# Patient Record
Sex: FEMALE | Race: WHITE | NOT HISPANIC OR LATINO | Employment: OTHER | URBAN - METROPOLITAN AREA
[De-identification: names, ages, dates, MRNs, and addresses within clinical notes are randomized per-mention and may not be internally consistent; named-entity substitution may affect disease eponyms.]

---

## 2017-01-10 ENCOUNTER — OFFICE VISIT (OUTPATIENT)
Dept: FAMILY MEDICINE | Facility: CLINIC | Age: 77
End: 2017-01-10
Payer: MEDICARE

## 2017-01-10 VITALS
HEIGHT: 60 IN | DIASTOLIC BLOOD PRESSURE: 64 MMHG | HEART RATE: 92 BPM | SYSTOLIC BLOOD PRESSURE: 104 MMHG | BODY MASS INDEX: 26.97 KG/M2 | RESPIRATION RATE: 16 BRPM | OXYGEN SATURATION: 96 % | WEIGHT: 137.38 LBS

## 2017-01-10 DIAGNOSIS — E78.5 HYPERLIPIDEMIA, UNSPECIFIED HYPERLIPIDEMIA TYPE: Primary | ICD-10-CM

## 2017-01-10 DIAGNOSIS — R25.2 CRAMPS OF LOWER EXTREMITY, UNSPECIFIED LATERALITY: ICD-10-CM

## 2017-01-10 DIAGNOSIS — Z12.39 BREAST CANCER SCREENING: ICD-10-CM

## 2017-01-10 DIAGNOSIS — Z12.31 ENCOUNTER FOR SCREENING MAMMOGRAM FOR MALIGNANT NEOPLASM OF BREAST: ICD-10-CM

## 2017-01-10 DIAGNOSIS — M81.0 OSTEOPOROSIS: ICD-10-CM

## 2017-01-10 DIAGNOSIS — Z12.11 COLON CANCER SCREENING: ICD-10-CM

## 2017-01-10 PROCEDURE — 99213 OFFICE O/P EST LOW 20 MIN: CPT | Mod: PBBFAC,PO | Performed by: FAMILY MEDICINE

## 2017-01-10 PROCEDURE — 99214 OFFICE O/P EST MOD 30 MIN: CPT | Mod: S$PBB,,, | Performed by: FAMILY MEDICINE

## 2017-01-10 PROCEDURE — 99999 PR PBB SHADOW E&M-EST. PATIENT-LVL III: CPT | Mod: PBBFAC,,, | Performed by: FAMILY MEDICINE

## 2017-01-10 RX ORDER — OMEPRAZOLE 40 MG/1
40 CAPSULE, DELAYED RELEASE ORAL DAILY
Qty: 30 CAPSULE | Refills: 11 | Status: SHIPPED | OUTPATIENT
Start: 2017-01-10 | End: 2017-08-25

## 2017-01-10 RX ORDER — IBANDRONATE SODIUM 150 MG/1
150 TABLET, FILM COATED ORAL
Qty: 1 TABLET | Refills: 11 | Status: SHIPPED | OUTPATIENT
Start: 2017-01-10 | End: 2018-02-05 | Stop reason: SDUPTHER

## 2017-01-10 NOTE — PROGRESS NOTES
Subjective:       Patient ID: Arianne Johnson is a 76 y.o. female.    Chief Complaint: Hyperlipidemia    HPI     C/o bilateral leg cramps x 1 year.  Comes about with prolonged sitting on a recliner.      Reports cramping along upper abdomen with rotation.  Intermittent.      Reports taking fosamax for 1 month in 2015 for osteoporosis. Prefers to take the med once a month.        Review of Systems      Review of Systems   Constitutional: Negative for fever and chills.   HENT: Negative for hearing loss and neck stiffness.    Eyes: Negative for redness and itching.   Respiratory: Negative for cough and choking.    Cardiovascular: Negative for chest pain and leg swelling.  Abdomen: Negative for blood in stool.   Genitourinary: Negative for dysuria and flank pain.   Musculoskeletal: Negative for back pain and gait problem.   Neurological: Negative for light-headedness and headaches.   Hematological: Negative for adenopathy.   Psychiatric/Behavioral: Negative for behavioral problems.     Objective:      Physical Exam   Constitutional: She appears well-developed.   HENT:   Head: Normocephalic and atraumatic.   Eyes: Conjunctivae are normal. Pupils are equal, round, and reactive to light.   Neck: Normal range of motion.   Cardiovascular: Normal rate and regular rhythm.    No murmur heard.  Pulmonary/Chest: Effort normal and breath sounds normal. She has no wheezes.   Abdominal: Soft. Bowel sounds are normal. She exhibits no distension. There is no tenderness.   Musculoskeletal: Normal range of motion.   Lymphadenopathy:     She has no cervical adenopathy.       Assessment:       1. Hyperlipidemia, unspecified hyperlipidemia type    2. Cramps of lower extremity, unspecified laterality    3. Breast cancer screening    4. Colon cancer screening    5. Encounter for screening mammogram for malignant neoplasm of breast     6. Osteoporosis        Plan:       Hyperlipidemia, unspecified hyperlipidemia type  -     Comprehensive metabolic  panel; Future; Expected date: 1/10/17  -     Lipid panel; Future; Expected date: 1/10/17  -     TSH; Future; Expected date: 1/10/17  -     Magnesium; Future; Expected date: 1/10/17    Cramps of lower extremity, unspecified laterality  -     Magnesium; Future; Expected date: 1/10/17    Breast cancer screening  -     Mammo Digital Screening Bilat with CAD; Future; Expected date: 1/10/17    Colon cancer screening  -     Case request GI: COLONOSCOPY    Encounter for screening mammogram for malignant neoplasm of breast   -     Mammo Digital Screening Bilat with CAD; Future; Expected date: 1/10/17    Osteoporosis    Other orders  -     ibandronate (BONIVA) 150 mg tablet; Take 1 tablet (150 mg total) by mouth every 30 days.  Dispense: 1 tablet; Refill: 11  -     omeprazole (PRILOSEC) 40 MG capsule; Take 1 capsule (40 mg total) by mouth once daily.  Dispense: 30 capsule; Refill: 11          Plan:  Start boniva  rf prilosec  See orders  Pt will think about vaccines i.e zoster, prevnar and pneumovax

## 2017-01-10 NOTE — MR AVS SNAPSHOT
[facility]  [address] [facility]  [facility] [address]  Phone: [phone]  Fax: [phone]                  Arianne Johnson   1/10/2017 1:20 PM   Office Visit    Description:  Female : 1940   Provider:  Chiki Paniagua MD   Department:  [facility]           Reason for Visit     Hyperlipidemia           Diagnoses this Visit        Comments    Hyperlipidemia, unspecified hyperlipidemia type    -  Primary     Cramps of lower extremity, unspecified laterality         Breast cancer screening         Colon cancer screening         Encounter for screening mammogram for malignant neoplasm of breast                To Do List           Future Appointments        Provider Department Dept Phone    2017 10:35 AM LAB, [place] [facility] [phone]    2017 3:30 PM [facility] MAMMO1 [facility] [phone]    7/10/2017 1:20 PM Chiki Paniagua MD [facility] [phone]      Goals (5 Years of Data)     None      Follow-Up and Disposition     Return in about 6 months (around 7/10/2017).       These Medications        Disp Refills Start End    ibandronate (BONIVA) 150 mg tablet 1 tablet 11 1/10/2017 2017    Take 1 tablet (150 mg total) by mouth every 30 days. - Oral    Pharmacy: [facility] [id number] [place], [place] [address] AT [facility] [address] Ph #: [phone]       omeprazole (PRILOSEC) 40 MG capsule 30 capsule 11 1/10/2017     Take 1 capsule (40 mg total) by mouth once daily. - Oral    Pharmacy: [facility] [id number] [place] [address] AVE AT [facility] Ph #: [phone]         [facility]     [facility] Nurse Care Line -  Assistance  Registered nurses in the [facility] provide clinical advisement, health education, appointment booking, and other advisory services.  Call for this free service at  [phone].             Medications           Message regarding Medications     Verify the changes and/or additions to your medication regime listed below are the same as discussed with your clinician today.  If any of these changes or additions are incorrect, please notify your healthcare provider.        START taking these NEW medications        Refills    ibandronate (BONIVA) 150 mg tablet 11    Sig: Take 1 tablet (150 mg total) by mouth every 30 days.    Class: Normal    Route: Oral    omeprazole (PRILOSEC) 40 MG capsule 11    Sig: Take 1 capsule (40 mg total) by mouth once daily.    Class: Normal    Route: Oral      STOP taking these medications     alendronate (FOSAMAX) 70 MG tablet Take 1 tablet (70 mg total) by mouth every 7 days.           Verify that the below list of medications is an accurate representation of the medications you are currently taking.  If none reported, the list may be blank. If incorrect, please contact your healthcare provider. Carry this list with you in case of emergency.           Current Medications     calcium-vitamin D 600 mg, 1,500mg,-200 unit (CALCIUM 600 WITH VITAMIN D3) 600 mg(1,500mg) -200 unit Tab Take 1 tablet by mouth once daily.    cetirizine (ZYRTEC) 10 MG tablet Take 10 mg by mouth once daily.    fluticasone (FLONASE) 50 mcg/actuation nasal spray 2 sprays by Each Nare route once daily.    naproxen (EC NAPROSYN) 500 MG EC tablet     ibandronate (BONIVA) 150 mg tablet Take 1 tablet (150 mg total) by mouth every 30 days.    multivitamin capsule Take 1 capsule by mouth once daily.      omeprazole (PRILOSEC) 40 MG capsule Take 1 capsule (40 mg total) by mouth once daily.           Clinical Reference Information           Vital Signs - Last Recorded  Most recent update: 1/10/2017 12:52 PM by Daisy Cruz LPN    BP Pulse Resp Ht Wt SpO2    104/64 92 16 5' (1.524 m) 62.3 kg (137 lb 5.6 oz) 96%    BMI                26.82 kg/m2          Blood Pressure          Most  Recent Value    BP  104/64      Allergies as of 1/10/2017     Codeine    Demerol [Meperidine]    Sulfa (Sulfonamide Antibiotics)      Immunizations Administered on Date of Encounter - 1/10/2017     None      Orders Placed During Today's Visit      Normal Orders This Visit    Case request GI: COLONOSCOPY     Future Labs/Procedures Expected by Expires    Comprehensive metabolic panel  1/10/2017 4/10/2017    Lipid panel  1/10/2017 4/10/2017    Magnesium  1/10/2017 4/10/2017    Mammo Digital Screening Bilat with CAD  1/10/2017 4/10/2017    TSH  1/10/2017 4/10/2017      [facility] Sign-Up     Activating your [facility] account is as easy as 1-2-3!     1) Visit [email or web address], select Sign Up Now, enter this activation code and your date of birth, then select Next.  [email or web address]  Expires: 2/24/2017  2:01 PM      2) Create a username and password to use when you visit [facility] in the future and select a security question in case you lose your password and select Next.    3) Enter your e-mail address and click Sign Up!    Additional Information  If you have questions, please e-mail [email or web address] or call [phone] to talk to our [facility] staff. Remember, [facility] is NOT to be used for urgent needs. For medical emergencies, dial 911.

## 2017-01-18 ENCOUNTER — HOSPITAL ENCOUNTER (OUTPATIENT)
Dept: RADIOLOGY | Facility: HOSPITAL | Age: 77
Discharge: HOME | End: 2017-01-18
Attending: FAMILY MEDICINE
Payer: MEDICARE

## 2017-01-18 DIAGNOSIS — Z12.39 BREAST CANCER SCREENING: ICD-10-CM

## 2017-01-18 DIAGNOSIS — Z12.31 ENCOUNTER FOR SCREENING MAMMOGRAM FOR MALIGNANT NEOPLASM OF BREAST: ICD-10-CM

## 2017-01-18 PROCEDURE — 77067 SCR MAMMO BI INCL CAD: CPT | Mod: 26,,, | Performed by: RADIOLOGY

## 2017-01-18 PROCEDURE — 77063 BREAST TOMOSYNTHESIS BI: CPT | Mod: 26,,, | Performed by: RADIOLOGY

## 2017-01-18 PROCEDURE — 77067 SCR MAMMO BI INCL CAD: CPT | Mod: TC

## 2017-01-24 ENCOUNTER — TELEPHONE (OUTPATIENT)
Dept: FAMILY MEDICINE | Facility: CLINIC | Age: 77
End: 2017-01-24

## 2017-01-24 DIAGNOSIS — E78.5 HYPERLIPIDEMIA, UNSPECIFIED HYPERLIPIDEMIA TYPE: Primary | ICD-10-CM

## 2017-01-24 RX ORDER — ATORVASTATIN CALCIUM 40 MG/1
40 TABLET, FILM COATED ORAL DAILY
Qty: 90 TABLET | Refills: 3 | Status: SHIPPED | OUTPATIENT
Start: 2017-01-24 | End: 2017-11-05 | Stop reason: SDUPTHER

## 2017-01-24 NOTE — TELEPHONE ENCOUNTER
inform pt via phone that I reviewed the test results and note the following:    Pertinent results:    Elevated cholesterol panel.     I esent in lipitor. Please come back in 3 months for repeat cholesterol panel.  Link 3 month cmp and lipids.

## 2017-02-21 ENCOUNTER — ANESTHESIA (OUTPATIENT)
Dept: ENDOSCOPY | Facility: HOSPITAL | Age: 77
End: 2017-02-21
Payer: MEDICARE

## 2017-02-21 ENCOUNTER — ANESTHESIA EVENT (OUTPATIENT)
Dept: ENDOSCOPY | Facility: HOSPITAL | Age: 77
End: 2017-02-21
Payer: MEDICARE

## 2017-02-21 ENCOUNTER — HOSPITAL ENCOUNTER (OUTPATIENT)
Facility: HOSPITAL | Age: 77
Discharge: HOME | End: 2017-02-21
Attending: INTERNAL MEDICINE | Admitting: INTERNAL MEDICINE
Payer: MEDICARE

## 2017-02-21 ENCOUNTER — SURGERY (OUTPATIENT)
Age: 77
End: 2017-02-21

## 2017-02-21 VITALS
HEART RATE: 80 BPM | TEMPERATURE: 98 F | DIASTOLIC BLOOD PRESSURE: 70 MMHG | HEIGHT: 60 IN | SYSTOLIC BLOOD PRESSURE: 126 MMHG | OXYGEN SATURATION: 96 % | RESPIRATION RATE: 16 BRPM | BODY MASS INDEX: 27.48 KG/M2 | WEIGHT: 140 LBS

## 2017-02-21 VITALS — RESPIRATION RATE: 11 BRPM

## 2017-02-21 DIAGNOSIS — Z12.11 SCREENING FOR COLON CANCER: ICD-10-CM

## 2017-02-21 PROCEDURE — 25000003 PHARM REV CODE 250: Mod: PO | Performed by: INTERNAL MEDICINE

## 2017-02-21 PROCEDURE — G0121 COLON CA SCRN NOT HI RSK IND: HCPCS | Mod: PO | Performed by: INTERNAL MEDICINE

## 2017-02-21 PROCEDURE — G0121 COLON CA SCRN NOT HI RSK IND: HCPCS | Mod: ,,, | Performed by: INTERNAL MEDICINE

## 2017-02-21 PROCEDURE — 37000008 HC ANESTHESIA 1ST 15 MINUTES: Mod: PO | Performed by: INTERNAL MEDICINE

## 2017-02-21 PROCEDURE — 25000003 PHARM REV CODE 250: Mod: PO | Performed by: NURSE ANESTHETIST, CERTIFIED REGISTERED

## 2017-02-21 PROCEDURE — 63600175 PHARM REV CODE 636 W HCPCS: Mod: PO | Performed by: NURSE ANESTHETIST, CERTIFIED REGISTERED

## 2017-02-21 PROCEDURE — 37000009 HC ANESTHESIA EA ADD 15 MINS: Mod: PO | Performed by: INTERNAL MEDICINE

## 2017-02-21 PROCEDURE — D9220A PRA ANESTHESIA: Mod: 33,CRNA,, | Performed by: NURSE ANESTHETIST, CERTIFIED REGISTERED

## 2017-02-21 PROCEDURE — D9220A PRA ANESTHESIA: Mod: 33,ANES,, | Performed by: ANESTHESIOLOGY

## 2017-02-21 RX ORDER — PROPOFOL 10 MG/ML
VIAL (ML) INTRAVENOUS
Status: DISCONTINUED | OUTPATIENT
Start: 2017-02-21 | End: 2017-02-21

## 2017-02-21 RX ORDER — SODIUM CHLORIDE, SODIUM LACTATE, POTASSIUM CHLORIDE, CALCIUM CHLORIDE 600; 310; 30; 20 MG/100ML; MG/100ML; MG/100ML; MG/100ML
INJECTION, SOLUTION INTRAVENOUS CONTINUOUS
Status: DISCONTINUED | OUTPATIENT
Start: 2017-02-21 | End: 2017-02-21 | Stop reason: HOSPADM

## 2017-02-21 RX ORDER — LIDOCAINE HCL/PF 100 MG/5ML
SYRINGE (ML) INTRAVENOUS
Status: DISCONTINUED | OUTPATIENT
Start: 2017-02-21 | End: 2017-02-21

## 2017-02-21 RX ADMIN — PROPOFOL 30 MG: 10 INJECTION, EMULSION INTRAVENOUS at 07:02

## 2017-02-21 RX ADMIN — LIDOCAINE HYDROCHLORIDE 100 MG: 20 INJECTION, SOLUTION INTRAVENOUS at 07:02

## 2017-02-21 RX ADMIN — SODIUM CHLORIDE, SODIUM LACTATE, POTASSIUM CHLORIDE, AND CALCIUM CHLORIDE: .6; .31; .03; .02 INJECTION, SOLUTION INTRAVENOUS at 07:02

## 2017-02-21 NOTE — DISCHARGE SUMMARY
Discharge Note  Short Stay      SUMMARY     Admit Date: 2/21/2017    Attending Physician: Juni Devi MD     Discharge Physician: Juni Devi MD    Discharge Date: 2/21/2017 7:40 AM    Final Diagnosis: Colon cancer screening [Z12.11]    Disposition: HOME OR SELF CARE    Patient Instructions:   Current Discharge Medication List      CONTINUE these medications which have NOT CHANGED    Details   atorvastatin (LIPITOR) 40 MG tablet Take 1 tablet (40 mg total) by mouth once daily.  Qty: 90 tablet, Refills: 3      calcium-vitamin D 600 mg, 1,500mg,-200 unit (CALCIUM 600 WITH VITAMIN D3) 600 mg(1,500mg) -200 unit Tab Take 1 tablet by mouth once daily.      cetirizine (ZYRTEC) 10 MG tablet Take 10 mg by mouth once daily.      fluticasone (FLONASE) 50 mcg/actuation nasal spray 2 sprays by Each Nare route once daily.  Qty: 16 g, Refills: 11      ibandronate (BONIVA) 150 mg tablet Take 1 tablet (150 mg total) by mouth every 30 days.  Qty: 1 tablet, Refills: 11      multivitamin capsule Take 1 capsule by mouth once daily.        omeprazole (PRILOSEC) 40 MG capsule Take 1 capsule (40 mg total) by mouth once daily.  Qty: 30 capsule, Refills: 11      naproxen (EC NAPROSYN) 500 MG EC tablet              Discharge Procedure Orders (must include Diet, Follow-up, Activity)    Follow Up:  Follow up with PCP as previously scheduled  Resume routine diet.  Activity as tolerated.    No driving day of procedure.

## 2017-02-21 NOTE — H&P
History & Physical - Short Stay  Gastroenterology      SUBJECTIVE:     Procedure: Colonoscopy    Chief Complaint/Indication for Procedure: Screening    PTA Medications   Medication Sig    atorvastatin (LIPITOR) 40 MG tablet Take 1 tablet (40 mg total) by mouth once daily.    calcium-vitamin D 600 mg, 1,500mg,-200 unit (CALCIUM 600 WITH VITAMIN D3) 600 mg(1,500mg) -200 unit Tab Take 1 tablet by mouth once daily.    cetirizine (ZYRTEC) 10 MG tablet Take 10 mg by mouth once daily.    fluticasone (FLONASE) 50 mcg/actuation nasal spray 2 sprays by Each Nare route once daily.    ibandronate (BONIVA) 150 mg tablet Take 1 tablet (150 mg total) by mouth every 30 days.    multivitamin capsule Take 1 capsule by mouth once daily.      omeprazole (PRILOSEC) 40 MG capsule Take 1 capsule (40 mg total) by mouth once daily.    naproxen (EC NAPROSYN) 500 MG EC tablet        Review of patient's allergies indicates:   Allergen Reactions    Codeine Nausea Only    Demerol [meperidine]      nausea    Sulfa (sulfonamide antibiotics) Nausea Only        Past Medical History   Diagnosis Date    Abnormal Pap smear      repeats were normal    Breast disorder      benign left breast biopsy    GERD (gastroesophageal reflux disease)     Hay fever     Hyperlipidemia     Seizures 02/1960     after giving birth, none since     Past Surgical History   Procedure Laterality Date    Appendectomy      Dilation and curettage of uterus      Total abdominal hysterectomy      Tonsillectomy, adenoidectomy       Family History   Problem Relation Age of Onset    Breast cancer Neg Hx     Ovarian cancer Neg Hx      Social History   Substance Use Topics    Smoking status: Never Smoker    Smokeless tobacco: Never Used    Alcohol use Yes      Comment: occasionally         OBJECTIVE:     Vital Signs (Most Recent)  Temp: 97.9 °F (36.6 °C) (02/21/17 0658)  Pulse: 77 (02/21/17 0658)  Resp: 16 (02/21/17 0658)  BP: (!) 145/67 (02/21/17  0658)  SpO2: 98 % (02/21/17 0658)    Physical Exam                                                        GENERAL:  Comfortable, in no acute distress.                                 HEENT EXAM:  Nonicteric.  No adenopathy.  Oropharynx is clear.               NECK:  Supple.                                                               LUNGS:  Clear.                                                               CARDIAC:  Regular rate and rhythm.  S1, S2.  No murmur.                      ABDOMEN:  Soft, positive bowel sounds, nontender.  No hepatosplenomegaly or masses.  No rebound or guarding.                                             EXTREMITIES:  No edema.     MENTAL STATUS:  Normal, alert and oriented.      ASSESSMENT/PLAN:     Assessment: Colorectal cancer screening    Plan: Colonoscopy    Anesthesia Plan: General    ASA Grade: ASA 2 - Patient with mild systemic disease with no functional limitations    MALLAMPATI SCORE:  I (soft palate, uvula, fauces, and tonsillar pillars visible)

## 2017-02-21 NOTE — ANESTHESIA POSTPROCEDURE EVALUATION
Anesthesia Post Evaluation    Patient: Arianne Johnson    Procedure(s) Performed: Procedure(s) (LRB):  COLONOSCOPY (N/A)    Final Anesthesia Type: general  Patient location during evaluation: PACU  Patient participation: Yes- Able to Participate  Level of consciousness: awake and alert  Post-procedure vital signs: reviewed and stable  Pain management: adequate  Airway patency: patent  PONV status at discharge: No PONV  Anesthetic complications: no      Cardiovascular status: hemodynamically stable and blood pressure returned to baseline  Respiratory status: unassisted, spontaneous ventilation and room air  Hydration status: euvolemic  Follow-up not needed.        Visit Vitals    /70    Pulse 80    Temp 36.8 °C (98.2 °F)    Resp 16    Ht 5' (1.524 m)    Wt 63.5 kg (140 lb)    SpO2 96%    Breastfeeding No    BMI 27.34 kg/m2       Pain/Henny Score: Pain Assessment Performed: Yes (2/21/2017  8:10 AM)  Presence of Pain: denies (2/21/2017  8:10 AM)  Henny Score: 10 (2/21/2017  8:10 AM)

## 2017-02-21 NOTE — ANESTHESIA PREPROCEDURE EVALUATION
02/21/2017  Arianne Johnson is a 76 y.o., female.    [facility] Anesthesia Evaluation      I have reviewed the Medications.     Review of Systems  Anesthesia Hx:  No problems with previous Anesthesia   Social:  Non-Smoker, No Alcohol Use    Cardiovascular:   hyperlipidemia    Pulmonary:  Pulmonary Normal    Renal/:  Renal/ Normal     Hepatic/GI:   GERD    Endocrine:  Endocrine Normal        Physical Exam  General:  Well nourished    Airway/Jaw/Neck:  Airway Findings: Mouth Opening: Normal General Airway Assessment: Adult  Mallampati: II  Jaw/Neck Findings:  Neck ROM: Extension Decreased, Mild       Chest/Lungs:  Chest/Lungs Findings: Clear to auscultation, Normal Respiratory Rate     Heart/Vascular:  Heart Findings: Rate: Normal  Rhythm: Regular Rhythm  Sounds: Normal  Heart murmur: negative Vascular Findings: Normal (No carotid bruits.)       Mental Status:  Mental Status Findings:  Cooperative, Alert and Oriented         Anesthesia Plan  Type of Anesthesia, risks & benefits discussed:  Anesthesia Type:  general  Patient's Preference:   Intra-op Monitoring Plan:   Intra-op Monitoring Plan Comments:   Post Op Pain Control Plan:   Post Op Pain Control Plan Comments:   Induction:   IV  Beta Blocker:  Patient is not currently on a Beta-Blocker (No further documentation required).       Informed Consent: Patient understands risks and agrees with Anesthesia plan.  Questions answered. Anesthesia consent signed with patient.  ASA Score: 2     Day of Surgery Review of History & Physical:        Anesthesia Plan Notes: Propofol general.        Ready For Surgery From Anesthesia Perspective.

## 2017-02-21 NOTE — IP AVS SNAPSHOT
[facility]  [address]  [place] [address]  Phone: [phone]           Patient Discharge Instructions     Our goal is to set you up for success. This packet includes information on your condition, medications, and your home care. It will help you to care for yourself so you don't get sicker and need to go back to the hospital.     Please ask your nurse if you have any questions.        There are many details to remember when preparing to leave the hospital. Here is what you will need to do:    1. Take your medicine. If you are prescribed medications, review your Medication List in the following pages. You may have new medications to  at the pharmacy and others that you'll need to stop taking. Review the instructions for how and when to take your medications. Talk with your doctor or nurses if you are unsure of what to do.     2. Go to your follow-up appointments. Specific follow-up information is listed in the following pages. Your may be contacted by a transition nurse or clinical provider about future appointments. Be sure we have all of the phone numbers to reach you, if needed. Please contact your provider's office if you are unable to make an appointment.     3. Watch for warning signs. Your doctor or nurse will give you detailed warning signs to watch for and when to call for assistance. These instructions may also include educational information about your condition. If you experience any of warning signs to your health, call your doctor.               [facility]  Unless otherwise directed by your provider, please contact [facility], our nurse care line that is available for 24/7 assistance.     [phone] (toll-free)    Registered nurses in the [facility] provide clinical advisement, health education, appointment booking, and other advisory services.                    ** Verify the list of medication(s) below is accurate and up  to date. Carry this with you in case of emergency. If your medications have changed, please notify your healthcare provider.             Medication List      CONTINUE taking these medications        Additional Info                      atorvastatin 40 MG tablet   Commonly known as:  LIPITOR   Quantity:  90 tablet   Refills:  3   Dose:  40 mg    Instructions:  Take 1 tablet (40 mg total) by mouth once daily.     Begin Date    AM    Noon    PM    Bedtime       CALCIUM 600 WITH VITAMIN D3 600 mg(1,500mg) -200 unit Tab   Refills:  0   Dose:  1 tablet   Generic drug:  calcium-vitamin D3    Instructions:  Take 1 tablet by mouth once daily.     Begin Date    AM    Noon    PM    Bedtime       cetirizine 10 MG tablet   Commonly known as:  ZYRTEC   Refills:  0   Dose:  10 mg    Instructions:  Take 10 mg by mouth once daily.     Begin Date    AM    Noon    PM    Bedtime       fluticasone 50 mcg/actuation nasal spray   Commonly known as:  FLONASE   Quantity:  16 g   Refills:  11   Dose:  2 spray    Instructions:  2 sprays by Each Nare route once daily.     Begin Date    AM    Noon    PM    Bedtime       ibandronate 150 mg tablet   Commonly known as:  BONIVA   Quantity:  1 tablet   Refills:  11   Dose:  150 mg    Instructions:  Take 1 tablet (150 mg total) by mouth every 30 days.     Begin Date    AM    Noon    PM    Bedtime       multivitamin capsule   Refills:  0   Dose:  1 capsule    Instructions:  Take 1 capsule by mouth once daily.     Begin Date    AM    Noon    PM    Bedtime       omeprazole 40 MG capsule   Commonly known as:  PRILOSEC   Quantity:  30 capsule   Refills:  11   Dose:  40 mg    Instructions:  Take 1 capsule (40 mg total) by mouth once daily.     Begin Date    AM    Noon    PM    Bedtime         ASK your doctor about these medications        Additional Info                      naproxen 500 MG EC tablet   Commonly known as:  EC NAPROSYN   Refills:  0      Begin Date    AM    Noon    PM    Bedtime                   Please bring to all follow up appointments:    1. A copy of your discharge instructions.  2. All medicines you are currently taking in their original bottles.  3. Identification and insurance card.    Please arrive 15 minutes ahead of scheduled appointment time.    Please call 24 hours in advance if you must reschedule your appointment and/or time.        Your Scheduled Appointments     Jul 10, 2017  1:20 PM CDT   Established Patient Visit with Chiki Paniagua MD   [facility]    [address] [facility] [address] [place] [address]   [phone]              Follow-up Information     Follow up with Chiki Paniagua MD.    Specialty:  Family Medicine    Contact information:    [address] [facility] [address]  [phone]            Discharge Instructions         Procedural Sedation (Adult)  You have been given medicine by vein to make you sleep during your surgery. This may have included both a pain medicine and sleeping medicine. Most of the effects have worn off. But you may still have some drowsiness for the next 6 to 8 hours.  Home care  Follow these guidelines when you get home:  · For the next 8 hours, you should be watched by a responsible adult. This person should make sure your condition is not getting worse.  · Don't take any medicine by mouth for pain or for sleep during the next 4 hours. These might react with the medicines you were given in the hospital. This could cause a much stronger response than usual.  · Don't drink any alcohol for the next 24 hours.  · Don't drive, operate dangerous machinery, or make important business or personal decisions during the next 24 hours.  Follow-up care  Follow up with your healthcare provider if you are not alert and back to your usual level of activity within 12 hours.  When to seek medical advice  Call your healthcare provider right away if any of these occur:  · Drowsiness gets worse  · Weakness or dizziness gets  worse  · Repeated vomiting  · You cannot be awakened   Date Last Reviewed: 10/18/2016  © 8833-3974 [employer]. [address] [place] [address], [place] [address]. All rights reserved. This information is not intended as a substitute for professional medical care. Always follow your healthcare professional's instructions.      See colon sheet      Admission Information     Date & Time Provider Department CSN    2/21/2017  6:20 AM Juni Devi MD [facility] [id number]      Care Providers     Provider Role Specialty Primary office phone    Juni Devi MD Attending Provider Gastroenterology [phone]    Juni Devi MD Surgeon  Gastroenterology [phone]      Your Vitals Were     BP Pulse Temp Resp Height Weight    97/50 (BP Location: Left arm, Patient Position: Lying, BP Method: Automatic) 80 98.2 °F (36.8 °C) 16 5' (1.524 m) 63.5 kg (140 lb)    SpO2 BMI             96% 27.34 kg/m2         Recent Lab Values     No lab values to display.      Allergies as of 2/21/2017        Reactions    Codeine Nausea Only    Demerol [Meperidine]     nausea    Sulfa (Sulfonamide Antibiotics) Nausea Only      Advance Directives     An advance directive is a document which, in the event you are no longer able to make decisions for yourself, tells your healthcare team what kind of treatment you do or do not want to receive, or who you would like to make those decisions for you.  If you do not currently have an advance directive, [facility] encourages you to create one.  For more information call:  [phone]), [phone] ([phone]),  or log on to [email or web address].        Language Assistance Services     ATTENTION: Language assistance services are available, free of charge. Please call [phone].      ATENCIÓN: Si habla español, tiene a garza disposición servicios gratuitos de asistencia lingüística. Llame al [phone].     CHÚ Ý: N?u b?n nói Ti?ng Vi?t,  có các d?ch v? h? tr? ngôn ng? mi?n phí dành cho b?n. G?i s? [phone].         [facility] complies with applicable Federal civil rights laws and does not discriminate on the basis of race, color, national origin, age, disability, or sex.

## 2017-02-21 NOTE — DISCHARGE INSTRUCTIONS
Procedural Sedation (Adult)  You have been given medicine by vein to make you sleep during your surgery. This may have included both a pain medicine and sleeping medicine. Most of the effects have worn off. But you may still have some drowsiness for the next 6 to 8 hours.  Home care  Follow these guidelines when you get home:  · For the next 8 hours, you should be watched by a responsible adult. This person should make sure your condition is not getting worse.  · Don't take any medicine by mouth for pain or for sleep during the next 4 hours. These might react with the medicines you were given in the hospital. This could cause a much stronger response than usual.  · Don't drink any alcohol for the next 24 hours.  · Don't drive, operate dangerous machinery, or make important business or personal decisions during the next 24 hours.  Follow-up care  Follow up with your healthcare provider if you are not alert and back to your usual level of activity within 12 hours.  When to seek medical advice  Call your healthcare provider right away if any of these occur:  · Drowsiness gets worse  · Weakness or dizziness gets worse  · Repeated vomiting  · You cannot be awakened   Date Last Reviewed: 10/18/2016  © 0596-6590 [employer] [address] [place] [address], [place] [address]. All rights reserved. This information is not intended as a substitute for professional medical care. Always follow your healthcare professional's instructions.      See colon sheet

## 2017-07-19 ENCOUNTER — LAB VISIT (OUTPATIENT)
Dept: LAB | Facility: HOSPITAL | Age: 77
End: 2017-07-19
Attending: FAMILY MEDICINE
Payer: MEDICARE

## 2017-07-19 DIAGNOSIS — E78.5 HYPERLIPIDEMIA, UNSPECIFIED HYPERLIPIDEMIA TYPE: ICD-10-CM

## 2017-07-19 LAB
ALBUMIN SERPL BCP-MCNC: 3.7 G/DL
ALP SERPL-CCNC: 108 U/L
ALT SERPL W/O P-5'-P-CCNC: 16 U/L
ANION GAP SERPL CALC-SCNC: 6 MMOL/L
AST SERPL-CCNC: 18 U/L
BILIRUB SERPL-MCNC: 0.4 MG/DL
BUN SERPL-MCNC: 14 MG/DL
CALCIUM SERPL-MCNC: 9.7 MG/DL
CHLORIDE SERPL-SCNC: 106 MMOL/L
CHOLEST/HDLC SERPL: 3.3 {RATIO}
CO2 SERPL-SCNC: 29 MMOL/L
CREAT SERPL-MCNC: 0.9 MG/DL
EST. GFR  (AFRICAN AMERICAN): >60 ML/MIN/1.73 M^2
EST. GFR  (NON AFRICAN AMERICAN): >60 ML/MIN/1.73 M^2
GLUCOSE SERPL-MCNC: 103 MG/DL
HDL/CHOLESTEROL RATIO: 30.5 %
HDLC SERPL-MCNC: 151 MG/DL
HDLC SERPL-MCNC: 46 MG/DL
LDLC SERPL CALC-MCNC: 80.8 MG/DL
NONHDLC SERPL-MCNC: 105 MG/DL
POTASSIUM SERPL-SCNC: 5 MMOL/L
PROT SERPL-MCNC: 7.3 G/DL
SODIUM SERPL-SCNC: 141 MMOL/L
TRIGL SERPL-MCNC: 121 MG/DL

## 2017-07-19 PROCEDURE — 80061 LIPID PANEL: CPT

## 2017-07-19 PROCEDURE — 80053 COMPREHEN METABOLIC PANEL: CPT

## 2017-07-19 PROCEDURE — 36415 COLL VENOUS BLD VENIPUNCTURE: CPT | Mod: PO

## 2017-07-30 ENCOUNTER — PATIENT MESSAGE (OUTPATIENT)
Dept: FAMILY MEDICINE | Facility: CLINIC | Age: 77
End: 2017-07-30

## 2017-08-25 ENCOUNTER — OFFICE VISIT (OUTPATIENT)
Dept: FAMILY MEDICINE | Facility: CLINIC | Age: 77
End: 2017-08-25
Payer: MEDICARE

## 2017-08-25 VITALS
DIASTOLIC BLOOD PRESSURE: 77 MMHG | SYSTOLIC BLOOD PRESSURE: 114 MMHG | WEIGHT: 138.69 LBS | BODY MASS INDEX: 27.23 KG/M2 | HEIGHT: 60 IN | OXYGEN SATURATION: 95 % | HEART RATE: 71 BPM

## 2017-08-25 DIAGNOSIS — R55 SYNCOPE AND COLLAPSE: Primary | ICD-10-CM

## 2017-08-25 DIAGNOSIS — R42 DIZZINESS: ICD-10-CM

## 2017-08-25 DIAGNOSIS — K29.70 GASTRITIS, PRESENCE OF BLEEDING UNSPECIFIED, UNSPECIFIED CHRONICITY, UNSPECIFIED GASTRITIS TYPE: ICD-10-CM

## 2017-08-25 PROCEDURE — 99214 OFFICE O/P EST MOD 30 MIN: CPT | Mod: S$PBB,,, | Performed by: FAMILY MEDICINE

## 2017-08-25 PROCEDURE — 1159F MED LIST DOCD IN RCRD: CPT | Mod: ,,, | Performed by: FAMILY MEDICINE

## 2017-08-25 PROCEDURE — 93005 ELECTROCARDIOGRAM TRACING: CPT | Mod: PBBFAC,PO | Performed by: FAMILY MEDICINE

## 2017-08-25 PROCEDURE — 99213 OFFICE O/P EST LOW 20 MIN: CPT | Mod: PBBFAC,PO | Performed by: FAMILY MEDICINE

## 2017-08-25 PROCEDURE — 93010 ELECTROCARDIOGRAM REPORT: CPT | Mod: ,,, | Performed by: INTERNAL MEDICINE

## 2017-08-25 PROCEDURE — 99999 PR PBB SHADOW E&M-EST. PATIENT-LVL III: CPT | Mod: PBBFAC,,, | Performed by: FAMILY MEDICINE

## 2017-08-25 PROCEDURE — 1126F AMNT PAIN NOTED NONE PRSNT: CPT | Mod: ,,, | Performed by: FAMILY MEDICINE

## 2017-08-25 NOTE — PROGRESS NOTES
Subjective:       Patient ID: Arianne Johnson is a 77 y.o. female.    Chief Complaint: Abdominal Pain (ED F/U [facility] - Dizzy Spells )    HPI     Recall that pt went to [facility]/2017 for epigastric abdominal pain.  Found to have gastritis. Reports less severe abdominal pain    Also, pt reports 3-4 episodes of dizzy spells lasting for 2-4 hours. Had 1 episode which caused to her have presyncopal attack.  No palpitations with dizzy spells. No worsening with movement.       Review of Systems      Review of Systems   Constitutional: Negative for fever and chills.   HENT: Negative for hearing loss and neck stiffness.    Eyes: Negative for redness and itching.   Respiratory: Negative for cough and choking.    Cardiovascular: Negative for chest pain and leg swelling.  Abdomen: Negative for  blood in stool.   Genitourinary: Negative for dysuria and flank pain.   Musculoskeletal: Negative for back pain and gait problem.   Neurological: Negative for  headaches.   Hematological: Negative for adenopathy.   Psychiatric/Behavioral: Negative for behavioral problems.     Objective:      Physical Exam   Constitutional: She is oriented to person, place, and time. She appears well-developed.   HENT:   Head: Normocephalic and atraumatic.   Eyes: Conjunctivae are normal. Pupils are equal, round, and reactive to light.   Neck: Normal range of motion.   Cardiovascular: Normal rate and regular rhythm.    No murmur heard.  Pulmonary/Chest: Effort normal and breath sounds normal. She has no wheezes.   Abdominal: Soft. Bowel sounds are normal. There is no tenderness.   Lymphadenopathy:     She has no cervical adenopathy.   Neurological: She is alert and oriented to person, place, and time. She has normal reflexes. No cranial nerve deficit.       Assessment:       1. Syncope and collapse     2. Dizziness    3. Gastritis, presence of bleeding unspecified, unspecified chronicity, unspecified gastritis type        Plan:       Syncope and  collapse   -     US Carotid Bilateral; Future; Expected date: 08/25/2017  -     NM Myocardial Perfusion Spect Multi Pharmacologic; Future; Expected date: 08/25/2017  -     Holter monitor - 48 hour; Future  -     IN OFFICE EKG 12-LEAD (to Muse)    Dizziness  -     US Carotid Bilateral; Future; Expected date: 08/25/2017  -     NM Myocardial Perfusion Spect Multi Pharmacologic; Future; Expected date: 08/25/2017  -     NM Multi Pharm Stress Cardiac Component; Future  -     Holter monitor - 48 hour; Future  -     IN OFFICE EKG 12-LEAD (to Muse)    Gastritis, presence of bleeding unspecified, unspecified chronicity, unspecified gastritis type      Plan:  See orders  Gastritis resolving

## 2017-08-28 ENCOUNTER — CLINICAL SUPPORT (OUTPATIENT)
Dept: CARDIOLOGY | Facility: CLINIC | Age: 77
End: 2017-08-28
Payer: MEDICARE

## 2017-08-28 DIAGNOSIS — R42 DIZZINESS: ICD-10-CM

## 2017-08-28 DIAGNOSIS — R55 SYNCOPE AND COLLAPSE: ICD-10-CM

## 2017-08-28 PROCEDURE — 93226 XTRNL ECG REC<48 HR SCAN A/R: CPT | Mod: PBBFAC,PO | Performed by: INTERNAL MEDICINE

## 2017-08-28 PROCEDURE — 93227 XTRNL ECG REC<48 HR R&I: CPT | Mod: S$PBB,,, | Performed by: INTERNAL MEDICINE

## 2017-08-30 ENCOUNTER — HOSPITAL ENCOUNTER (OUTPATIENT)
Dept: RADIOLOGY | Facility: HOSPITAL | Age: 77
Discharge: HOME | End: 2017-08-30
Attending: FAMILY MEDICINE
Payer: MEDICARE

## 2017-08-30 DIAGNOSIS — R55 SYNCOPE AND COLLAPSE: ICD-10-CM

## 2017-08-30 DIAGNOSIS — R42 DIZZINESS: ICD-10-CM

## 2017-08-30 PROCEDURE — 93880 EXTRACRANIAL BILAT STUDY: CPT | Mod: 26,,, | Performed by: RADIOLOGY

## 2017-08-30 PROCEDURE — 93880 EXTRACRANIAL BILAT STUDY: CPT | Mod: TC,PO

## 2017-09-04 ENCOUNTER — TELEPHONE (OUTPATIENT)
Dept: FAMILY MEDICINE | Facility: CLINIC | Age: 77
End: 2017-09-04

## 2017-09-04 NOTE — TELEPHONE ENCOUNTER
inform pt via phone that I reviewed the test results and note the following:    Carotid us and holter monitor were acceptable.

## 2017-09-05 ENCOUNTER — PATIENT MESSAGE (OUTPATIENT)
Dept: FAMILY MEDICINE | Facility: CLINIC | Age: 77
End: 2017-09-05

## 2017-09-05 RX ORDER — MECLIZINE HYDROCHLORIDE 25 MG/1
25 TABLET ORAL 3 TIMES DAILY PRN
Qty: 30 TABLET | Refills: 2 | Status: SHIPPED | OUTPATIENT
Start: 2017-09-05 | End: 2018-05-07 | Stop reason: SDUPTHER

## 2017-10-06 ENCOUNTER — OFFICE VISIT (OUTPATIENT)
Dept: FAMILY MEDICINE | Facility: CLINIC | Age: 77
End: 2017-10-06
Payer: MEDICARE

## 2017-10-06 VITALS
OXYGEN SATURATION: 97 % | TEMPERATURE: 98 F | WEIGHT: 142.19 LBS | RESPIRATION RATE: 18 BRPM | HEART RATE: 73 BPM | SYSTOLIC BLOOD PRESSURE: 120 MMHG | DIASTOLIC BLOOD PRESSURE: 60 MMHG | HEIGHT: 60 IN | BODY MASS INDEX: 27.92 KG/M2

## 2017-10-06 DIAGNOSIS — E78.5 HYPERLIPIDEMIA, UNSPECIFIED HYPERLIPIDEMIA TYPE: ICD-10-CM

## 2017-10-06 DIAGNOSIS — M81.0 OSTEOPOROSIS, UNSPECIFIED OSTEOPOROSIS TYPE, UNSPECIFIED PATHOLOGICAL FRACTURE PRESENCE: ICD-10-CM

## 2017-10-06 DIAGNOSIS — R42 VERTIGO: Primary | ICD-10-CM

## 2017-10-06 DIAGNOSIS — J31.0 CHRONIC RHINITIS, UNSPECIFIED TYPE: ICD-10-CM

## 2017-10-06 PROCEDURE — 99213 OFFICE O/P EST LOW 20 MIN: CPT | Mod: PBBFAC,PO | Performed by: FAMILY MEDICINE

## 2017-10-06 PROCEDURE — 99214 OFFICE O/P EST MOD 30 MIN: CPT | Mod: S$PBB,,, | Performed by: FAMILY MEDICINE

## 2017-10-06 PROCEDURE — 99999 PR PBB SHADOW E&M-EST. PATIENT-LVL III: CPT | Mod: PBBFAC,,, | Performed by: FAMILY MEDICINE

## 2017-10-06 RX ORDER — FLUTICASONE PROPIONATE 50 MCG
2 SPRAY, SUSPENSION (ML) NASAL DAILY
Qty: 16 G | Refills: 11 | Status: SHIPPED | OUTPATIENT
Start: 2017-10-06 | End: 2018-05-07

## 2017-11-06 RX ORDER — ATORVASTATIN CALCIUM 40 MG/1
TABLET, FILM COATED ORAL
Qty: 90 TABLET | Refills: 3 | Status: SHIPPED | OUTPATIENT
Start: 2017-11-06 | End: 2019-01-01 | Stop reason: SDUPTHER

## 2018-01-01 ENCOUNTER — PATIENT MESSAGE (OUTPATIENT)
Dept: NEUROLOGY | Facility: CLINIC | Age: 78
End: 2018-01-01

## 2018-01-01 ENCOUNTER — OFFICE VISIT (OUTPATIENT)
Dept: OTOLARYNGOLOGY | Facility: CLINIC | Age: 78
End: 2018-01-01
Payer: MEDICARE

## 2018-01-01 ENCOUNTER — PATIENT OUTREACH (OUTPATIENT)
Dept: ADMINISTRATIVE | Facility: HOSPITAL | Age: 78
End: 2018-01-01

## 2018-01-01 ENCOUNTER — OFFICE VISIT (OUTPATIENT)
Dept: FAMILY MEDICINE | Facility: CLINIC | Age: 78
End: 2018-01-01
Payer: MEDICARE

## 2018-01-01 ENCOUNTER — TELEPHONE (OUTPATIENT)
Dept: NEUROLOGY | Facility: CLINIC | Age: 78
End: 2018-01-01

## 2018-01-01 ENCOUNTER — RESEARCH ENCOUNTER (OUTPATIENT)
Dept: RESEARCH | Facility: HOSPITAL | Age: 78
End: 2018-01-01

## 2018-01-01 ENCOUNTER — LAB VISIT (OUTPATIENT)
Dept: LAB | Facility: HOSPITAL | Age: 78
End: 2018-01-01
Attending: PSYCHIATRY & NEUROLOGY
Payer: MEDICARE

## 2018-01-01 ENCOUNTER — HOSPITAL ENCOUNTER (OUTPATIENT)
Dept: RADIOLOGY | Facility: HOSPITAL | Age: 78
Discharge: HOME | End: 2018-11-26
Attending: FAMILY MEDICINE
Payer: MEDICARE

## 2018-01-01 ENCOUNTER — IMMUNIZATION (OUTPATIENT)
Dept: INTERNAL MEDICINE | Facility: CLINIC | Age: 78
End: 2018-01-01
Payer: MEDICARE

## 2018-01-01 ENCOUNTER — OFFICE VISIT (OUTPATIENT)
Dept: NEUROLOGY | Facility: CLINIC | Age: 78
End: 2018-01-01
Payer: MEDICARE

## 2018-01-01 ENCOUNTER — SOCIAL WORK (OUTPATIENT)
Dept: NEUROLOGY | Facility: CLINIC | Age: 78
End: 2018-01-01

## 2018-01-01 VITALS
OXYGEN SATURATION: 96 % | WEIGHT: 123 LBS | HEART RATE: 84 BPM | BODY MASS INDEX: 24.15 KG/M2 | HEIGHT: 60 IN | DIASTOLIC BLOOD PRESSURE: 78 MMHG | SYSTOLIC BLOOD PRESSURE: 114 MMHG

## 2018-01-01 VITALS — BODY MASS INDEX: 23.72 KG/M2 | HEIGHT: 60 IN | WEIGHT: 120.81 LBS | OXYGEN SATURATION: 94 %

## 2018-01-01 VITALS
BODY MASS INDEX: 24.32 KG/M2 | SYSTOLIC BLOOD PRESSURE: 130 MMHG | WEIGHT: 123.88 LBS | HEIGHT: 60 IN | DIASTOLIC BLOOD PRESSURE: 62 MMHG | RESPIRATION RATE: 20 BRPM | HEART RATE: 93 BPM

## 2018-01-01 VITALS — WEIGHT: 126.31 LBS | HEIGHT: 60 IN | BODY MASS INDEX: 24.8 KG/M2

## 2018-01-01 DIAGNOSIS — R49.0 HOARSE VOICE QUALITY: ICD-10-CM

## 2018-01-01 DIAGNOSIS — R29.2 HYPERREFLEXIA: ICD-10-CM

## 2018-01-01 DIAGNOSIS — M21.371 RIGHT FOOT DROP: ICD-10-CM

## 2018-01-01 DIAGNOSIS — G12.21 ALS (AMYOTROPHIC LATERAL SCLEROSIS): Primary | ICD-10-CM

## 2018-01-01 DIAGNOSIS — R53.83 OTHER FATIGUE: ICD-10-CM

## 2018-01-01 DIAGNOSIS — J32.9 RHINOSINUSITIS: ICD-10-CM

## 2018-01-01 DIAGNOSIS — K21.9 GASTROESOPHAGEAL REFLUX DISEASE, ESOPHAGITIS PRESENCE NOT SPECIFIED: ICD-10-CM

## 2018-01-01 DIAGNOSIS — R13.10 DYSPHAGIA, UNSPECIFIED TYPE: ICD-10-CM

## 2018-01-01 DIAGNOSIS — G12.21 ALS (AMYOTROPHIC LATERAL SCLEROSIS): ICD-10-CM

## 2018-01-01 DIAGNOSIS — R13.12 OROPHARYNGEAL DYSPHAGIA: ICD-10-CM

## 2018-01-01 DIAGNOSIS — K21.9 LARYNGOPHARYNGEAL REFLUX (LPR): Primary | ICD-10-CM

## 2018-01-01 DIAGNOSIS — Z78.0 MENOPAUSE: ICD-10-CM

## 2018-01-01 DIAGNOSIS — R05.9 COUGH: ICD-10-CM

## 2018-01-01 DIAGNOSIS — Z00.6 RESEARCH STUDY PATIENT: ICD-10-CM

## 2018-01-01 DIAGNOSIS — Z78.9 IMPAIRED MOBILITY AND ADLS: ICD-10-CM

## 2018-01-01 DIAGNOSIS — Z00.8 NUTRITIONAL ASSESSMENT: ICD-10-CM

## 2018-01-01 DIAGNOSIS — J96.10 CHRONIC NEUROMUSCULAR RESPIRATORY FAILURE: ICD-10-CM

## 2018-01-01 DIAGNOSIS — Z74.09 IMPAIRED MOBILITY AND ADLS: ICD-10-CM

## 2018-01-01 DIAGNOSIS — R47.1 DYSARTHRIA: ICD-10-CM

## 2018-01-01 LAB
ACE SERPL-CCNC: 28 U/L
ACHR BIND AB SER-SCNC: 0 NMOL/L
ALBUMIN SERPL BCP-MCNC: 3.6 G/DL
ALBUMIN SERPL BCP-MCNC: 3.8 G/DL
ALP SERPL-CCNC: 101 U/L
ALP SERPL-CCNC: 85 U/L
ALT SERPL W/O P-5'-P-CCNC: 20 U/L
ALT SERPL W/O P-5'-P-CCNC: 22 U/L
AMPHIPHYSIN AB TITR SER: NEGATIVE TITER
ANION GAP SERPL CALC-SCNC: 10 MMOL/L
AST SERPL-CCNC: 21 U/L
AST SERPL-CCNC: 23 U/L
BASOPHILS # BLD AUTO: 0.02 K/UL
BASOPHILS # BLD AUTO: 0.03 K/UL
BASOPHILS NFR BLD: 0.3 %
BASOPHILS NFR BLD: 0.4 %
BILIRUB DIRECT SERPL-MCNC: 0.2 MG/DL
BILIRUB SERPL-MCNC: 0.5 MG/DL
BILIRUB SERPL-MCNC: 0.5 MG/DL
BUN SERPL-MCNC: 17 MG/DL
CALCIUM SERPL-MCNC: 9.6 MG/DL
CHLORIDE SERPL-SCNC: 102 MMOL/L
CO2 SERPL-SCNC: 26 MMOL/L
CREAT SERPL-MCNC: 0.7 MG/DL
CV2 IGG TITR SER: NEGATIVE TITER
DEPRECATED GD1B DISIALYL IGG SER QL: NORMAL
DEPRECATED GD1B DISIALYL IGM SER QL: NORMAL
DIFFERENTIAL METHOD: ABNORMAL
DIFFERENTIAL METHOD: NORMAL
EOSINOPHIL # BLD AUTO: 0.1 K/UL
EOSINOPHIL # BLD AUTO: 0.1 K/UL
EOSINOPHIL NFR BLD: 1.2 %
EOSINOPHIL NFR BLD: 1.3 %
ERYTHROCYTE [DISTWIDTH] IN BLOOD BY AUTOMATED COUNT: 11.9 %
ERYTHROCYTE [DISTWIDTH] IN BLOOD BY AUTOMATED COUNT: 12.1 %
EST. GFR  (AFRICAN AMERICAN): >60 ML/MIN/1.73 M^2
EST. GFR  (NON AFRICAN AMERICAN): >60 ML/MIN/1.73 M^2
GANGLIIOSIDE AB COMMENT: NORMAL
GLIAL NUC TYPE 1 AB TITR SER: NEGATIVE TITER
GLUCOSE SERPL-MCNC: 85 MG/DL
GM1 ASIALO IGG SER QL: NORMAL
GM1 ASIALO IGM SER QL: NORMAL
GM1 GANGL IGG SER QL: NORMAL
GM1 GANGL IGM SER QL: NORMAL
HCT VFR BLD AUTO: 38.6 %
HCT VFR BLD AUTO: 40 %
HGB BLD-MCNC: 12.5 G/DL
HGB BLD-MCNC: 12.7 G/DL
HU1 AB TITR SER: NEGATIVE TITER
HU2 AB TITR SER IF: NEGATIVE TITER
HU3 AB TITR SER: NEGATIVE TITER
IMM GRANULOCYTES # BLD AUTO: 0.01 K/UL
IMM GRANULOCYTES NFR BLD AUTO: 0.1 %
IMMUNOLOGIST REVIEW: NORMAL
LYMPHOCYTES # BLD AUTO: 2.3 K/UL
LYMPHOCYTES # BLD AUTO: 2.5 K/UL
LYMPHOCYTES NFR BLD: 26.8 %
LYMPHOCYTES NFR BLD: 31.3 %
MCH RBC QN AUTO: 30.1 PG
MCH RBC QN AUTO: 30.5 PG
MCHC RBC AUTO-ENTMCNC: 31.8 G/DL
MCHC RBC AUTO-ENTMCNC: 32.4 G/DL
MCV RBC AUTO: 94 FL
MCV RBC AUTO: 95 FL
MONOCYTES # BLD AUTO: 0.7 K/UL
MONOCYTES # BLD AUTO: 0.8 K/UL
MONOCYTES NFR BLD: 10.2 %
MONOCYTES NFR BLD: 8.7 %
NACHR AB SER-SCNC: 0 NMOL/L
NEUTROPHILS # BLD AUTO: 4.5 K/UL
NEUTROPHILS # BLD AUTO: 5.3 K/UL
NEUTROPHILS NFR BLD: 56.9 %
NEUTROPHILS NFR BLD: 62.8 %
NRBC BLD-RTO: 0 /100 WBC
PAVAL REFLEX TEST ADDED: NORMAL
PCA-1 AB TITR SER: NEGATIVE TITER
PCA-2 AB TITR SER: NEGATIVE TITER
PCA-TR AB TITR SER: NEGATIVE TITER
PLATELET # BLD AUTO: 223 K/UL
PLATELET # BLD AUTO: 239 K/UL
PMV BLD AUTO: 10.4 FL
PMV BLD AUTO: 10.6 FL
POTASSIUM SERPL-SCNC: 4.2 MMOL/L
PROT SERPL-MCNC: 6.9 G/DL
PROT SERPL-MCNC: 7.3 G/DL
RBC # BLD AUTO: 4.1 M/UL
RBC # BLD AUTO: 4.22 M/UL
RESEARCH LAB: NORMAL
SODIUM SERPL-SCNC: 138 MMOL/L
STRIA MUS AB TITR SER: NEGATIVE TITER
VGCC-N BIND AB SER-SCNC: 0.02 NMOL/L
VGCC-P/Q BIND AB SER-SCNC: 0 NMOL/L
VGKC AB SER-SCNC: 0 NMOL/L
WBC # BLD AUTO: 7.87 K/UL
WBC # BLD AUTO: 8.47 K/UL

## 2018-01-01 PROCEDURE — 85025 COMPLETE CBC W/AUTO DIFF WBC: CPT

## 2018-01-01 PROCEDURE — 99213 OFFICE O/P EST LOW 20 MIN: CPT | Mod: PBBFAC,PO | Performed by: FAMILY MEDICINE

## 2018-01-01 PROCEDURE — 80076 HEPATIC FUNCTION PANEL: CPT

## 2018-01-01 PROCEDURE — 77080 DXA BONE DENSITY AXIAL: CPT | Mod: 26,,, | Performed by: RADIOLOGY

## 2018-01-01 PROCEDURE — 80053 COMPREHEN METABOLIC PANEL: CPT

## 2018-01-01 PROCEDURE — 90662 IIV NO PRSV INCREASED AG IM: CPT | Mod: PBBFAC

## 2018-01-01 PROCEDURE — 96116 NUBHVL XM PHYS/QHP 1ST HR: CPT | Mod: PBBFAC | Performed by: PSYCHIATRY & NEUROLOGY

## 2018-01-01 PROCEDURE — G8988 SELF CARE GOAL STATUS: HCPCS | Mod: CJ,PO

## 2018-01-01 PROCEDURE — G8999 MOTOR SPEECH CURRENT STATUS: HCPCS | Mod: CI

## 2018-01-01 PROCEDURE — 99213 OFFICE O/P EST LOW 20 MIN: CPT | Mod: PBBFAC,25

## 2018-01-01 PROCEDURE — 92610 EVALUATE SWALLOWING FUNCTION: CPT | Mod: PO

## 2018-01-01 PROCEDURE — 99204 OFFICE O/P NEW MOD 45 MIN: CPT | Mod: S$PBB,,, | Performed by: INTERNAL MEDICINE

## 2018-01-01 PROCEDURE — G9186 MOTOR SPEECH GOAL STATUS: HCPCS | Mod: CI

## 2018-01-01 PROCEDURE — 99214 OFFICE O/P EST MOD 30 MIN: CPT | Mod: PBBFAC,PN | Performed by: PSYCHIATRY & NEUROLOGY

## 2018-01-01 PROCEDURE — G8980 MOBILITY D/C STATUS: HCPCS | Mod: CK,PO

## 2018-01-01 PROCEDURE — 77080 DXA BONE DENSITY AXIAL: CPT | Mod: TC,PO

## 2018-01-01 PROCEDURE — 99999 PR PBB SHADOW E&M-EST. PATIENT-LVL III: CPT | Mod: PBBFAC,,,

## 2018-01-01 PROCEDURE — 99215 OFFICE O/P EST HI 40 MIN: CPT | Mod: S$PBB,,, | Performed by: PSYCHIATRY & NEUROLOGY

## 2018-01-01 PROCEDURE — 99999 PR PBB SHADOW E&M-EST. PATIENT-LVL IV: CPT | Mod: PBBFAC,,, | Performed by: PSYCHIATRY & NEUROLOGY

## 2018-01-01 PROCEDURE — 36415 COLL VENOUS BLD VENIPUNCTURE: CPT | Mod: PO

## 2018-01-01 PROCEDURE — 99213 OFFICE O/P EST LOW 20 MIN: CPT | Mod: S$PBB,,, | Performed by: OTOLARYNGOLOGY

## 2018-01-01 PROCEDURE — G8987 SELF CARE CURRENT STATUS: HCPCS | Mod: CK,PO

## 2018-01-01 PROCEDURE — G9158 MOTOR SPEECH D/C STATUS: HCPCS | Mod: CI

## 2018-01-01 PROCEDURE — 99212 OFFICE O/P EST SF 10 MIN: CPT | Mod: PBBFAC,PO | Performed by: OTOLARYNGOLOGY

## 2018-01-01 PROCEDURE — G8998 SWALLOW D/C STATUS: HCPCS | Mod: CI

## 2018-01-01 PROCEDURE — 92522 EVALUATE SPEECH PRODUCTION: CPT | Mod: PO

## 2018-01-01 PROCEDURE — 99999 PR PBB SHADOW E&M-EST. PATIENT-LVL III: CPT | Mod: PBBFAC,,, | Performed by: FAMILY MEDICINE

## 2018-01-01 PROCEDURE — 99999 PR PBB SHADOW E&M-EST. PATIENT-LVL II: CPT | Mod: PBBFAC,,, | Performed by: OTOLARYNGOLOGY

## 2018-01-01 PROCEDURE — G8978 MOBILITY CURRENT STATUS: HCPCS | Mod: CK,PO

## 2018-01-01 PROCEDURE — G8996 SWALLOW CURRENT STATUS: HCPCS | Mod: CI

## 2018-01-01 PROCEDURE — 36415 COLL VENOUS BLD VENIPUNCTURE: CPT

## 2018-01-01 PROCEDURE — 97165 OT EVAL LOW COMPLEX 30 MIN: CPT | Mod: PO

## 2018-01-01 PROCEDURE — 96116 NUBHVL XM PHYS/QHP 1ST HR: CPT | Mod: S$PBB,,, | Performed by: PSYCHIATRY & NEUROLOGY

## 2018-01-01 PROCEDURE — 97162 PT EVAL MOD COMPLEX 30 MIN: CPT | Mod: PO

## 2018-01-01 PROCEDURE — 99214 OFFICE O/P EST MOD 30 MIN: CPT | Mod: S$PBB,,, | Performed by: FAMILY MEDICINE

## 2018-01-01 PROCEDURE — G8989 SELF CARE D/C STATUS: HCPCS | Mod: CJ,PO

## 2018-01-01 PROCEDURE — 99214 OFFICE O/P EST MOD 30 MIN: CPT | Mod: S$PBB,,, | Performed by: PSYCHIATRY & NEUROLOGY

## 2018-01-01 PROCEDURE — G8997 SWALLOW GOAL STATUS: HCPCS | Mod: CI

## 2018-01-01 PROCEDURE — G8979 MOBILITY GOAL STATUS: HCPCS | Mod: CK,PO

## 2018-01-01 RX ORDER — CETIRIZINE HYDROCHLORIDE 10 MG/1
10 TABLET ORAL DAILY
Refills: 11 | COMMUNITY
Start: 2018-01-01 | End: 2019-11-30

## 2018-01-01 RX ORDER — CETIRIZINE HYDROCHLORIDE 10 MG/1
10 TABLET ORAL DAILY
Refills: 11 | COMMUNITY
Start: 2018-01-01 | End: 2018-01-01 | Stop reason: SDUPTHER

## 2018-01-01 RX ORDER — RILUZOLE 50 MG/1
50 TABLET, FILM COATED ORAL EVERY 12 HOURS
Qty: 60 TABLET | Refills: 11 | Status: SHIPPED | OUTPATIENT
Start: 2018-01-01 | End: 2019-10-25

## 2018-01-26 ENCOUNTER — HOSPITAL ENCOUNTER (OUTPATIENT)
Dept: RADIOLOGY | Facility: HOSPITAL | Age: 78
Discharge: HOME | End: 2018-01-26
Attending: FAMILY MEDICINE
Payer: MEDICARE

## 2018-01-26 ENCOUNTER — OFFICE VISIT (OUTPATIENT)
Dept: FAMILY MEDICINE | Facility: CLINIC | Age: 78
End: 2018-01-26
Payer: MEDICARE

## 2018-01-26 VITALS
HEIGHT: 60 IN | HEART RATE: 89 BPM | SYSTOLIC BLOOD PRESSURE: 128 MMHG | DIASTOLIC BLOOD PRESSURE: 64 MMHG | WEIGHT: 136.88 LBS | BODY MASS INDEX: 26.87 KG/M2 | OXYGEN SATURATION: 97 % | TEMPERATURE: 98 F

## 2018-01-26 DIAGNOSIS — R06.2 WHEEZE: ICD-10-CM

## 2018-01-26 DIAGNOSIS — R05.8 PRODUCTIVE COUGH: ICD-10-CM

## 2018-01-26 DIAGNOSIS — M54.9 MID BACK PAIN: ICD-10-CM

## 2018-01-26 DIAGNOSIS — M54.9 MID BACK PAIN: Primary | ICD-10-CM

## 2018-01-26 PROCEDURE — 99214 OFFICE O/P EST MOD 30 MIN: CPT | Mod: S$PBB,,, | Performed by: FAMILY MEDICINE

## 2018-01-26 PROCEDURE — 99999 PR PBB SHADOW E&M-EST. PATIENT-LVL III: CPT | Mod: PBBFAC,,, | Performed by: FAMILY MEDICINE

## 2018-01-26 PROCEDURE — 99213 OFFICE O/P EST LOW 20 MIN: CPT | Mod: PBBFAC,PO,25 | Performed by: FAMILY MEDICINE

## 2018-01-26 PROCEDURE — 72070 X-RAY EXAM THORAC SPINE 2VWS: CPT | Mod: 26,,, | Performed by: RADIOLOGY

## 2018-01-26 PROCEDURE — 71046 X-RAY EXAM CHEST 2 VIEWS: CPT | Mod: TC,FY,PO

## 2018-01-26 PROCEDURE — 71046 X-RAY EXAM CHEST 2 VIEWS: CPT | Mod: 26,,, | Performed by: RADIOLOGY

## 2018-01-26 PROCEDURE — 72070 X-RAY EXAM THORAC SPINE 2VWS: CPT | Mod: TC,FY,PO

## 2018-01-26 RX ORDER — OMEPRAZOLE 40 MG/1
CAPSULE, DELAYED RELEASE ORAL
COMMUNITY
Start: 2017-11-05 | End: 2018-05-07 | Stop reason: SDUPTHER

## 2018-01-26 RX ORDER — DOXYCYCLINE 100 MG/1
100 CAPSULE ORAL 2 TIMES DAILY
Qty: 20 CAPSULE | Refills: 0 | Status: SHIPPED | OUTPATIENT
Start: 2018-01-26 | End: 2018-05-07

## 2018-01-26 RX ORDER — PREDNISONE 10 MG/1
TABLET ORAL
Qty: 20 TABLET | Refills: 0 | Status: SHIPPED | OUTPATIENT
Start: 2018-01-26 | End: 2018-05-07

## 2018-01-26 NOTE — PROGRESS NOTES
Subjective:       Patient ID: Arianne Johnson is a 77 y.o. female.    Chief Complaint: Back Pain and Shortness of Breath (symptoms since Denise)    HPI     Reports falling on back in September 2017.  Reports no pain but started having mid back back pain approx 1 month ago.  Mild to mod relief from taking otc tylenol.  Less pain 1-2 weeks ago.      Started off with sore throat, rhinorrhea and cough I.e uri, approx 1 month ago. Since then, c/o intermittently coughing up clear phelgm with wheeze and sob on exertion.        Review of Systems      Review of Systems   Constitutional: Negative for fever and chills.   HENT: Negative for hearing loss and neck stiffness.    Eyes: Negative for redness and itching.   Respiratory: Negative for choking.    Cardiovascular: Negative for chest pain and leg swelling.  Abdomen: Negative for abdominal pain and blood in stool.   Genitourinary: Negative for dysuria and flank pain.   Musculoskeletal: Negative for gait problem.   Neurological: Negative for light-headedness and headaches.   Hematological: Negative for adenopathy.   Psychiatric/Behavioral: Negative for behavioral problems.       Objective:      Physical Exam   Constitutional: She appears well-developed.   HENT:   Head: Normocephalic and atraumatic.   Eyes: Conjunctivae are normal. Pupils are equal, round, and reactive to light.   Neck: Normal range of motion.   Cardiovascular: Normal rate and regular rhythm.    No murmur heard.  Pulmonary/Chest: Effort normal and breath sounds normal. She has no wheezes.   Musculoskeletal:   Thoracic spine: tend of T6-10 spinous process and bilat paravert areas. Pain with overhead movements.    Lymphadenopathy:     She has no cervical adenopathy.       Assessment:       1. Mid back pain    2. Productive cough    3. Wheeze        Plan:       Mid back pain  -     X-Ray Thoracic Spine AP Lateral; Future; Expected date: 01/26/2018  -     Ambulatory Referral to Physical/Occupational  Therapy    Productive cough  -     X-Ray Chest PA And Lateral; Future; Expected date: 01/26/2018    Wheeze  -     X-Ray Chest PA And Lateral; Future; Expected date: 01/26/2018    Other orders  -     predniSONE (DELTASONE) 10 MG tablet; Take 4 tabs daily for 2 days then Take 3 tabs daily for 2 days thenTake 2 tabs daily for 2 days then Take 1 tab daily for 2 days then stop  Dispense: 20 tablet; Refill: 0  -     doxycycline (MONODOX) 100 MG capsule; Take 1 capsule (100 mg total) by mouth 2 (two) times daily.  Dispense: 20 capsule; Refill: 0      Plan:  Start prednisone taper and doxy  See orders          Medication List with Changes/Refills   New Medications    DOXYCYCLINE (MONODOX) 100 MG CAPSULE    Take 1 capsule (100 mg total) by mouth 2 (two) times daily.    PREDNISONE (DELTASONE) 10 MG TABLET    Take 4 tabs daily for 2 days then Take 3 tabs daily for 2 days thenTake 2 tabs daily for 2 days then Take 1 tab daily for 2 days then stop   Current Medications    ATORVASTATIN (LIPITOR) 40 MG TABLET    TAKE 1 TABLET(40 MG) BY MOUTH EVERY DAY    CETIRIZINE (ZYRTEC) 10 MG TABLET    Take 10 mg by mouth once daily.    FLUTICASONE (FLONASE) 50 MCG/ACTUATION NASAL SPRAY    2 sprays by Each Nare route once daily.    IBANDRONATE (BONIVA) 150 MG TABLET    Take 1 tablet (150 mg total) by mouth every 30 days.    MECLIZINE (ANTIVERT) 25 MG TABLET    Take 1 tablet (25 mg total) by mouth 3 (three) times daily as needed.    OMEPRAZOLE (PRILOSEC) 40 MG CAPSULE

## 2018-01-28 ENCOUNTER — TELEPHONE (OUTPATIENT)
Dept: FAMILY MEDICINE | Facility: CLINIC | Age: 78
End: 2018-01-28

## 2018-01-29 ENCOUNTER — PATIENT MESSAGE (OUTPATIENT)
Dept: FAMILY MEDICINE | Facility: CLINIC | Age: 78
End: 2018-01-29

## 2018-01-29 ENCOUNTER — TELEPHONE (OUTPATIENT)
Dept: FAMILY MEDICINE | Facility: CLINIC | Age: 78
End: 2018-01-29

## 2018-01-29 NOTE — TELEPHONE ENCOUNTER
inform pt via phone that I reviewed the test results and note the following:    Your chest xray was normal.    Your thoracic spine xray was negative for arthritis or fracture.

## 2018-01-29 NOTE — TELEPHONE ENCOUNTER
----- Message from Juan Hunter sent at 1/29/2018  3:19 PM CST -----  Contact: Patient  Arianne, [phone], returning nurse's call for Friday's Xray results. Please advise. Thanks.

## 2018-01-29 NOTE — TELEPHONE ENCOUNTER
----- Message from Malinda Alvarado sent at 1/29/2018 12:40 PM CST -----  Contact: Patient  Patient is returning phone call to doctors office.  Call Back#[phone]  Thanks

## 2018-02-07 RX ORDER — IBANDRONATE SODIUM 150 MG/1
TABLET, FILM COATED ORAL
Qty: 1 TABLET | Refills: 11 | Status: SHIPPED | OUTPATIENT
Start: 2018-02-07 | End: 2019-01-01

## 2018-02-10 RX ORDER — OMEPRAZOLE 40 MG/1
CAPSULE, DELAYED RELEASE ORAL
Qty: 90 CAPSULE | Refills: 3 | Status: SHIPPED | OUTPATIENT
Start: 2018-02-10 | End: 2018-05-07 | Stop reason: SDUPTHER

## 2018-02-12 RX ORDER — ATORVASTATIN CALCIUM 40 MG/1
TABLET, FILM COATED ORAL
Qty: 90 TABLET | Refills: 3 | Status: SHIPPED | OUTPATIENT
Start: 2018-02-12 | End: 2018-05-07 | Stop reason: SDUPTHER

## 2018-02-12 RX ORDER — OMEPRAZOLE 40 MG/1
CAPSULE, DELAYED RELEASE ORAL
Qty: 30 CAPSULE | Refills: 11 | Status: SHIPPED | OUTPATIENT
Start: 2018-02-12 | End: 2019-01-01 | Stop reason: SDUPTHER

## 2018-02-21 ENCOUNTER — NURSE TRIAGE (OUTPATIENT)
Dept: ADMINISTRATIVE | Facility: CLINIC | Age: 78
End: 2018-02-21

## 2018-02-21 NOTE — TELEPHONE ENCOUNTER
"    Reason for Disposition   Difficulty breathing    Answer Assessment - Initial Assessment Questions  1. DESCRIPTION: "Please describe your heart rate or heart beat that you are having" (e.g., fast/slow, regular/irregular, skipped or extra beats, "palpitations")    Rapid, 115 HR last night at 1800,    2. ONSET: "When did it start?" (Minutes, hours or days)       unsure  3. DURATION: "How long does it last" (e.g., seconds, minutes, hours)      unsure  4. PATTERN "Does it come and go, or has it been constant since it started?"  "Does it get worse with exertion?"   "Are you feeling it now?"      unsure  5. TAP: "Using your hand, can you tap out what you are feeling on a chair or table in front of you, so that I can hear?" (Note: not all patients can do this)        Pt is unaware of rapid heart rate  6. HEART RATE: "Can you tell me your heart rate?" "How many beats in 15 seconds?"  (Note: not all patients can do this)        It is now 93  7. RECURRENT SYMPTOM: "Have you ever had this before?" If so, ask: "When was the last time?" and "What happened that time?"       unsure  8. CAUSE: "What do you think is causing the palpitations?"      unsure  9. CARDIAC HISTORY: "Do you have any history of heart disease?" (e.g., heart attack, angina, bypass surgery, angioplasty, arrhythmia)       no  10. OTHER SYMPTOMS: "Do you have any other symptoms?" (e.g., dizziness, chest pain, sweating, difficulty breathing)      Shortness of breath, intermittent , with small amt of exertion.  Pt reports this has been going on for several weeks.  11. PREGNANCY: "Is there any chance you are pregnant?" "When was your last menstrual period?"      No    Blood pressure is slightly elevated, @ 1800  last pm  96% O2,                                                                 0730 TODAY 124/70, HR 89                                                               1230  Today /73, HR 93  Pt state she is sob currently, while at " rest    Protocols used: ST HEART RATE AND HEART BEAT [id number]

## 2018-02-23 ENCOUNTER — OFFICE VISIT (OUTPATIENT)
Dept: CARDIOLOGY | Facility: CLINIC | Age: 78
End: 2018-02-23
Payer: MEDICARE

## 2018-02-23 VITALS
HEART RATE: 85 BPM | DIASTOLIC BLOOD PRESSURE: 70 MMHG | BODY MASS INDEX: 27.09 KG/M2 | WEIGHT: 138 LBS | HEIGHT: 60 IN | SYSTOLIC BLOOD PRESSURE: 119 MMHG

## 2018-02-23 DIAGNOSIS — I20.9 ANGINA PECTORIS: ICD-10-CM

## 2018-02-23 DIAGNOSIS — R06.02 SOB (SHORTNESS OF BREATH): ICD-10-CM

## 2018-02-23 DIAGNOSIS — I49.3 PVC (PREMATURE VENTRICULAR CONTRACTION): ICD-10-CM

## 2018-02-23 DIAGNOSIS — Z91.89 AT RISK FOR CORONARY ARTERY DISEASE: ICD-10-CM

## 2018-02-23 DIAGNOSIS — R06.09 DOE (DYSPNEA ON EXERTION): ICD-10-CM

## 2018-02-23 DIAGNOSIS — I49.1 PAC (PREMATURE ATRIAL CONTRACTION): ICD-10-CM

## 2018-02-23 PROCEDURE — 99213 OFFICE O/P EST LOW 20 MIN: CPT | Mod: PBBFAC,PO | Performed by: INTERNAL MEDICINE

## 2018-02-23 PROCEDURE — 99204 OFFICE O/P NEW MOD 45 MIN: CPT | Mod: S$PBB,,, | Performed by: INTERNAL MEDICINE

## 2018-02-23 PROCEDURE — 1126F AMNT PAIN NOTED NONE PRSNT: CPT | Mod: ,,, | Performed by: INTERNAL MEDICINE

## 2018-02-23 PROCEDURE — 99999 PR PBB SHADOW E&M-EST. PATIENT-LVL III: CPT | Mod: PBBFAC,,, | Performed by: INTERNAL MEDICINE

## 2018-02-23 PROCEDURE — 1159F MED LIST DOCD IN RCRD: CPT | Mod: ,,, | Performed by: INTERNAL MEDICINE

## 2018-02-23 NOTE — PROGRESS NOTES
Subjective:    Patient ID:  Arianne Johnson is a 77 y.o. female who presents for evaluation of new pt (new pt); Hospital Follow Up; Shortness of Breath; and Palpitations      HPI77 yo WF who states she has had SOB and IQBAL for a while but it is continually getting worse. States with minimal exertion she is very SOB and heart rate accelerates. No definitive CP.Had holter in the past that showed PAC's and PVC's. CXR shows no CHF and BNP normal    Review of Systems   Constitution: Negative for decreased appetite, fever, weakness, malaise/fatigue, weight gain and weight loss.   HENT: Negative for hearing loss and nosebleeds.    Eyes: Negative for visual disturbance.   Cardiovascular: Positive for dyspnea on exertion and palpitations. Negative for chest pain, claudication, cyanosis, irregular heartbeat, leg swelling, near-syncope, orthopnea, paroxysmal nocturnal dyspnea and syncope.   Respiratory: Positive for shortness of breath. Negative for cough, hemoptysis, sleep disturbances due to breathing, snoring and wheezing.    Endocrine: Negative for cold intolerance, heat intolerance, polydipsia and polyuria.   Hematologic/Lymphatic: Negative for adenopathy and bleeding problem. Does not bruise/bleed easily.   Skin: Negative for color change, itching, poor wound healing, rash and suspicious lesions.   Musculoskeletal: Negative for arthritis, back pain, falls, joint pain, joint swelling, muscle cramps, muscle weakness and myalgias.   Gastrointestinal: Negative for bloating, abdominal pain, change in bowel habit, constipation, flatus, heartburn, hematemesis, hematochezia, hemorrhoids, jaundice, melena, nausea and vomiting.   Genitourinary: Negative for bladder incontinence, decreased libido, frequency, hematuria, hesitancy and urgency.   Neurological: Negative for brief paralysis, difficulty with concentration, excessive daytime sleepiness, dizziness, focal weakness, headaches, light-headedness, loss of balance, numbness and  vertigo.   Psychiatric/Behavioral: Negative for altered mental status, depression and memory loss. The patient does not have insomnia and is not nervous/anxious.    Allergic/Immunologic: Negative for environmental allergies, hives and persistent infections.        Objective:    Physical Exam   Constitutional: She is oriented to person, place, and time. She appears well-developed and well-nourished.   /70 (BP Location: Left arm, Patient Position: Sitting, BP Method: Medium (Automatic))   Pulse 85   Ht 5' (1.524 m)   Wt 62.6 kg (138 lb 0.1 oz)   BMI 26.95 kg/m²      HENT:   Head: Normocephalic and atraumatic.   Right Ear: External ear normal.   Left Ear: External ear normal.   Nose: Nose normal.   Mouth/Throat: Oropharynx is clear and moist.   Eyes: Conjunctivae, EOM and lids are normal. Pupils are equal, round, and reactive to light. Right eye exhibits no discharge. Left eye exhibits no discharge. Right conjunctiva has no hemorrhage. No scleral icterus.   Neck: Normal range of motion. Neck supple. No JVD present. No tracheal deviation present. No thyromegaly present.   Cardiovascular: Normal rate, regular rhythm, normal heart sounds and intact distal pulses.  Exam reveals no gallop and no friction rub.    No murmur heard.  Pulmonary/Chest: Effort normal and breath sounds normal. No respiratory distress. She has no wheezes. She has no rales. She exhibits no tenderness. Breasts are symmetrical.   Abdominal: Soft. Bowel sounds are normal. She exhibits no distension and no mass. There is no hepatosplenomegaly or hepatomegaly. There is no tenderness. There is no rebound and no guarding.   Musculoskeletal: Normal range of motion. She exhibits no edema or tenderness.   Lymphadenopathy:     She has no cervical adenopathy.   Neurological: She is alert and oriented to person, place, and time. She displays normal reflexes. No cranial nerve deficit. Coordination normal.   Skin: Skin is warm and dry. No rash noted. No  erythema. No pallor.   Psychiatric: She has a normal mood and affect. Her behavior is normal. Judgment and thought content normal.   Nursing note and vitals reviewed.        Assessment:       1. PAC (premature atrial contraction)    2. PVC (premature ventricular contraction)    3. SOB (shortness of breath)    4. IQBAL (dyspnea on exertion)    5. At risk for coronary artery disease    6. Angina pectoris          Plan:     Symptoms are not classic for angina and thus far no evidence of CHF      Orders Placed This Encounter   Procedures    NM Myocardial Perfusion Spect Multi Pharmacologic    NM Multi Pharm Stress Cardiac Component    2D echo with color flow doppler     Follow-up in about 3 months (around 5/23/2018).

## 2018-03-23 ENCOUNTER — TELEPHONE (OUTPATIENT)
Dept: FAMILY MEDICINE | Facility: CLINIC | Age: 78
End: 2018-03-23

## 2018-03-23 NOTE — TELEPHONE ENCOUNTER
----- Message from Pinky Farah sent at 3/23/2018  3:47 PM CDT -----  Contact: self  Patient is returning Alejandra's call regarding appointment. Patient states she is very interested in change to the [place] office.  Please call patient at [phone].  Thanks!

## 2018-03-23 NOTE — TELEPHONE ENCOUNTER
Attempted to contact pt to change appt from [facility] to [facility]. No answer. LVM to contact office.

## 2018-04-12 ENCOUNTER — PATIENT MESSAGE (OUTPATIENT)
Dept: CARDIOLOGY | Facility: CLINIC | Age: 78
End: 2018-04-12

## 2018-05-07 ENCOUNTER — OFFICE VISIT (OUTPATIENT)
Dept: PRIMARY CARE CLINIC | Facility: CLINIC | Age: 78
End: 2018-05-07
Payer: MEDICARE

## 2018-05-07 VITALS
SYSTOLIC BLOOD PRESSURE: 116 MMHG | DIASTOLIC BLOOD PRESSURE: 62 MMHG | HEART RATE: 96 BPM | HEIGHT: 60 IN | BODY MASS INDEX: 26.01 KG/M2 | TEMPERATURE: 98 F | WEIGHT: 132.5 LBS

## 2018-05-07 DIAGNOSIS — M81.0 AGE-RELATED OSTEOPOROSIS WITHOUT CURRENT PATHOLOGICAL FRACTURE: ICD-10-CM

## 2018-05-07 DIAGNOSIS — E78.2 MIXED HYPERLIPIDEMIA: ICD-10-CM

## 2018-05-07 DIAGNOSIS — R42 CHRONIC VERTIGO: ICD-10-CM

## 2018-05-07 DIAGNOSIS — J44.89 COPD WITH ASTHMA: Primary | ICD-10-CM

## 2018-05-07 PROCEDURE — 99213 OFFICE O/P EST LOW 20 MIN: CPT | Mod: S$GLB,,, | Performed by: NURSE PRACTITIONER

## 2018-05-07 RX ORDER — MECLIZINE HYDROCHLORIDE 25 MG/1
25 TABLET ORAL 3 TIMES DAILY PRN
Qty: 30 TABLET | Refills: 3 | Status: SHIPPED | OUTPATIENT
Start: 2018-05-07 | End: 2019-01-01 | Stop reason: SDUPTHER

## 2018-05-07 RX ORDER — MONTELUKAST SODIUM 10 MG/1
10 TABLET ORAL NIGHTLY
COMMUNITY
Start: 2018-05-02 | End: 2019-01-01 | Stop reason: SDUPTHER

## 2018-05-07 RX ORDER — ALBUTEROL SULFATE 90 UG/1
1 AEROSOL, METERED RESPIRATORY (INHALATION) EVERY 6 HOURS PRN
Status: ON HOLD | COMMUNITY
Start: 2018-03-07 | End: 2019-01-01 | Stop reason: HOSPADM

## 2018-05-07 RX ORDER — FLUTICASONE FUROATE AND VILANTEROL TRIFENATATE 100; 25 UG/1; UG/1
POWDER RESPIRATORY (INHALATION)
COMMUNITY
Start: 2018-05-02 | End: 2018-07-09

## 2018-05-07 NOTE — PROGRESS NOTES
Subjective:       Patient ID: Arianne Johnson is a 77 y.o. female.    Chief Complaint: Hyperlipidemia  She last saw Siva on 01/28/2018. This is her first time seeing me in the clinic.   HPI   She is here to follow up with chronic medical diagnosis including hyperlipidemia and COPD.   Vitals:    05/07/18 1329   BP: 116/62   Pulse: 96   Temp: 98.1 °F (36.7 °C)     BP Readings from Last 3 Encounters:   05/07/18 116/62   02/23/18 119/70   02/21/18 (!) 142/66     Review of Systems   Respiratory: Positive for shortness of breath.         Shortness of breath with walking       She has been on inhalers since March of this year and was given the BREO by pulmonary and she is going to follow up with her in June. She is having nuclear stress test and echo tomorrow  States she did not start the PT ordered by Dr. Paniagua. She states not hurting  CMP  Sodium   Date Value Ref Range Status   02/21/2018 142 136 - 145 mmol/L Final     Potassium   Date Value Ref Range Status   02/21/2018 4.4 3.5 - 5.1 mmol/L Final     Chloride   Date Value Ref Range Status   02/21/2018 104 95 - 110 mmol/L Final     CO2   Date Value Ref Range Status   02/21/2018 28 22 - 31 mmol/L Final     Glucose   Date Value Ref Range Status   02/21/2018 97 70 - 110 mg/dL Final     Comment:     The ADA recommends the following guidelines for fasting glucose:  Normal:       less than 100 mg/dL  Prediabetes:  100 mg/dL to 125 mg/dL  Diabetes:     126 mg/dL or higher       BUN, Bld   Date Value Ref Range Status   02/21/2018 15 7 - 18 mg/dL Final     Creatinine   Date Value Ref Range Status   02/21/2018 0.84 0.50 - 1.40 mg/dL Final     Calcium   Date Value Ref Range Status   02/21/2018 9.2 8.4 - 10.2 mg/dL Final     Total Protein   Date Value Ref Range Status   02/21/2018 7.4 6.0 - 8.4 g/dL Final     Albumin   Date Value Ref Range Status   02/21/2018 4.3 3.5 - 5.2 g/dL Final     Total Bilirubin   Date Value Ref Range Status   02/21/2018 0.4 0.2 - 1.3 mg/dL Final      Alkaline Phosphatase   Date Value Ref Range Status   02/21/2018 87 38 - 145 U/L Final     AST   Date Value Ref Range Status   02/21/2018 28 14 - 36 U/L Final     ALT   Date Value Ref Range Status   02/21/2018 40 10 - 44 U/L Final     Anion Gap   Date Value Ref Range Status   02/21/2018 10 8 - 16 mmol/L Final     eGFR if    Date Value Ref Range Status   02/21/2018 >60 >60 mL/min/1.73 m^2 Final     eGFR if non    Date Value Ref Range Status   02/21/2018 >60 >60 mL/min/1.73 m^2 Final     Comment:     Calculation used to obtain the estimated glomerular filtration  rate (eGFR) is the CKD-EPI equation.        Lab Results   Component Value Date    CHOL 151 07/19/2017    CHOL 266 (H) 01/18/2017    CHOL 215 (H) 05/03/2013     Lab Results   Component Value Date    HDL 46 07/19/2017    HDL 46 01/18/2017    HDL 52 05/03/2013     Lab Results   Component Value Date    LDLCALC 80.8 07/19/2017    LDLCALC 175.8 (H) 01/18/2017    LDLCALC 145.0 05/03/2013     Lab Results   Component Value Date    TRIG 121 07/19/2017    TRIG 221 (H) 01/18/2017    TRIG 89 05/03/2013     Lab Results   Component Value Date    CHOLHDL 30.5 07/19/2017    CHOLHDL 17.3 (L) 01/18/2017    CHOLHDL 24.2 05/03/2013     Objective:      Physical Exam   Constitutional: She is oriented to person, place, and time. Vital signs are normal. She appears well-developed and well-nourished. She is active.   HENT:   Head: Normocephalic and atraumatic.   Right Ear: Hearing, tympanic membrane, external ear and ear canal normal.   Left Ear: Hearing, tympanic membrane, external ear and ear canal normal.   Nose: Nose normal.   Mouth/Throat: Uvula is midline, oropharynx is clear and moist and mucous membranes are normal.   Eyes: Lids are normal.   Neck: Trachea normal, normal range of motion, full passive range of motion without pain and phonation normal. Neck supple.   Cardiovascular: Normal rate and regular rhythm.    Pulmonary/Chest: Effort  normal and breath sounds normal.   Abdominal: Soft. Bowel sounds are normal. There is no splenomegaly or hepatomegaly. There is no tenderness.   Musculoskeletal: Normal range of motion.   Lymphadenopathy:        Head (right side): No submental, no submandibular, no tonsillar, no preauricular, no posterior auricular and no occipital adenopathy present.        Head (left side): No submental, no submandibular, no tonsillar, no preauricular, no posterior auricular and no occipital adenopathy present.     She has no cervical adenopathy.   Neurological: She is alert and oriented to person, place, and time.   Skin: Skin is warm, dry and intact.   Psychiatric: She has a normal mood and affect. Her speech is normal and behavior is normal. Judgment and thought content normal. Cognition and memory are normal.   Nursing note and vitals reviewed.      Assessment & Plan:       COPD with asthma    Mixed hyperlipidemia  -     Lipid panel; Future; Expected date: 05/07/2018    Chronic vertigo  -     meclizine (ANTIVERT) 25 mg tablet; Take 1 tablet (25 mg total) by mouth 3 (three) times daily as needed.  Dispense: 30 tablet; Refill: 3    Age-related osteoporosis without current pathological fracture      Medication List with Changes/Refills   Current Medications    ATORVASTATIN (LIPITOR) 40 MG TABLET    TAKE 1 TABLET(40 MG) BY MOUTH EVERY DAY    BREO ELLIPTA 100-25 MCG/DOSE DISKUS INHALER        IBANDRONATE (BONIVA) 150 MG TABLET    TAKE 1 TABLET(150 MG) BY MOUTH EVERY 30 DAYS    MONTELUKAST (SINGULAIR) 10 MG TABLET        OMEPRAZOLE (PRILOSEC) 40 MG CAPSULE    TAKE 1 CAPSULE(40 MG) BY MOUTH EVERY DAY    PROAIR HFA 90 MCG/ACTUATION INHALER       Changed and/or Refilled Medications    Modified Medication Previous Medication    MECLIZINE (ANTIVERT) 25 MG TABLET meclizine (ANTIVERT) 25 mg tablet       Take 1 tablet (25 mg total) by mouth 3 (three) times daily as needed.    Take 1 tablet (25 mg total) by mouth 3 (three) times daily as  needed.   Discontinued Medications    ATORVASTATIN (LIPITOR) 40 MG TABLET    TAKE 1 TABLET(40 MG) BY MOUTH EVERY DAY    CETIRIZINE (ZYRTEC) 10 MG TABLET    Take 10 mg by mouth once daily.    DOXYCYCLINE (MONODOX) 100 MG CAPSULE    Take 1 capsule (100 mg total) by mouth 2 (two) times daily.    FLUTICASONE (FLONASE) 50 MCG/ACTUATION NASAL SPRAY    2 sprays by Each Nare route once daily.    OMEPRAZOLE (PRILOSEC) 40 MG CAPSULE        OMEPRAZOLE (PRILOSEC) 40 MG CAPSULE    TAKE 1 CAPSULE BY MOUTH EVERY DAY    PREDNISONE (DELTASONE) 10 MG TABLET    Take 4 tabs daily for 2 days then Take 3 tabs daily for 2 days thenTake 2 tabs daily for 2 days then Take 1 tab daily for 2 days then stop       Refuses PCV13 today  Follow-up in about 4 months (around 9/7/2018), or if symptoms worsen or fail to improve.

## 2018-05-08 ENCOUNTER — HOSPITAL ENCOUNTER (OUTPATIENT)
Dept: RADIOLOGY | Facility: HOSPITAL | Age: 78
Discharge: HOME | End: 2018-05-08
Attending: INTERNAL MEDICINE
Payer: MEDICARE

## 2018-05-08 ENCOUNTER — CLINICAL SUPPORT (OUTPATIENT)
Dept: CARDIOLOGY | Facility: CLINIC | Age: 78
End: 2018-05-08
Attending: INTERNAL MEDICINE
Payer: MEDICARE

## 2018-05-08 DIAGNOSIS — Z91.89 AT RISK FOR CORONARY ARTERY DISEASE: ICD-10-CM

## 2018-05-08 DIAGNOSIS — I49.1 PAC (PREMATURE ATRIAL CONTRACTION): ICD-10-CM

## 2018-05-08 DIAGNOSIS — R06.09 DOE (DYSPNEA ON EXERTION): ICD-10-CM

## 2018-05-08 DIAGNOSIS — I20.9 ANGINA PECTORIS: ICD-10-CM

## 2018-05-08 DIAGNOSIS — R06.02 SOB (SHORTNESS OF BREATH): ICD-10-CM

## 2018-05-08 DIAGNOSIS — I49.3 PVC (PREMATURE VENTRICULAR CONTRACTION): ICD-10-CM

## 2018-05-08 LAB — DIASTOLIC DYSFUNCTION: NO

## 2018-05-08 PROCEDURE — 93306 TTE W/DOPPLER COMPLETE: CPT | Mod: PBBFAC,PO | Performed by: INTERNAL MEDICINE

## 2018-05-08 PROCEDURE — 93018 CV STRESS TEST I&R ONLY: CPT | Mod: S$PBB,,, | Performed by: INTERNAL MEDICINE

## 2018-05-08 PROCEDURE — 78452 HT MUSCLE IMAGE SPECT MULT: CPT | Mod: 26,,, | Performed by: INTERNAL MEDICINE

## 2018-05-08 PROCEDURE — 93016 CV STRESS TEST SUPVJ ONLY: CPT | Mod: S$PBB,,, | Performed by: INTERNAL MEDICINE

## 2018-05-08 PROCEDURE — 93017 CV STRESS TEST TRACING ONLY: CPT | Mod: PBBFAC,PO | Performed by: INTERNAL MEDICINE

## 2018-05-10 ENCOUNTER — TELEPHONE (OUTPATIENT)
Dept: CARDIOLOGY | Facility: CLINIC | Age: 78
End: 2018-05-10

## 2018-05-10 LAB
ESTIMATED PA SYSTOLIC PRESSURE: 34.36
MITRAL VALVE MOBILITY: NORMAL
RETIRED EF AND QEF - SEE NOTES: 60 (ref 55–65)
TRICUSPID VALVE REGURGITATION: NORMAL

## 2018-07-09 ENCOUNTER — HOSPITAL ENCOUNTER (OUTPATIENT)
Dept: RADIOLOGY | Facility: HOSPITAL | Age: 78
Discharge: HOME | End: 2018-07-09
Attending: FAMILY MEDICINE
Payer: MEDICARE

## 2018-07-09 ENCOUNTER — OFFICE VISIT (OUTPATIENT)
Dept: FAMILY MEDICINE | Facility: CLINIC | Age: 78
End: 2018-07-09
Payer: MEDICARE

## 2018-07-09 VITALS
OXYGEN SATURATION: 94 % | SYSTOLIC BLOOD PRESSURE: 108 MMHG | BODY MASS INDEX: 25.79 KG/M2 | DIASTOLIC BLOOD PRESSURE: 60 MMHG | WEIGHT: 131.38 LBS | HEART RATE: 97 BPM | HEIGHT: 60 IN

## 2018-07-09 DIAGNOSIS — R10.12 LUQ ABDOMINAL PAIN: ICD-10-CM

## 2018-07-09 DIAGNOSIS — R29.898 WEAKNESS OF RIGHT LOWER EXTREMITY: Primary | ICD-10-CM

## 2018-07-09 DIAGNOSIS — Z12.39 BREAST CANCER SCREENING: ICD-10-CM

## 2018-07-09 PROCEDURE — 99214 OFFICE O/P EST MOD 30 MIN: CPT | Mod: S$PBB,,, | Performed by: FAMILY MEDICINE

## 2018-07-09 PROCEDURE — 74019 RADEX ABDOMEN 2 VIEWS: CPT | Mod: 26,,, | Performed by: RADIOLOGY

## 2018-07-09 PROCEDURE — 99999 PR PBB SHADOW E&M-EST. PATIENT-LVL III: CPT | Mod: PBBFAC,,, | Performed by: FAMILY MEDICINE

## 2018-07-09 PROCEDURE — 74019 RADEX ABDOMEN 2 VIEWS: CPT | Mod: TC,FY,PO

## 2018-07-09 PROCEDURE — 99213 OFFICE O/P EST LOW 20 MIN: CPT | Mod: PBBFAC,PO,25 | Performed by: FAMILY MEDICINE

## 2018-07-09 RX ORDER — FLUTICASONE PROPIONATE 50 MCG
1 SPRAY, SUSPENSION (ML) NASAL DAILY PRN
COMMUNITY
Start: 2018-06-30

## 2018-07-09 NOTE — PROGRESS NOTES
Subjective:       Patient ID: Arianne Johnson is a 78 y.o. female.    Chief Complaint: Weakness (LEGS )    HPI     C/o bilat lower extremity weakness with right > left for the past x 3 months.  No associated low back pain.  Reports that her right foot drags while walking.     Reports intermittent discomfort in left lateral upper quad area x 5 weeks. Eating does not make the pain worse.  Reports that movement or standing up makes the discomfort worse.        Review of Systems      Review of Systems   Constitutional: Negative for fever and chills.   HENT: Negative for hearing loss and neck stiffness.    Eyes: Negative for redness and itching.   Respiratory: Negative for cough and choking.    Cardiovascular: Negative for chest pain and leg swelling.  Abdomen: Negative for abdominal pain and blood in stool.   Genitourinary: Negative for dysuria and flank pain.   Musculoskeletal: Negative for back pain  Neurological: Negative for light-headedness and headaches.   Hematological: Negative for adenopathy.   Psychiatric/Behavioral: Negative for behavioral problems.     Objective:      Physical Exam   Constitutional: She is oriented to person, place, and time. She appears well-developed.   HENT:   Head: Normocephalic and atraumatic.   Eyes: Conjunctivae are normal. Pupils are equal, round, and reactive to light.   Neck: Normal range of motion.   Cardiovascular: Normal rate and regular rhythm.    No murmur heard.  Pulmonary/Chest: Effort normal and breath sounds normal. She has no wheezes.   Abdominal: Soft. Bowel sounds are normal. She exhibits no distension. There is no tenderness.   Lymphadenopathy:     She has no cervical adenopathy.   Neurological: She is alert and oriented to person, place, and time. No cranial nerve deficit. Coordination normal.   Pertinent positive: 4/5 strength right dorsiflex       Assessment:       1. Weakness of right lower extremity    2. Breast cancer screening    3. LUQ abdominal pain        Plan:        Weakness of right lower extremity  -     EMG W/ ULTRASOUND AND NERVE CONDUCTION TEST 2 Extremities; Future  -     MRI Brain W WO Contrast; Future  -     Creatinine, serum; Future; Expected date: 07/09/2018    Breast cancer screening  -     Mammo Digital Screening Bilateral With CAD; Future; Expected date: 07/09/2018    LUQ abdominal pain  -     X-Ray Abdomen Flat And Erect; Future; Expected date: 07/09/2018              Plan:  See orders  Cont current meds      Medication List with Changes/Refills   Current Medications    ATORVASTATIN (LIPITOR) 40 MG TABLET    TAKE 1 TABLET(40 MG) BY MOUTH EVERY DAY    FLUTICASONE (FLONASE) 50 MCG/ACTUATION NASAL SPRAY        IBANDRONATE (BONIVA) 150 MG TABLET    TAKE 1 TABLET(150 MG) BY MOUTH EVERY 30 DAYS    MECLIZINE (ANTIVERT) 25 MG TABLET    Take 1 tablet (25 mg total) by mouth 3 (three) times daily as needed.    MONTELUKAST (SINGULAIR) 10 MG TABLET        OMEPRAZOLE (PRILOSEC) 40 MG CAPSULE    TAKE 1 CAPSULE(40 MG) BY MOUTH EVERY DAY    PROAIR HFA 90 MCG/ACTUATION INHALER       Discontinued Medications    BREO ELLIPTA 100-25 MCG/DOSE DISKUS INHALER

## 2018-07-15 ENCOUNTER — PATIENT MESSAGE (OUTPATIENT)
Dept: FAMILY MEDICINE | Facility: CLINIC | Age: 78
End: 2018-07-15

## 2018-07-24 ENCOUNTER — OFFICE VISIT (OUTPATIENT)
Dept: OTOLARYNGOLOGY | Facility: CLINIC | Age: 78
End: 2018-07-24
Payer: MEDICARE

## 2018-07-24 VITALS — HEIGHT: 60 IN | BODY MASS INDEX: 25.36 KG/M2 | WEIGHT: 129.19 LBS

## 2018-07-24 DIAGNOSIS — K21.9 LARYNGOPHARYNGEAL REFLUX (LPR): Primary | ICD-10-CM

## 2018-07-24 PROCEDURE — 99213 OFFICE O/P EST LOW 20 MIN: CPT | Mod: PBBFAC,PO,25 | Performed by: OTOLARYNGOLOGY

## 2018-07-24 PROCEDURE — 99999 PR PBB SHADOW E&M-EST. PATIENT-LVL III: CPT | Mod: PBBFAC,,, | Performed by: OTOLARYNGOLOGY

## 2018-07-24 PROCEDURE — 31575 DIAGNOSTIC LARYNGOSCOPY: CPT | Mod: S$PBB,,, | Performed by: OTOLARYNGOLOGY

## 2018-07-24 PROCEDURE — 31575 DIAGNOSTIC LARYNGOSCOPY: CPT | Mod: PBBFAC,PO | Performed by: OTOLARYNGOLOGY

## 2018-07-24 PROCEDURE — 99203 OFFICE O/P NEW LOW 30 MIN: CPT | Mod: 25,S$PBB,, | Performed by: OTOLARYNGOLOGY

## 2018-07-24 RX ORDER — FERROUS SULFATE, DRIED 160(50) MG
1 TABLET, EXTENDED RELEASE ORAL 2 TIMES DAILY WITH MEALS
COMMUNITY
End: 2018-01-01

## 2018-07-24 RX ORDER — MULTIVITAMIN
1 TABLET ORAL DAILY
COMMUNITY
End: 2018-01-01

## 2018-07-24 NOTE — PROGRESS NOTES
Subjective:       Patient ID: Arianne Johnson is a 78 y.o. female.    Chief Complaint: Raspy voice; Hoarse; and Constant throat clearing    Arianne is here for hoarseness. Symptoms have been present for at least 4 months but likely prior. Symptoms are intermittent, no exacerbating or alleviating factors. No pain. She has constant throat clearing. She feels her throat closes off sometimes. Symptoms seemed to begin around the time of inhaled medication (breo) but she is not sure. Inhaled lung medication did help with dyspnea. She gets occasional reflux symptoms. She takes omeprazole and Flonase. She reports good hydration overall.   She reports h/o CRS.      Previous surgery: T&A    History   Smoking Status    Never Smoker   Smokeless Tobacco    Never Used     History   Alcohol Use    Yes     Comment: occasionally          Review of Systems   Constitutional: Negative for activity change and appetite change.   Eyes: Negative for discharge.   Respiratory: Negative for difficulty breathing and wheezing   Cardiovascular: Negative for chest pain.   Gastrointestinal: Negative for abdominal distention and abdominal pain.   Endocrine: Negative for cold intolerance and heat intolerance.   Genitourinary: Negative for dysuria.   Musculoskeletal: Negative for gait problem and joint swelling.   Skin: Negative for color change and pallor.   Neurological: Negative for syncope and weakness.   Psychiatric/Behavioral: Negative for agitation and confusion.     Objective:        Constitutional:   She is oriented to person, place, and time. She appears well-developed and well-nourished. She appears alert. She is active. Normal speech.      Head:  Normocephalic and atraumatic. Head is without TMJ tenderness. No scars. Salivary glands normal.  Facial strength is normal.      Ears:    Right Ear: No drainage or swelling. No middle ear effusion.   Left Ear: No drainage or swelling.  No middle ear effusion.     Nose:  No mucosal edema, rhinorrhea  or sinus tenderness. No turbinate hypertrophy.      Mouth/Throat  Oropharynx clear and moist without lesions or asymmetry, normal uvula midline and mirror exam normal. Normal dentition. No uvula swelling, lacerations or trismus. No oropharyngeal exudate. Tonsillar erythema, tonsillar exudate.    Strong gag      Neck:  Full range of motion with neck supple and no adenopathy. Thyroid tenderness is present. No tracheal deviation, no edema, no erythema, normal range of motion, no stridor, no crepitus and no neck rigidity present. No thyroid mass present.     Cardiovascular:   Intact distal pulses and normal pulses.      Pulmonary/Chest:   Effort normal and breath sounds normal. No stridor.     Psychiatric:   Her speech is normal and behavior is normal. Her mood appears not anxious. Her affect is not labile.     Neurological:   She is alert and oriented to person, place, and time. No sensory deficit.     Skin:   No abrasions, lacerations, lesions, or rashes. No abrasion and no bruising noted.         Tests / Results:  Pre-procedure diagnosis: The encounter diagnosis was Laryngopharyngeal reflux (LPR).     Post-procedure diagnosis: same    Procedure: Flexible fiberoptic laryngoscopy    Surgeon: Alexsander Palmer MD    Anesthesia: 2% Lidocaine with 4% Oxymetazoline    Risks, benefits, and alternatives of the procedure were discussed with the patient, and the patient consented to the fiberoptic examination.  We applied a topical nasal decongestant and analgesic.  After adequate anesthesia was obtained, the flexible fiberoptic scope was passed through the nasal cavity. The entire pharynx (nasopharynx to hypopharynx) and the larynx were visualized. At the end of the examination, the scope was removed. The patient tolerated the procedure well with no complications.     Findings:  -     Laryngeal mucosa is normal  -     Post-cricoid region: moderate PCE  -     Lingual tonsils have no hypertrophy  -     Adenoids have no   hypertrophy  -     Right vocal fold: normal mobility     mass/lesion: none  -     Left vocal fold: normal mobility     mass/lesion: none  -     Other findings: laryngeal hypersensitivity, thickened mucous throughout larynx      Assessment:       1. Laryngopharyngeal reflux (LPR)          Plan:       Discussed thickened mucous and LPR contributing to symptoms. Possible mild ILS  Encourage continue hydration  Add Zantac at night. Continue PPI  OK to try inhaled steroid again for lung issue - if symptoms return, may need to come off.  FU 3 months

## 2018-07-24 NOTE — PATIENT INSTRUCTIONS
Recommend increasing hydration    I would be OK with you trying the inhaler again, I would be OK with this. You may want to try a different inhaler than Breo    Take the Zantac at night. Continue to Prilosec. I will plan to wean the Zantac after time when we can get the throat feeling better

## 2018-07-25 ENCOUNTER — HOSPITAL ENCOUNTER (OUTPATIENT)
Dept: RADIOLOGY | Facility: HOSPITAL | Age: 78
Discharge: HOME | End: 2018-07-25
Attending: FAMILY MEDICINE
Payer: MEDICARE

## 2018-07-25 DIAGNOSIS — R29.898 WEAKNESS OF RIGHT LOWER EXTREMITY: ICD-10-CM

## 2018-07-25 DIAGNOSIS — Z12.39 BREAST CANCER SCREENING: ICD-10-CM

## 2018-07-25 PROCEDURE — 77067 SCR MAMMO BI INCL CAD: CPT | Mod: 26,,, | Performed by: RADIOLOGY

## 2018-07-25 PROCEDURE — 70553 MRI BRAIN STEM W/O & W/DYE: CPT | Mod: TC,PO

## 2018-07-25 PROCEDURE — 25500020 PHARM REV CODE 255: Mod: PO | Performed by: FAMILY MEDICINE

## 2018-07-25 PROCEDURE — 77067 SCR MAMMO BI INCL CAD: CPT | Mod: TC,PO

## 2018-07-25 PROCEDURE — 70553 MRI BRAIN STEM W/O & W/DYE: CPT | Mod: 26,,, | Performed by: RADIOLOGY

## 2018-07-25 PROCEDURE — 77063 BREAST TOMOSYNTHESIS BI: CPT | Mod: 26,,, | Performed by: RADIOLOGY

## 2018-07-25 PROCEDURE — A9585 GADOBUTROL INJECTION: HCPCS | Mod: PO | Performed by: FAMILY MEDICINE

## 2018-07-25 RX ORDER — GADOBUTROL 604.72 MG/ML
5 INJECTION INTRAVENOUS
Status: COMPLETED | OUTPATIENT
Start: 2018-07-25 | End: 2018-07-25

## 2018-07-25 RX ADMIN — GADOBUTROL 5 ML: 604.72 INJECTION INTRAVENOUS at 01:07

## 2018-08-21 ENCOUNTER — OFFICE VISIT (OUTPATIENT)
Dept: FAMILY MEDICINE | Facility: CLINIC | Age: 78
End: 2018-08-21
Payer: MEDICARE

## 2018-08-21 VITALS
WEIGHT: 128.06 LBS | HEIGHT: 60 IN | DIASTOLIC BLOOD PRESSURE: 58 MMHG | SYSTOLIC BLOOD PRESSURE: 124 MMHG | OXYGEN SATURATION: 97 % | HEART RATE: 81 BPM | BODY MASS INDEX: 25.14 KG/M2 | RESPIRATION RATE: 18 BRPM

## 2018-08-21 DIAGNOSIS — J44.1 COPD EXACERBATION: ICD-10-CM

## 2018-08-21 DIAGNOSIS — M21.371 RIGHT FOOT DROP: Primary | ICD-10-CM

## 2018-08-21 PROCEDURE — 99214 OFFICE O/P EST MOD 30 MIN: CPT | Mod: S$PBB,,, | Performed by: FAMILY MEDICINE

## 2018-08-21 PROCEDURE — 99214 OFFICE O/P EST MOD 30 MIN: CPT | Mod: PBBFAC,PO | Performed by: FAMILY MEDICINE

## 2018-08-21 PROCEDURE — 99999 PR PBB SHADOW E&M-EST. PATIENT-LVL IV: CPT | Mod: PBBFAC,,, | Performed by: FAMILY MEDICINE

## 2018-08-21 RX ORDER — PREDNISONE 10 MG/1
TABLET ORAL
Qty: 20 TABLET | Refills: 0 | Status: SHIPPED | OUTPATIENT
Start: 2018-08-21 | End: 2018-09-18 | Stop reason: ALTCHOICE

## 2018-08-21 RX ORDER — TIOTROPIUM BROMIDE 18 UG/1
18 CAPSULE ORAL; RESPIRATORY (INHALATION) DAILY
Qty: 30 CAPSULE | Refills: 11 | Status: SHIPPED | OUTPATIENT
Start: 2018-08-21 | End: 2018-09-18

## 2018-08-21 RX ORDER — DOXYCYCLINE 100 MG/1
100 CAPSULE ORAL 2 TIMES DAILY
Qty: 20 CAPSULE | Refills: 0 | Status: SHIPPED | OUTPATIENT
Start: 2018-08-21 | End: 2018-09-18 | Stop reason: ALTCHOICE

## 2018-08-21 NOTE — PROGRESS NOTES
Subjective:       Patient ID: Arianne Johnson is a 78 y.o. female.    Chief Complaint: Weaness of right lower extremity (follow up)    HPI       Here for a f/u.    Saw patient approx 3-4 months ago for bilat lower extremity weakness, specifically below the knees, with right > left for the past x 3 months.  Occasional right low back pain.  Reports that her right foot drags while walking.  reports that her right leg is getting weaker.  Recent mri of brain was normal.     Hx of significant second hand smoke.  Coughs up clear phlegm daily with wheeze. More frequent productive cough over the past 2 weeks.     Review of Systems    Objective:      Physical Exam   Constitutional: She is oriented to person, place, and time. She appears well-developed.   HENT:   Head: Normocephalic and atraumatic.   Eyes: Conjunctivae are normal. Pupils are equal, round, and reactive to light.   Neck: Normal range of motion.   Cardiovascular: Normal rate and regular rhythm.   No murmur heard.  Pulmonary/Chest: Effort normal and breath sounds normal.   Musculoskeletal:   lumbar spine: pos tenderness of right lower paravert area.  Right slr to 90 degrees reproduces right lower back pain.  Left slr to 90 degrees reproduces no pain.      Lymphadenopathy:     She has no cervical adenopathy.   Neurological: She is alert and oriented to person, place, and time. She displays abnormal reflex. A sensory deficit is present. No cranial nerve deficit. Coordination abnormal.   Right foot dorsiflex: 4/5.  decreased reflex of right ankle. Dec light touch on medial plantar area of right foot       Assessment:       1. Right foot drop    2. COPD exacerbation        Plan:       Right foot drop  -     Ambulatory referral to Neurology  -     MRI Lumbar Spine Without Contrast; Future    COPD exacerbation    Other orders  -     doxycycline (MONODOX) 100 MG capsule; Take 1 capsule (100 mg total) by mouth 2 (two) times daily.  Dispense: 20 capsule; Refill: 0  -      predniSONE (DELTASONE) 10 MG tablet; Take 4 tabs daily for 2 days then Take 3 tabs daily for 2 days thenTake 2 tabs daily for 2 days then Take 1 tab daily for 2 days then stop  Dispense: 20 tablet; Refill: 0  -     tiotropium (SPIRIVA) 18 mcg inhalation capsule; Inhale 1 capsule (18 mcg total) into the lungs once daily.  Dispense: 30 capsule; Refill: 11      Plan:  See orders.   Start doxy, prednisone taper and spiriva for copd exacerbation.  Take proair prn   rx order for [employer] cane  dmv handicap form completed.       Medication List with Changes/Refills   New Medications    DOXYCYCLINE (MONODOX) 100 MG CAPSULE    Take 1 capsule (100 mg total) by mouth 2 (two) times daily.    PREDNISONE (DELTASONE) 10 MG TABLET    Take 4 tabs daily for 2 days then Take 3 tabs daily for 2 days thenTake 2 tabs daily for 2 days then Take 1 tab daily for 2 days then stop    TIOTROPIUM (SPIRIVA) 18 MCG INHALATION CAPSULE    Inhale 1 capsule (18 mcg total) into the lungs once daily.   Current Medications    ATORVASTATIN (LIPITOR) 40 MG TABLET    TAKE 1 TABLET(40 MG) BY MOUTH EVERY DAY    CALCIUM-VITAMIN D3 500 MG(1,250MG) -200 UNIT PER TABLET    Take 1 tablet by mouth 2 (two) times daily with meals.    FLUTICASONE (FLONASE) 50 MCG/ACTUATION NASAL SPRAY    daily as needed.     IBANDRONATE (BONIVA) 150 MG TABLET    TAKE 1 TABLET(150 MG) BY MOUTH EVERY 30 DAYS    MECLIZINE (ANTIVERT) 25 MG TABLET    Take 1 tablet (25 mg total) by mouth 3 (three) times daily as needed.    MONTELUKAST (SINGULAIR) 10 MG TABLET    every evening.     MULTIVITAMIN (THERAGRAN) PER TABLET    Take 1 tablet by mouth once daily.    OMEPRAZOLE (PRILOSEC) 40 MG CAPSULE    TAKE 1 CAPSULE(40 MG) BY MOUTH EVERY DAY    PROAIR HFA 90 MCG/ACTUATION INHALER    daily as needed.     RANITIDINE (ZANTAC) 300 MG TABLET    Take 1 tablet (300 mg total) by mouth every evening.

## 2018-09-04 ENCOUNTER — HOSPITAL ENCOUNTER (OUTPATIENT)
Dept: RADIOLOGY | Facility: HOSPITAL | Age: 78
Discharge: HOME | End: 2018-09-04
Attending: FAMILY MEDICINE
Payer: MEDICARE

## 2018-09-04 DIAGNOSIS — M21.371 RIGHT FOOT DROP: ICD-10-CM

## 2018-09-04 PROCEDURE — 72148 MRI LUMBAR SPINE W/O DYE: CPT | Mod: TC,PO

## 2018-09-04 PROCEDURE — 72148 MRI LUMBAR SPINE W/O DYE: CPT | Mod: 26,,, | Performed by: RADIOLOGY

## 2018-09-13 ENCOUNTER — OFFICE VISIT (OUTPATIENT)
Dept: PHYSICAL MEDICINE AND REHAB | Facility: CLINIC | Age: 78
End: 2018-09-13
Payer: MEDICARE

## 2018-09-13 DIAGNOSIS — M62.81 MUSCLE WEAKNESS: Primary | ICD-10-CM

## 2018-09-13 DIAGNOSIS — R29.898 WEAKNESS OF RIGHT LOWER EXTREMITY: ICD-10-CM

## 2018-09-13 PROCEDURE — 99211 OFF/OP EST MAY X REQ PHY/QHP: CPT | Mod: PBBFAC,PN | Performed by: PHYSICAL MEDICINE & REHABILITATION

## 2018-09-13 PROCEDURE — 95886 MUSC TEST DONE W/N TEST COMP: CPT | Mod: PBBFAC,PN | Performed by: PHYSICAL MEDICINE & REHABILITATION

## 2018-09-13 PROCEDURE — 99999 PR PBB SHADOW E&M-EST. PATIENT-LVL I: CPT | Mod: PBBFAC,,, | Performed by: PHYSICAL MEDICINE & REHABILITATION

## 2018-09-13 PROCEDURE — 95886 MUSC TEST DONE W/N TEST COMP: CPT | Mod: 26,S$PBB,, | Performed by: PHYSICAL MEDICINE & REHABILITATION

## 2018-09-13 PROCEDURE — 99499 UNLISTED E&M SERVICE: CPT | Mod: S$PBB,,, | Performed by: PHYSICAL MEDICINE & REHABILITATION

## 2018-09-13 PROCEDURE — 95909 NRV CNDJ TST 5-6 STUDIES: CPT | Mod: PBBFAC,PN | Performed by: PHYSICAL MEDICINE & REHABILITATION

## 2018-09-13 PROCEDURE — 95909 NRV CNDJ TST 5-6 STUDIES: CPT | Mod: 26,S$PBB,, | Performed by: PHYSICAL MEDICINE & REHABILITATION

## 2018-09-13 NOTE — PROGRESS NOTES
[facility]  [address] [place] [address]             Full Name: PERLA TOSCANO Gender: Female  Patient ID: [id number] YOB: 1940  History: Patient presents with BLE weakness and right foot drop for 6  months. Denies pain and paresthesias. No DM, lumbar surgery, or EtOH.      Visit Date: 9/13/2018 10:23  Age: 78 Years 2 Months Old  Examining Physician: DOMITILA  Referring Physician: VICKEY      Sensory NCS      Nerve / Sites Rec. Site Onset Lat Peak Lat NP Amp PP Amp Segments Distance Velocity     ms ms µV µV  cm m/s   L Sural - Ankle (Calf)      Calf Ankle 3.13 4.01 21.3 17.7 Calf - Ankle 14 45   R Sural - Ankle (Calf)      Calf Ankle 2.55 3.39 14.8 55.1 Calf - Ankle 14 55      2 Ankle 2.81 3.54 9.8 32.4          Motor NCS      Nerve / Sites Muscle Latency Amplitude Amp % Duration Segments Distance Lat Diff Velocity     ms mV % ms  cm ms m/s   L Peroneal - EDB      Ankle EDB 4.84 1.4 100 6.30 Ankle - EDB 8        Fib head EDB 10.42 2.4 166 7.45 Fib head - Ankle 24 5.57 43      Pop fossa EDB 12.24 2.7 185 7.92 Pop fossa - Fib head 9 1.82 49   R Peroneal - EDB      Ankle EDB 6.41 0.7 100 5.00 Ankle - EDB 8        Fib head EDB 15.42 0.8 121 4.64 Fib head - Ankle 24 9.01 27      Pop fossa EDB 17.08 0.8 118 4.64 Pop fossa - Fib head 11 1.67 66   L Tibial - AH      Ankle AH 5.94 5.0 100 5.26 Ankle - AH 8        Pop fossa AH 13.59 4.2 83.2 5.47 Pop fossa - Ankle 33 7.66 43   R Tibial - AH      Ankle AH 6.25 2.0 100 5.36 Ankle - AH 8        Pop fossa AH 14.32 1.6 78.2  Pop fossa - Ankle 33 8.07 41       EMG         EMG Summary Table     Spontaneous MUAP Recruitment   Muscle IA Fib PSW Fasc H.F. Amp Dur. PPP Pattern   R. Vastus medialis 1+ 1+ 1+ None None N N N Reduced   R. Tibialis anterior 2+ 2+ 2+ None None N N N Reduced   R. Peroneus longus 2+ 3+ 2+ None None N N N Reduced   R. Gastrocnemius (Medial head) 2+ 3+ 2+ None None N N N Reduced   R. Gluteus medius 2+ None 2+ None None N N N N   R.  Gluteus lavelle 1+ None 1+ None None       R. Lumbar paraspinals 2+ 2+ 2+ None None       L. Vastus medialis 1+ 1+ 1+ None None N N N N   L. Tibialis anterior 2+ None 2+ None None N N N Reduced   L. Peroneus longus 2+ 2+ 2+ None None N N N Reduced   L. Gastrocnemius (Medial head) 2+ 1+ 2+ None None N N N Reduced   L. Gluteus medius 1+ None 1+ None None N N N N   L. Gluteus lavelle N None None None None       L. Lumbar paraspinals 1+ None 1+ None None           Summary    The motor conduction test was performed on 4 nerve(s). The results were normal in 1 nerve(s): L Tibial - AH. Results outside the specified normal range were found in 3 nerve(s), as follows:   In the L Peroneal - EDB study  o the peak amplitude result was reduced for Ankle stimulation   In the R Peroneal - EDB study  o the take off latency result was increased for Ankle stimulation  o the peak amplitude result was reduced for Ankle stimulation  o the take off velocity result was reduced for Fib head - Ankle segment   In the R Tibial - AH study  o the take off latency result was increased for Ankle stimulation  o the peak amplitude result was reduced for Ankle stimulation    The sensory conduction test was normal in all 2 of the tested nerves: L Sural - Ankle (Calf), R Sural - Ankle (Calf).    The needle EMG examination was performed in 14 muscles. It was normal in 1 muscle(s): L. Gluteus lavelle. The study was abnormal in 13 muscle(s), with the following distribution:   Abnormal spontaneous/insertional activity was found in R. Vastus medialis, R. Tibialis anterior, R. Peroneus longus, R. Gastrocnemius (Medial head), R. Gluteus medius, R. Gluteus lavelle, R. Lumbar paraspinals, L. Vastus medialis, L. Tibialis anterior, L. Peroneus longus, L. Gastrocnemius (Medial head), L. Gluteus medius, L. Lumbar paraspinals.   Abnormal interference pattern was found in R. Vastus medialis, R. Tibialis anterior, R. Peroneus longus, R. Gastrocnemius (Medial  head), L. Tibialis anterior, L. Peroneus longus, L. Gastrocnemius (Medial head).      Electrodiagnostic Impression:  1. Significant findings were seen on today's electrodiagnostic test.  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  Motor nerve conduction studies of the bilateral lower extremities also support axonal denervation.  Differential diagnoses includes multilevel bilateral lumbosacral radiculopathy, myopathy, bilateral lumbosacral plexopathy, or other diffuse neurologic disease process.  2. There was insufficient electrodiagnostic evidence for diagnoses of peripheral polyneuropathy or focal mononeuropathy of the bilateral lower extremities.    Recommendations:  I reviewed her recent imaging consisting of MRIs of the lumbar spine and brain.  The studies relatively benign and cannot explain today's electrodiagnostic findings.  Further workup is required.  Brief neurologic exam today revealed bilateral Hutchison's reflexes.  At this point, we will have her return for EMG of the bilateral upper extremities.  I will also order a serum CK level.  We may consider neurology referral pending today's results.    Thank you very much for the referral. Please call if you have any questions regarding this study or the report.       -------------------------------  Kevin Song M.D.

## 2018-09-13 NOTE — LETTER
September 13, 2018      Chiki Paniagua MD  [address] [facility] [address] [place] [address]           [facility]  [address] [facility] [place] [address]  Phone: [phone]  Fax: [phone]          Patient: Arianne Johnson   MR Number: [id number]   YOB: 1940   Date of Visit: 9/13/2018       Dear Dr. Chiki Paniagua:    Thank you for referring Arianne Johnson to me for evaluation. Attached you will find relevant portions of my assessment and plan of care.    If you have questions, please do not hesitate to call me. I look forward to following Arianne Johnson along with you.    Sincerely,    Kevin Song MD    Enclosure  CC:  No Recipients    If you would like to receive this communication electronically, please contact [email or web address] or [phone] to request more information on [employer] access.    For providers and/or their staff who would like to refer a patient to [facility], please contact us through our one-stop-shop provider referral line, [facility], at [phone].    If you feel you have received this communication in error or would no longer like to receive these types of communications, please e-mail [email or web address]

## 2018-09-17 ENCOUNTER — PATIENT MESSAGE (OUTPATIENT)
Dept: PHYSICAL MEDICINE AND REHAB | Facility: CLINIC | Age: 78
End: 2018-09-17

## 2018-09-18 ENCOUNTER — OFFICE VISIT (OUTPATIENT)
Dept: FAMILY MEDICINE | Facility: CLINIC | Age: 78
End: 2018-09-18
Payer: MEDICARE

## 2018-09-18 ENCOUNTER — LAB VISIT (OUTPATIENT)
Dept: LAB | Facility: HOSPITAL | Age: 78
End: 2018-09-18
Attending: PHYSICAL MEDICINE & REHABILITATION
Payer: MEDICARE

## 2018-09-18 VITALS
HEIGHT: 60 IN | WEIGHT: 126.75 LBS | DIASTOLIC BLOOD PRESSURE: 52 MMHG | BODY MASS INDEX: 24.88 KG/M2 | HEART RATE: 106 BPM | OXYGEN SATURATION: 94 % | SYSTOLIC BLOOD PRESSURE: 102 MMHG

## 2018-09-18 DIAGNOSIS — R29.898 WEAKNESS OF BOTH HANDS: ICD-10-CM

## 2018-09-18 DIAGNOSIS — R29.898 WEAKNESS OF LOWER EXTREMITY, UNSPECIFIED LATERALITY: Primary | ICD-10-CM

## 2018-09-18 DIAGNOSIS — M62.81 MUSCLE WEAKNESS: ICD-10-CM

## 2018-09-18 DIAGNOSIS — J44.9 CHRONIC OBSTRUCTIVE PULMONARY DISEASE, UNSPECIFIED COPD TYPE: ICD-10-CM

## 2018-09-18 LAB — CK SERPL-CCNC: 147 U/L

## 2018-09-18 PROCEDURE — 99999 PR PBB SHADOW E&M-EST. PATIENT-LVL III: CPT | Mod: PBBFAC,,, | Performed by: FAMILY MEDICINE

## 2018-09-18 PROCEDURE — 82550 ASSAY OF CK (CPK): CPT

## 2018-09-18 PROCEDURE — 99214 OFFICE O/P EST MOD 30 MIN: CPT | Mod: S$PBB,,, | Performed by: FAMILY MEDICINE

## 2018-09-18 PROCEDURE — 99213 OFFICE O/P EST LOW 20 MIN: CPT | Mod: PBBFAC,PO | Performed by: FAMILY MEDICINE

## 2018-09-18 PROCEDURE — 82608 B-12 BINDING CAPACITY: CPT

## 2018-09-18 PROCEDURE — 36415 COLL VENOUS BLD VENIPUNCTURE: CPT | Mod: PO

## 2018-09-18 NOTE — PROGRESS NOTES
Subjective:       Patient ID: Arianne Johnson is a 78 y.o. female.    Chief Complaint: Results    HPI     Here for a f/u.     For the past 5 months, reports bilat lower extremity weakness, with right > left.   Reports that her right foot drags while walking.  reports that her both legs are getting weaker.   mri of brain in 7/2018 was normal.  Mri of Lumbar spine showed mild multilevel degenerative change in the lumbar spine without spinal stenosis or significant foraminol stenosis.     Also, pt c/o decrease in  strength for the past 2 months.      Had a nerve conduction study of bilat lower extremities on 9/13/18.  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  Motor nerve conduction studies of the bilateral lower extremities also support axonal denervation.     In lieu of her nerve conduction of lower ext, planning to get bilat upper ext nerve conduction.  Will see her neurologist next month.    Copd stable. No worsening cough or wheeze.     Review of Systems      Review of Systems   Constitutional: Negative for fever and chills.   HENT: Negative for hearing loss and neck stiffness.    Eyes: Negative for redness and itching.   Respiratory: Negative for cough and choking.    Cardiovascular: Negative for chest pain and leg swelling.  Abdomen: Negative for abdominal pain and blood in stool.   Genitourinary: Negative for dysuria and flank pain.   Musculoskeletal: Negative for back pain and gait problem.   Neurological: Negative for light-headedness and headaches.   Hematological: Negative for adenopathy.   Psychiatric/Behavioral: Negative for behavioral problems.     Objective:      Physical Exam   Constitutional: She is oriented to person, place, and time.   Neurological: She is alert and oriented to person, place, and time. No cranial nerve deficit.   bilateral foot dorsiflex: 4/5.  decreased reflex of bilat ankles.     Bilateral upper ext  hand strength 4/5. Normal reflexes of upper ext       Assessment:       1. Weakness of lower extremity, unspecified laterality    2. Weakness of both hands    3. Chronic obstructive pulmonary disease, unspecified COPD type        Plan:       Weakness of lower extremity, unspecified laterality    Weakness of both hands    Chronic obstructive pulmonary disease, unspecified COPD type          Plan:  Keep appt for nerve cond bilat upper ext and neurology appt

## 2018-09-22 LAB — VIT B12 USB SERPL-MCNC: 1119 PG/ML (ref 800–2600)

## 2018-09-25 ENCOUNTER — OFFICE VISIT (OUTPATIENT)
Dept: PHYSICAL MEDICINE AND REHAB | Facility: CLINIC | Age: 78
End: 2018-09-25
Payer: MEDICARE

## 2018-09-25 DIAGNOSIS — M62.81 MUSCLE WEAKNESS: ICD-10-CM

## 2018-09-25 PROCEDURE — 95886 MUSC TEST DONE W/N TEST COMP: CPT | Mod: PBBFAC,PN | Performed by: PHYSICAL MEDICINE & REHABILITATION

## 2018-09-25 PROCEDURE — 95867 NDL EMG CRANIAL NRV MUSC UNI: CPT | Mod: PBBFAC,PN | Performed by: PHYSICAL MEDICINE & REHABILITATION

## 2018-09-25 PROCEDURE — 95869 NDL EMG THRC PARASPINAL MUSC: CPT | Mod: PBBFAC,PN | Performed by: PHYSICAL MEDICINE & REHABILITATION

## 2018-09-25 PROCEDURE — 95886 MUSC TEST DONE W/N TEST COMP: CPT | Mod: 26,S$PBB,, | Performed by: PHYSICAL MEDICINE & REHABILITATION

## 2018-09-25 PROCEDURE — 99999 PR PBB SHADOW E&M-EST. PATIENT-LVL I: CPT | Mod: PBBFAC,,, | Performed by: PHYSICAL MEDICINE & REHABILITATION

## 2018-09-25 PROCEDURE — 99211 OFF/OP EST MAY X REQ PHY/QHP: CPT | Mod: PBBFAC,PN,25 | Performed by: PHYSICAL MEDICINE & REHABILITATION

## 2018-09-25 PROCEDURE — 95910 NRV CNDJ TEST 7-8 STUDIES: CPT | Mod: 26,S$PBB,, | Performed by: PHYSICAL MEDICINE & REHABILITATION

## 2018-09-25 PROCEDURE — 99499 UNLISTED E&M SERVICE: CPT | Mod: S$PBB,,, | Performed by: PHYSICAL MEDICINE & REHABILITATION

## 2018-09-25 PROCEDURE — 95910 NRV CNDJ TEST 7-8 STUDIES: CPT | Mod: PBBFAC,PN | Performed by: PHYSICAL MEDICINE & REHABILITATION

## 2018-09-25 NOTE — PROGRESS NOTES
[facility]  [address] [facility] [place] [address]             Full Name: PERLA TOSCANO Gender: Female  Patient ID: [id number] YOB: 1940  History: Patient with bilateral hand weakness for one month, difficulty opening things and buttoning buttons. Right worse than left, right-handed. No numbness, tingling or pain in the hands. Some neck pain without radiation. No DM or EtOH.      Visit Date: 9/25/2018 10:40  Age: 78 Years 3 Months Old  Examining Physician: Nohemi      Sensory NCS      Nerve / Sites Rec. Site Onset Lat Peak Lat NP Amp PP Amp Segments Distance Velocity     ms ms µV µV  cm m/s   L Median - Digit III (Antidromic)      Wrist Dig III 2.66 3.44 26.4 57.6 Wrist - Dig III 14 53      Mid palm Dig III 1.15 1.67 55.8 66.3 Mid palm - Dig III 7 61   R Median - Digit III (Antidromic)      Wrist Dig III 2.66 3.54 24.3 52.7 Wrist - Dig III 14 53      Mid palm Dig III 1.09 1.61 34.4 65.3 Mid palm - Dig III 7 64   L Ulnar - Digit V (Antidromic)      Wrist Dig V 2.40 3.28 36.8 55.8 Wrist - Dig V 14 58   R Ulnar - Digit V (Antidromic)      Wrist Dig V 2.29 3.18 29.7 42.7 Wrist - Dig V 14 61       Motor NCS      Nerve / Sites Muscle Latency Amplitude Amp % Duration Segments Distance Lat Diff Velocity     ms mV % ms  cm ms m/s   R Median - APB      Wrist APB 4.95 5.5 100 6.67 Wrist - APB 8        Elbow APB 9.01 4.3 79.5 7.19 Elbow - Wrist 20 4.06 49   L Median - APB      Wrist APB 4.43 3.9 100 4.95 Wrist - APB 8        Elbow APB 8.39 3.1 78 5.94 Elbow - Wrist 20 3.96 51   R Ulnar - ADM      Wrist ADM 3.23 8.3 100 5.26 Wrist - ADM 8        B.Elbow ADM 5.63 8.2 98.3 5.94 B.Elbow - Wrist 15 2.40 63      A.Elbow ADM 7.45 7.3 87.8 5.52 A.Elbow - B.Elbow 10 1.82 55   L Ulnar - ADM      Wrist ADM 3.13 5.7 100 5.78 Wrist - ADM 8        B.Elbow ADM 5.05 5.4 93.7 5.94 B.Elbow - Wrist 13 1.93 67      A.Elbow ADM 6.93 4.6 80.6 5.83 A.Elbow - B.Elbow 10 1.88 53       EMG         EMG Summary Table     Spontaneous  MUAP Recruitment   Muscle IA Fib PSW Fasc H.F. Amp Dur. PPP Pattern   L. Deltoid 1+ None 1+ None None N 1+ N N   R. Deltoid 2+ 1+ 2+ None None N N N N   R. Biceps brachii N None None None None N N N N   R. Triceps brachii N None None None None N N N N   R. Pronator teres 2+ None 2+ None None N 1+ N N   R. First dorsal interosseous 2+ 1+ 2+ None None N N 1+ N   R. Abductor pollicis brevis 2+ 1+ 2+ None None N N 1+ N   R. Cervical paraspinals 2+ 2+ 1+ None None       L. Biceps brachii N None None None None N N N N   L. Triceps brachii N None None None None N N N N   L. Pronator teres 1+ 1+ 1+ None None N N 1+ N   L. First dorsal interosseous 1+ 1+ 1+ None None N 1+ N N   L. Cervical paraspinals N None None None None       L. Thoracic paraspinals 2+ 2+ 2+ None None       L. Tongue 1+CRD None None None None           Summary    The motor conduction test was performed on 4 nerve(s). The results were normal in 2 nerve(s): R Ulnar - ADM, L Ulnar - ADM. Results outside the specified normal range were found in 2 nerve(s), as follows:   In the R Median - APB study  o the take off latency result was increased for Wrist stimulation   In the L Median - APB study  o the take off latency result was increased for Wrist stimulation  o the peak amplitude result was reduced for Wrist stimulation    The sensory conduction test was performed on 4 nerve(s). The results were normal in 4 nerve(s): L Median - Digit III (Antidromic), R Median - Digit III (Antidromic), L Ulnar - Digit V (Antidromic), R Ulnar - Digit V (Antidromic).     The needle EMG examination was performed in 15 muscles. It was normal in 5 muscle(s): R. Biceps brachii, R. Triceps brachii, L. Biceps brachii, L. Triceps brachii, L. Cervical paraspinals. The study was abnormal in 10 muscle(s), with the following distribution:   Abnormal spontaneous/insertional activity was found in L. Deltoid, R. Deltoid, R. Pronator teres, R. First dorsal interosseous, R. Abductor  pollicis brevis, R. Cervical paraspinals, L. Pronator teres, L. First dorsal interosseous, L. Thoracic paraspinals, L. Tongue.   The MUP waveform abnormality was found in L. Deltoid, R. Pronator teres, R. First dorsal interosseous, R. Abductor pollicis brevis, L. Pronator teres, L. First dorsal interosseous.      Electrodiagnostic Impression:  Similar to recent electrodiagnostic testing of the bilateral lower extremities on 9/13/18, today's test results yielded significant findings.  There were signs of active axonal denervation seen in nearly all muscles sampled throughout the bilateral upper extremities.  Additionally, there were similar findings seen in the thoracic paraspinal musculature, as well as complex repetitive discharges seen in the tongue.  Overall, this appears to be a picture of a systemic neurologic disease process.    Plan:  At this point, I encouraged her to keep her appointment with Neurology, Dr. Elaine, scheduled for 10/08/2018.    Thank you very much for the referral. Please call if you have any questions regarding this study or the report.       -------------------------------  Kevin Song M.D.

## 2018-10-08 ENCOUNTER — OFFICE VISIT (OUTPATIENT)
Dept: NEUROLOGY | Facility: CLINIC | Age: 78
End: 2018-10-08
Payer: MEDICARE

## 2018-10-08 ENCOUNTER — LAB VISIT (OUTPATIENT)
Dept: LAB | Facility: HOSPITAL | Age: 78
End: 2018-10-08
Attending: PSYCHIATRY & NEUROLOGY
Payer: MEDICARE

## 2018-10-08 VITALS
SYSTOLIC BLOOD PRESSURE: 134 MMHG | HEIGHT: 60 IN | HEART RATE: 91 BPM | WEIGHT: 127 LBS | BODY MASS INDEX: 24.94 KG/M2 | RESPIRATION RATE: 18 BRPM | DIASTOLIC BLOOD PRESSURE: 61 MMHG

## 2018-10-08 DIAGNOSIS — R29.2 HYPERREFLEXIA: ICD-10-CM

## 2018-10-08 DIAGNOSIS — R53.1 WEAKNESS: ICD-10-CM

## 2018-10-08 DIAGNOSIS — R94.131 ABNORMAL EMG: ICD-10-CM

## 2018-10-08 DIAGNOSIS — M21.371 RIGHT FOOT DROP: ICD-10-CM

## 2018-10-08 DIAGNOSIS — R94.131 ABNORMAL EMG: Primary | ICD-10-CM

## 2018-10-08 LAB — ERYTHROCYTE [SEDIMENTATION RATE] IN BLOOD BY WESTERGREN METHOD: 25 MM/HR

## 2018-10-08 PROCEDURE — 86334 IMMUNOFIX E-PHORESIS SERUM: CPT

## 2018-10-08 PROCEDURE — 84165 PROTEIN E-PHORESIS SERUM: CPT

## 2018-10-08 PROCEDURE — 85651 RBC SED RATE NONAUTOMATED: CPT | Mod: PO

## 2018-10-08 PROCEDURE — 99999 PR PBB SHADOW E&M-EST. PATIENT-LVL III: CPT | Mod: PBBFAC,,, | Performed by: PSYCHIATRY & NEUROLOGY

## 2018-10-08 PROCEDURE — 86334 IMMUNOFIX E-PHORESIS SERUM: CPT | Mod: 26,,, | Performed by: PATHOLOGY

## 2018-10-08 PROCEDURE — 99204 OFFICE O/P NEW MOD 45 MIN: CPT | Mod: S$PBB,,, | Performed by: PSYCHIATRY & NEUROLOGY

## 2018-10-08 PROCEDURE — 99213 OFFICE O/P EST LOW 20 MIN: CPT | Mod: PBBFAC,PN | Performed by: PSYCHIATRY & NEUROLOGY

## 2018-10-08 PROCEDURE — 36415 COLL VENOUS BLD VENIPUNCTURE: CPT | Mod: PO

## 2018-10-08 PROCEDURE — 84165 PROTEIN E-PHORESIS SERUM: CPT | Mod: 26,,, | Performed by: PATHOLOGY

## 2018-10-08 RX ORDER — ALBUTEROL SULFATE 0.83 MG/ML
SOLUTION RESPIRATORY (INHALATION)
Refills: 0 | COMMUNITY
Start: 2018-09-21 | End: 2018-01-01

## 2018-10-08 NOTE — PROGRESS NOTES
"NEUROLOGY  Outpatient CONSULT    [facility]  [address], [place] [address]  [phone] (office) / [phone] (fax)    Patient Name:  Arianne Johnson  :  1940  MR #:  [id number]  Acct #:  [id number]    Date of Neurology Consult: 10/08/2018  Name of Neurologist: Savannah Elaine D.O, ABPN, AOBNP, ABEM    Other Physicians:  Chiki Paniagua MD (Primary Care Physician); Chiki Paniagua MD (Referring)      Chief Complaint: Right foot drop (consult)      History of Present Illness (HPI):  Arianne Johnson is a 78 y.o. female referred by her primary physician for consultation regarding foot drop.  She is accompanied by her grand-daughter.    Her issues began in March of this year.  She states that she noticed that her right foot was slapping instead of stepping.  Her son was noticing that she was having trouble walking straight.  She was apparently stooped over when walking.  She feels this seemed to have come on over night.  She feels that her legs are getting progressively weaker.  She has no numbness, pins and needles or abnormal sensations.  She will feel "funny" sometimes in the right leg as if it is going to sleep.  She is slow to get in and out of a car.  She is using a cane to walk.  She is slow on stairs.  She has fallen.  She feels that her  is getting weaker.  She has trouble opening bottles and squeezing toothpaste.  She has trouble with buttons.  This started about a month ago.      She has some trouble with swallowing.  She was seen by a lung doctor in March.  She was put on Breo.  It helped her shortness of breath, but since then she has noticed that her voice is hoarse and she will have trouble swallowing.      She was seen by her primary physician first.  She had an xray of her back which was unrevealing.  She was seen back in early September and she was unable to walk tandem.  She was referred to neurology.  In the meantime, she was referred for an EMG of the " lower extremities.  She was seen by Dr. Song and was told the nerves were dying based on her EMG.  He brought her back for a study of the arms as well.  Her primary also ordered an MRI of her head.    She has no family history of anything like this.  Her father had alzheimer's dementia.  Her father  at 93yo.   She denies any memory trouble.  Her grand-daughter agrees.    She denies any illness or injury prior to the onset. She denies any pain associated with this.    Treatment to date:    Prednisone - for wheezing in her chest    Review of Systems:   General: Weight gain: No, Weight Loss: Yes, Fatigue: No,   Fever: No, Chills: No, Night Sweats: No, Insomnia: No, Excessive sleeping: No   Respiratory:  Cough: Yes, Shortness of Breath: Yes,   Wheezing: No, Excessive Snoring: No, Coughing up blood: No  Endocrine: Heat Intolerance: No, Cold Intolerance: No,   Excessive Thirst: Yes, Excessive Hunger: No,   Eyes:  Blurred Vision: No, Double Vision: No,   Light Sensitivity: No, Eye pain: No  Musculoskeletal: Muscle Aches/Pain: No, Joint Pain/Swelling: No, Muscle Cramps: Yes, Muscle Weakness: No, Neck Pain: Yes, Back Pain: No   Neurological: Difficulty Walking/Falls: Yes, Headache Migraine: No, Dizziness/Vertigo: Yes, Fainting: No, Difficulty with Speech: No, Weakness: Yes, Tingling/Numbness: No, Tremors: No, Memory Problems: No, Seizures: No, Difficulty Swallowing: Yes, Altered Taste: No.  Cardiovascular: Chest Pain: No, Shortness of Breath: Yes,   Palpitations: No,  Gastrointestinal: Nausea/Vomiting: No, Constipation: Yes, Diarrhea: No, Bloody Stools: No   Psych/Cog:  Depression: No, Anxiety: No, Hallucinations: No, Problems Concentrating: No  : Frequent Urination: No, Incontinence: No, Blood of Urine: No, Urinary Infections: No, Changes in Sex Drive: No   ENT:Hearing Loss: No, Earache: No, Ringing in Ears: No,   Facial Pain: No, Chronic Congestion: No   Immune: Seasonal Allergies: Yes, Hives and/or Rashes:  No  The remainder of the review of twelve body systems was reviewed and normal.    Past Medical, Surgical, Family & Social History:   Past Medical History:   Diagnosis Date    Abnormal Pap smear     repeats were normal    Breast disorder     benign left breast biopsy    GERD (gastroesophageal reflux disease)     Hay fever     Hyperlipidemia     Seizures 02/1960    after giving birth, none since     Past Surgical History:   Procedure Laterality Date    APPENDECTOMY      BREAST BIOPSY Left     benign    COLONOSCOPY N/A 2/21/2017    Procedure: COLONOSCOPY;  Surgeon: Juni Devi MD;  Location: [facility] END[facility];  Service: Endoscopy;  Laterality: N/A;    COLONOSCOPY N/A 2/21/2017    Performed by Juni Devi MD at [facility] ENDO    DILATION AND CURETTAGE OF UTERUS      TONSILLECTOMY, ADENOIDECTOMY      TOTAL ABDOMINAL HYSTERECTOMY       Family History   Problem Relation Age of Onset    Breast cancer Maternal Aunt     Cancer Daughter         adenocarcinoma of lung with mets to brain    Cancer Grandchild         epitheloid angiosarcoma    Ovarian cancer Neg Hx      Alcohol use:  reports that she drinks alcohol.   (Of note, 0.6 oz = 1 beer or 6 oz = 10 beers).  Tobacco use:  reports that  has never smoked. she has never used smokeless tobacco.  Street drug use:  reports that she does not use drugs.  Allergies: Codeine; Demerol [meperidine]; and Sulfa (sulfonamide antibiotics).    Home Medications:     Current Outpatient Medications:     albuterol (PROVENTIL) 2.5 mg /3 mL (0.083 %) nebulizer solution, USE 1 AMPULE PER NEBULIZER 3 TIMES DAILY, Disp: , Rfl: 0    atorvastatin (LIPITOR) 40 MG tablet, TAKE 1 TABLET(40 MG) BY MOUTH EVERY DAY, Disp: 90 tablet, Rfl: 3    calcium-vitamin D3 500 mg(1,250mg) -200 unit per tablet, Take 1 tablet by mouth 2 (two) times daily with meals., Disp: , Rfl:     fluticasone (FLONASE) 50 mcg/actuation nasal spray, daily as needed. , Disp: , Rfl:     ibandronate (BONIVA) 150  mg tablet, TAKE 1 TABLET(150 MG) BY MOUTH EVERY 30 DAYS, Disp: 1 tablet, Rfl: 11    meclizine (ANTIVERT) 25 mg tablet, Take 1 tablet (25 mg total) by mouth 3 (three) times daily as needed., Disp: 30 tablet, Rfl: 3    montelukast (SINGULAIR) 10 mg tablet, every evening. , Disp: , Rfl:     multivitamin (THERAGRAN) per tablet, Take 1 tablet by mouth once daily., Disp: , Rfl:     omeprazole (PRILOSEC) 40 MG capsule, TAKE 1 CAPSULE(40 MG) BY MOUTH EVERY DAY, Disp: 30 capsule, Rfl: 11    PROAIR HFA 90 mcg/actuation inhaler, daily as needed. , Disp: , Rfl:     ranitidine (ZANTAC) 300 MG tablet, Take 1 tablet (300 mg total) by mouth every evening., Disp: 30 tablet, Rfl: 6    Physical Examination:  /61   Pulse 91   Resp 18   Ht 5' (1.524 m)   Wt 57.6 kg (127 lb)   BMI 24.80 kg/m²     GENERAL:  General appearance: Well, non-toxic appearing.  No apparent distress.  Fundi exam: normal.  Neck: supple.  Carotid auscultation: normal.  Heart auscultation: normal.  Peripheral pulses: normal.  Extremities: normal.    MENTAL STATUS:  Alertness, attention span & concentration: normal.  Language: normal.  Orientation to self, place & time:  normal.  Memory, recent & remote: normal.  Fund of knowledge: normal.    SPEECH:  Hoarse and fluent.  Follows complex commands.    CRANIAL NERVES:  Cranial Nerves II-XII were examined.  II - Visual fields: normal.  III, IV, VI: PERRL, EOMI, No ptosis, No nystagmus.  V - Facial sensation: normal.  VII - Face symmetry & mobility: normal.  VIII - Hearing: normal.  IX, X - Palate: elevates on left more than right.  XI - Shoulder shrug: normal.  XII - Tongue protrusion: normal.    GROSS MOTOR:  Gait & station: slow, with normal width base, uses cane, no toe drag, steppage, or circumduction.  No scissoring.  Tone: normal.  Abnormal movements: none.  Finger-nose & Heel-knee-shin: normal.  Rapid alternating movements & drift: normal.  Romberg: absent    MUSCLE STRENGTH:     Frequently  induced cramps.    Fascics Atrophy RIGHT    LEFT Atrophy Fascics     5 Neck Ext. 5       5 Neck Flex 5       5 Deltoids 5       5 Sh.Ext.Rot. 5       5 Sh.Int.Rot. 5       5 Biceps 5       5 Triceps 5       5 Forearm.Pr. 5       5 Wrist Ext. 5       5 Wrist Flex 5       5 Finger Ext. 5       5 Finger Flex 5       5 FPL 5       5 Inteross. 5                         5- Iliopsoas 5-       5 Hip Abduct 5       5 Hip Adduct 5       5- Quads 5       5 Hams 5       4 Dorsiflex 5       5 Plantar Flex 5       5 Ankle Darius 5       5 Ankle Invert 5       4 Toe Ext. 5       5 Toe Flex 5                         REFLEXES:    RIGHT Reflex   LEFT   2+ Biceps 2+   2+ Brachiorad. 2+   2+ Triceps 2+   ++ Pectoralis ++    Jaw Jerk    - Hutchison's -        2+ Patellar 2+   2+ Ankle 2+   - Suprapatellar -             Down PLANTAR Down             SENSORY:  Sharp touch: decreased sensation in hands and arms, more on the right than the left.  Decreased sensation in the lateral lower legs on both sides.  No stocking pattern.  Vibration: Normal throughout.    Diagnostic Data Reviewed:   Records from her referring provider were reviewed.  She had been seen 3-4 months prior for bilateral lower extremity weakness.  Weakness was predominantly below the knees and worse on the right.  She had noted that she was dragging her foot while she was walking.  Her right leg seemed to be getting weaker in general.  She had had an MRI of the brain recently which was reportedly normal.      Component      Latest Ref Rng & Units 9/18/2018   CPK      20 - 180 U/L 147   Vitamin B12 Bind Capacity      800 - 2600 pg/mL 1119       EMG 9/25/18  Similar to recent electrodiagnostic testing of the bilateral lower extremities on 9/13/18, today's test results yielded significant findings.  There were signs of active axonal denervation seen in nearly all muscles sampled throughout the bilateral upper extremities.  Additionally, there were similar findings seen in the  thoracic paraspinal musculature, as well as complex repetitive discharges seen in the tongue.  Overall, this appears to be a picture of a systemic neurologic disease process.      EMG 9/13/18  I reviewed her recent imaging consisting of MRIs of the lumbar spine and brain.  The studies relatively benign and cannot explain today's electrodiagnostic findings.  Further workup is required.  Brief neurologic exam today revealed bilateral Hutchison's reflexes.  At this point, we will have her return for EMG of the bilateral upper extremities.  I will also order a serum CK level.  We may consider neurology referral pending today's results.    MRI brain 7/25/18:  EXAMINATION:  MRI BRAIN W WO CONTRAST    CLINICAL HISTORY:  right lower ext weakness;.  Other symptoms and signs involving the musculoskeletal system    TECHNIQUE:  Multiplanar multisequence MR imaging of the brain was performed before and after the administration of 5 mL Gadavist intravenous contrast.  Diffusion-weighted imaging was performed.  ADC map was generated.    COMPARISON:  None.    FINDINGS:  Intracranial compartment:    There is no acute intracranial abnormality.  There is minimal volume loss and nonspecific white matter change.  The brain is essentially normal for age.  There is no intracranial hemorrhage or mass/mass effect.  There are no regions of restricted diffusion to suggest acute infarction.  There is no hemorrhage.  There is no pathologic enhancement in the brain or its coverings.  There is no hydrocephalus or midline shift.  The basilar cisterns are open.  Flow voids indicating patency are present in the major vessels at the base of the brain.  The cerebellar tonsils are in normal position at the level of the foramen magnum.  Pituitary volume is decreased, not unexpected for age.  The orbits are grossly normal.    Skull/extracranial contents:    Marrow signal intensity is normal.  There is a left-sided gustavo bullosa.  There is trace mucosal  thickening in the anterior ethmoid air cells.  Otherwise, the paranasal sinuses are clear.  There is a small inferior right mastoid effusion.  The left mastoid air cells are clear.      Impression       1. There is no acute abnormality.  There is minimal volume loss and nonspecific white matter change.  There is no intracranial hemorrhage, mass/mass effect, acute infarction or pathologic enhancement.  Please see above discussion         MRI lumbar spine 7/25/18:  EXAMINATION:  MRI LUMBAR SPINE WITHOUT CONTRAST    CLINICAL HISTORY:  worsening right foot drop; Foot drop, right foot    TECHNIQUE:  Multiplanar, multisequence MR images were acquired from the thoracolumbar junction to the sacrum without the administration of contrast.    COMPARISON:  None.    FINDINGS:  Vertebral column: The lumbar vertebral bodies maintain normal height and alignment.  There is mild degenerative endplate signal change anteriorly at the L2-3, L4-5 and L5-S1 levels where there is mild osteophyte formation.  Baseline marrow signal intensity is normal.  The discs are mildly desiccated but there is no significant disc space narrowing.    Spinal canal, conus, epidural space: The spinal canal is developmentally normal.  The conus terminates at the level of L1 and is normal in contour and signal intensity.  There is no abnormal epidural mass or fluid collection.    Findings by level:    On the sagittal images, the T10-11 and T11-12 levels are normal.    T12-L1: Unremarkable.  There is no spinal canal or significant foraminal stenosis.    L1-2: Unremarkable.  There is no spinal canal or significant foraminal stenosis.    L2-3: There is very mild disc space narrowing.  There is minimal bulging of the annulus and mild facet joint arthropathy.  There is no spinal canal or significant foraminal stenosis.    L3-4: There is a mild diffuse disc bulge.  There is mild facet joint arthropathy.  There is minimal flattening of the ventral dural sac.  There  is no spinal canal or significant foraminal stenosis.    L4-5: There is a mild diffuse disc bulge with mild-to-moderate facet joint arthropathy and ligamentum flavum thickening.  There is no spinal canal or significant foraminal stenosis.    L5-S1: There is a mild diffuse disc bulge with superimposed shallow broad central disc protrusion.  There is mild-to-moderate facet joint arthropathy.  There is no spinal stenosis.  There is mild-to-moderate bilateral foraminal stenosis.    Soft tissues, other: The prevertebral soft tissues are normal.  There is atherosclerosis of the aorta without focal aneurysmal dilatation.  There is subtle abnormal signal within the gallbladder which could be artifactual represent volume averaging.  Alternatively, gallstones could be present.  Correlate clinically.      Impression       1. There is relatively mild multilevel degenerative change in the lumbar spine.  There is no fracture or malalignment.  There is mild-to-moderate bilateral foraminal stenosis at the L5-S1 level.  Otherwise, there may be mild degenerative change but there is no spinal canal or significant foraminal stenosis at the remainder of the lumbar levels.  2. Question cholelithiasis.  There is nonspecific signal in the gallbladder which could be artifactual or represent volume averaging.       Assessment and Plan:  Arianne Johnson is a 78 y.o., right handed woman with a history of hyperlipidemia who presents today with a hoarse voice, hyperreflexia, right lower extremity foot drop and an abnormal EMG. These findings are very concerning for motor neuron disease, in particular ALS.  She currently has minor, localized symptoms that could potentially be explained by other conditions, however the EMG is fairly compelling.  She will start with blood work including angiotensin-converting enzyme, ganglioside antibody panel, serum protein electrophoresis and immunofixation, paraneoplastic antibody, and sedimentation rate.  Her CK  has already been evaluated and is normal.  Based on these findings there may be evidence to suggest a further workup in another direction.  If these are all negative, then that may be compelling evidence for motor neuron disease.  We will review at her follow-up appointment.  It is too soon to come to a conclusion at this time.     The patient will return to clinic in a couple weeks.    Important to note, also  has a past medical history of Abnormal Pap smear, Breast disorder, GERD (gastroesophageal reflux disease), Hay fever, Hyperlipidemia, and Seizures (02/1960).          Savannah Elaine D.O, ABPN, AOBNP, ABEM    This note was created with voice recognition software.  Grammatical, syntax and spelling errors may be inevitable.

## 2018-10-08 NOTE — PATIENT INSTRUCTIONS
Will plan to get blood work to look for antibodies against muscle or nerve.    From there will either get further blood work, imaging, or be able to come to a conclusion on diagnosis.

## 2018-10-08 NOTE — LETTER
October 22, 2018      Chiki Paniagua MD  [address] [facility] [place] [address]           [facility]  [address] [place] [address]  Phone: [phone]  Fax: [phone]          Patient: Arianne Johnson   MR Number: [id number]   YOB: 1940   Date of Visit: 10/8/2018       Dear Dr. Chiki Paniagua:    Thank you for referring Arianne Johnson to me for evaluation. Attached you will find relevant portions of my assessment and plan of care.    If you have questions, please do not hesitate to call me. I look forward to following Arianne Johnson along with you.    Sincerely,    Michael Walton  CC:  No Recipients    If you would like to receive this communication electronically, please contact [email or web address] or [phone] to request more information on [facility] Link access.    For providers and/or their staff who would like to refer a patient to [facility], please contact us through our one-stop-shop provider referral line, [facility] , at [phone].    If you feel you have received this communication in error or would no longer like to receive these types of communications, please e-mail [email or web address]

## 2018-10-09 LAB
ALBUMIN SERPL ELPH-MCNC: 3.7 G/DL
ALPHA1 GLOB SERPL ELPH-MCNC: 0.36 G/DL
ALPHA2 GLOB SERPL ELPH-MCNC: 0.96 G/DL
B-GLOBULIN SERPL ELPH-MCNC: 0.94 G/DL
GAMMA GLOB SERPL ELPH-MCNC: 0.74 G/DL
INTERPRETATION SERPL IFE-IMP: NORMAL
PATHOLOGIST INTERPRETATION IFE: NORMAL
PATHOLOGIST INTERPRETATION SPE: NORMAL
PROT SERPL-MCNC: 6.7 G/DL

## 2018-10-16 NOTE — PROGRESS NOTES
Subjective:       Patient ID: Arianne Johnson is a 78 y.o. female.    Chief Complaint: Follow-up    Arianne is here for follow-up of cough, LPR, possible ILS. Overall doing better. Still with some hoarseness and dysphagia. This is intermittent, dysphagia mainly occurs with solids but with pills as well. This does improve with small bites and liquid washes.     She is being worked up for a muscular weakness, seeing Dr. Elaine. She has evidence of active axonal degeneration and there is concern for a systemic neurologic disease process.    Review of Systems   Constitutional: Negative for activity change and appetite change.   Respiratory: Negative for difficulty breathing and wheezing   Cardiovascular: Negative for chest pain.      Objective:        Constitutional:   Vital signs are normal. She appears well-developed and well-nourished.     Head:  Normocephalic and atraumatic.   No tongue fasciculations      Mouth/Throat  Lips, teeth, and gums normal and oropharynx normal.   Anterior larynx          Tests / Results:  None    Assessment:       1. Laryngopharyngeal reflux (LPR)    2. Oropharyngeal dysphagia          Plan:       Globus improved, still with intermittent dysphagia / voice changes, which may be due to normal aging process vs. Neurologic disease. For now continue LPR meds. Can wean Zantac as able  Consider Speech therapy for dysphagia pending workup with Neurology.  Fu 3 months or sooner prn

## 2018-10-25 NOTE — PROGRESS NOTES
NEUROLOGY  Outpatient Follow Up    [facility]  [address], [place] [address]  [phone] (office) / [phone] (fax)    Patient Name:  Arianne Johnson  :  1940  MR #:  [id number]  Acct #:  [id number]    Date of Neurology Visit: 10/25/2018  Name of Neurologist: Savannah Elaine D.O, ABPN, AOBNP, ABEM    Other Physicians:  Chiki Paniagua MD (Primary Care Physician); No ref. provider found (Referring)      Chief Complaint: Results      History of Present Illness (HPI):  Arianne Johnson is a 78 y.o. female here for follow up after her blood work.  She is here with her son and daughter.    She states since her last visit she has been having a lot of fatigue.  She has also found herself with trouble talking and swallowing.  She states if she takes small bites with a drink, she does well.  But if she doesn't do this she can't complete the swallow.  She is not coughing or choking on food or liquid.  She is coughing in general.      She has shortness of breath, she is not sure how long this has been present.  She had her PCP evaluate her and was seen by a lung doctor.  She was seen by Dr. Ward at [facility].  She was put on Brio which helped the breathing, but she feels this has caused the swallowing trouble and hoarseness.  She stopped the Brio in May and her symptoms have not improved.  She is on albuterol now.  She had PFTs with Dr. Ward (these are not available today).      She has a history of GERD.      She was seen by an ENT.  They did a laryngoscopy and she was told everything looked good, but it was red.  She was put on medication for acid reflux.    She feels that the left leg will get weak when she walks a lot.  She also notices her  is declining in both hands.      Treatment to date:    N/A    Review of Systems:    General: Weight gain: No, Weight Loss: Yes, Fatigue: Yes,   Fever: No, Chills: No, Night Sweats: No, Insomnia: No, Excessive sleeping: No    Respiratory:  Cough: Yes, Shortness of Breath: Yes,   Wheezing: No, Excessive Snoring: No, Coughing up blood: No  Endocrine: Heat Intolerance: No, Cold Intolerance: No,   Excessive Thirst: Yes, Excessive Hunger: No,   Eyes:  Blurred Vision: No, Double Vision: No,   Light Sensitivity: No, Eye pain: No  Musculoskeletal: Muscle Aches/Pain: No, Joint Pain/Swelling: No, Muscle Cramps: Yes, Muscle Weakness: Yes, Neck Pain: Yes, Back Pain: Yes   Neurological: Difficulty Walking/Falls: Yes, Headache Migraine: No, Dizziness/Vertigo: Yes, Fainting: No, Difficulty with Speech: No, Weakness: Yes, Tingling/Numbness: No, Tremors: No, Memory Problems: No, Seizures: No, Difficulty Swallowing: Yes, Altered Taste: No.  Cardiovascular: Chest Pain: No, Shortness of Breath: Yes,   Palpitations: No,  Gastrointestinal: Nausea/Vomiting: No, Constipation: No, Diarrhea: No, Bloody Stools: No   Psych/Cog:  Depression: No, Anxiety: No, Hallucinations: No, Problems Concentrating: No  : Frequent Urination: No, Incontinence: Yes, Blood of Urine: No, Urinary Infections: No, Changes in Sex Drive: No   ENT:Hearing Loss: No, Earache: No, Ringing in Ears: No,   Facial Pain: No, Chronic Congestion: No   Immune: Seasonal Allergies: Yes, Hives and/or Rashes: No  The remainder of the review of twelve body systems was reviewed and normal.    ALSFRS: 36 (see details at end of document)    Past Medical, Surgical, Family & Social History:   Reviewed and updated.    Home Medications:     Current Outpatient Medications:     albuterol (PROVENTIL) 2.5 mg /3 mL (0.083 %) nebulizer solution, USE 1 AMPULE PER NEBULIZER 3 TIMES DAILY, Disp: , Rfl: 0    atorvastatin (LIPITOR) 40 MG tablet, TAKE 1 TABLET(40 MG) BY MOUTH EVERY DAY, Disp: 90 tablet, Rfl: 3    calcium-vitamin D3 500 mg(1,250mg) -200 unit per tablet, Take 1 tablet by mouth 2 (two) times daily with meals., Disp: , Rfl:     fluticasone (FLONASE) 50 mcg/actuation nasal spray, daily as needed. , Disp:  , Rfl:     ibandronate (BONIVA) 150 mg tablet, TAKE 1 TABLET(150 MG) BY MOUTH EVERY 30 DAYS, Disp: 1 tablet, Rfl: 11    meclizine (ANTIVERT) 25 mg tablet, Take 1 tablet (25 mg total) by mouth 3 (three) times daily as needed., Disp: 30 tablet, Rfl: 3    montelukast (SINGULAIR) 10 mg tablet, every evening. , Disp: , Rfl:     multivitamin (THERAGRAN) per tablet, Take 1 tablet by mouth once daily., Disp: , Rfl:     omeprazole (PRILOSEC) 40 MG capsule, TAKE 1 CAPSULE(40 MG) BY MOUTH EVERY DAY, Disp: 30 capsule, Rfl: 11    PROAIR HFA 90 mcg/actuation inhaler, daily as needed. , Disp: , Rfl:     ranitidine (ZANTAC) 300 MG tablet, Take 1 tablet (300 mg total) by mouth every evening., Disp: 30 tablet, Rfl: 6    Physical Examination:  /62   Pulse 93   Resp 20   Ht 5' (1.524 m)   Wt 56.2 kg (123 lb 14.4 oz)   BMI 24.20 kg/m²     GENERAL:  General appearance: Well, non-toxic appearing.  No apparent distress.  Extremities: normal.    MENTAL STATUS:  Alertness, attention span & concentration: normal.  Language: normal.  Orientation to self, place & time:  normal.  Memory, recent & remote: normal.  Fund of knowledge: normal.     SPEECH:  Hoarse and fluent.  Follows complex commands.     CRANIAL NERVES:  Cranial Nerves II-XII were examined.  II - Visual fields: normal.  III, IV, VI: PERRL, EOMI, No ptosis, No nystagmus.  V - Facial sensation: normal.  VII - Face symmetry & mobility: normal.  VIII - Hearing: normal.  IX, X - Palate: elevates on left more than right.  XI - Shoulder shrug: normal.  XII - Tongue protrusion: normal.     GROSS MOTOR:  Gait & station: slow, with normal width base, uses cane, no toe drag, steppage, or circumduction.  No scissoring.  Tone: normal.  Abnormal movements: none.  Finger-nose & Heel-knee-shin: normal.  Rapid alternating movements & drift: normal.  Romberg: absent     MUSCLE STRENGTH:      Frequently induced cramps.     Fascics Atrophy RIGHT      LEFT Atrophy Fascics       5 Neck  Ext. 5           5 Neck Flex 5           5 Deltoids 5           5 Sh.Ext.Rot. 5           5 Sh.Int.Rot. 5           5 Biceps 5           5 Triceps 5           5 Forearm.Pr. 5           5 Wrist Ext. 5           5 Wrist Flex 5           5 Finger Ext. 5           5 Finger Flex 5           5 FPL 5           5 Inteross. 5                                           5- Iliopsoas 5-           5 Hip Abduct 5           5 Hip Adduct 5           5- Quads 5           5 Hams 5           4 Dorsiflex 5           5 Plantar Flex 5           5 Ankle Darius 5           5 Ankle Invert 5           4 Toe Ext. 5           5 Toe Flex 5                                          REFLEXES:     RIGHT Reflex    LEFT   2+ Biceps 2+   2+ Brachiorad. 2+   2+ Triceps 2+   ++ Pectoralis ++     Jaw Jerk     - Hutchison's -           2+ Patellar 2+   2+ Ankle 2+   - Suprapatellar -                   Down PLANTAR Down                  SENSORY:  Sharp touch: decreased sensation in hands and arms, more on the right than the left.  Decreased sensation in the lateral lower legs on both sides.  No stocking pattern.  Vibration: Normal throughout.       Diagnostic Data Reviewed:    Records from her referring provider were reviewed.  She had been seen 3-4 months prior for bilateral lower extremity weakness.  Weakness was predominantly below the knees and worse on the right.  She had noted that she was dragging her foot while she was walking.  Her right leg seemed to be getting weaker in general.  She had had an MRI of the brain recently which was reportedly normal.        Component      Latest Ref Rng & Units 9/18/2018   CPK      20 - 180 U/L 147   Vitamin B12 Bind Capacity      800 - 2600 pg/mL 1119         EMG 9/25/18  Similar to recent electrodiagnostic testing of the bilateral lower extremities on 9/13/18, today's test results yielded significant findings.  There were signs of active axonal denervation seen in nearly all muscles sampled throughout the bilateral  upper extremities.  Additionally, there were similar findings seen in the thoracic paraspinal musculature, as well as complex repetitive discharges seen in the tongue.  Overall, this appears to be a picture of a systemic neurologic disease process.        EMG 9/13/18  I reviewed her recent imaging consisting of MRIs of the lumbar spine and brain.  The studies relatively benign and cannot explain today's electrodiagnostic findings.  Further workup is required.  Brief neurologic exam today revealed bilateral Hutchison's reflexes.  At this point, we will have her return for EMG of the bilateral upper extremities.  I will also order a serum CK level.  We may consider neurology referral pending today's results.     MRI brain 7/25/18:       EXAMINATION:  MRI BRAIN W WO CONTRAST    CLINICAL HISTORY:  right lower ext weakness;.  Other symptoms and signs involving the musculoskeletal system    TECHNIQUE:  Multiplanar multisequence MR imaging of the brain was performed before and after the administration of 5 mL Gadavist intravenous contrast.  Diffusion-weighted imaging was performed.  ADC map was generated.    COMPARISON:  None.    FINDINGS:  Intracranial compartment:    There is no acute intracranial abnormality.  There is minimal volume loss and nonspecific white matter change.  The brain is essentially normal for age.  There is no intracranial hemorrhage or mass/mass effect.  There are no regions of restricted diffusion to suggest acute infarction.  There is no hemorrhage.  There is no pathologic enhancement in the brain or its coverings.  There is no hydrocephalus or midline shift.  The basilar cisterns are open.  Flow voids indicating patency are present in the major vessels at the base of the brain.  The cerebellar tonsils are in normal position at the level of the foramen magnum.  Pituitary volume is decreased, not unexpected for age.  The orbits are grossly normal.    Skull/extracranial contents:    Marrow signal  intensity is normal.  There is a left-sided gustavo bullosa.  There is trace mucosal thickening in the anterior ethmoid air cells.  Otherwise, the paranasal sinuses are clear.  There is a small inferior right mastoid effusion.  The left mastoid air cells are clear.       Impression         1. There is no acute abnormality.  There is minimal volume loss and nonspecific white matter change.  There is no intracranial hemorrhage, mass/mass effect, acute infarction or pathologic enhancement.  Please see above discussion            MRI lumbar spine 7/25/18:       EXAMINATION:  MRI LUMBAR SPINE WITHOUT CONTRAST    CLINICAL HISTORY:  worsening right foot drop; Foot drop, right foot    TECHNIQUE:  Multiplanar, multisequence MR images were acquired from the thoracolumbar junction to the sacrum without the administration of contrast.    COMPARISON:  None.    FINDINGS:  Vertebral column: The lumbar vertebral bodies maintain normal height and alignment.  There is mild degenerative endplate signal change anteriorly at the L2-3, L4-5 and L5-S1 levels where there is mild osteophyte formation.  Baseline marrow signal intensity is normal.  The discs are mildly desiccated but there is no significant disc space narrowing.    Spinal canal, conus, epidural space: The spinal canal is developmentally normal.  The conus terminates at the level of L1 and is normal in contour and signal intensity.  There is no abnormal epidural mass or fluid collection.    Findings by level:    On the sagittal images, the T10-11 and T11-12 levels are normal.    T12-L1: Unremarkable.  There is no spinal canal or significant foraminal stenosis.    L1-2: Unremarkable.  There is no spinal canal or significant foraminal stenosis.    L2-3: There is very mild disc space narrowing.  There is minimal bulging of the annulus and mild facet joint arthropathy.  There is no spinal canal or significant foraminal stenosis.    L3-4: There is a mild diffuse disc bulge.  There  is mild facet joint arthropathy.  There is minimal flattening of the ventral dural sac.  There is no spinal canal or significant foraminal stenosis.    L4-5: There is a mild diffuse disc bulge with mild-to-moderate facet joint arthropathy and ligamentum flavum thickening.  There is no spinal canal or significant foraminal stenosis.    L5-S1: There is a mild diffuse disc bulge with superimposed shallow broad central disc protrusion.  There is mild-to-moderate facet joint arthropathy.  There is no spinal stenosis.  There is mild-to-moderate bilateral foraminal stenosis.    Soft tissues, other: The prevertebral soft tissues are normal.  There is atherosclerosis of the aorta without focal aneurysmal dilatation.  There is subtle abnormal signal within the gallbladder which could be artifactual represent volume averaging.  Alternatively, gallstones could be present.  Correlate clinically.       Impression         1. There is relatively mild multilevel degenerative change in the lumbar spine.  There is no fracture or malalignment.  There is mild-to-moderate bilateral foraminal stenosis at the L5-S1 level.  Otherwise, there may be mild degenerative change but there is no spinal canal or significant foraminal stenosis at the remainder of the lumbar levels.  2. Question cholelithiasis.  There is nonspecific signal in the gallbladder which could be artifactual or represent volume averaging.       Component      Latest Ref Rng & Units 10/8/2018   NMO Interpretive Comments       SEE BELOW   PAVAL TUTU-1, Serum      <1:240 titer Negative   PAVAL reflex test added       None.   PAVAL TUTU-2, Serum      <1:240 titer Negative   PAVAL TUTU-3, Serum      <1:240 titer Negative   PAVAL AGNA-1, Serum      <1:240 titer Negative   PAVAL, PCA-1, Serum      <1:240 titer Negative   PAVAL, PCA-2, Serum      <1:240 titer Negative   PAVAL, PCA-Tr, Serum      <1:240 titer Negative   PAVAL,  Amphiphysin Ab, Serum      <1:240 titer Negative    CRMP-5 IgG      <1:240 titer Negative   Striated Muscle Ab      <1:120 titer Negative   P/Q Type Calcium Channel Ab      <=0.02 nmol/L 0.00   N-Type Calcium Channel Ab      <=0.03 nmol/L 0.02   AChR Binding Ab, Serum      <=0.02 nmol/L 0.00   AChR Ganglionic Neuronal Ab      <=0.02 nmol/L 0.00   Neuronal (V-G) K+ Channel Ab, Serum      <=0.02 nmol/L 0.00   IgG Monos, Gm2      <=1:500 <1:250   IgM Monos, Gm2      <=1:1000 <1:250   IgG Asialo, Gm2      <=1:4000 <1:250   IgM Asialo, Gm2      <=1:4000 <1:250   IgG Disialo, Gd1B      <=1:1000 <1:250   IgM Disialo, Gd1B      <=1:1000 <1:250   Gangliioside Ab Comment       Test Not Performed   Protein, Serum      6.0 - 8.4 g/dL 6.7   Albumin grams/dl      3.35 - 5.55 g/dL 3.70   Alpha-1 grams/dl      0.17 - 0.41 g/dL 0.36   Alpha-2 grams/dl      0.43 - 0.99 g/dL 0.96   Beta grams/dl      0.50 - 1.10 g/dL 0.94   Gamma grams/dl      0.67 - 1.58 g/dL 0.74   Immunofix Interp.       SEE COMMENT   Sed Rate      0 - 20 mm/Hr 25 (H)   Angio Convert Enzyme      8 - 53 U/L 28   Pathologist Interpretation LIVE       REVIEWED   Pathologist Interpretation SPE       REVIEWED       Assessment and Plan:    Arianne Johnson is a 78 y.o., right handed woman with a history of hyperlipidemia who has been experiencing hoarse voice, hyperreflexia, right lower extremity foot drop and an abnormal EMG. These findings are very concerning for motor neuron disease, in particular ALS.  Supportive evidence includes a compelling EMG, negative bloodwork, and a mix of upper and lower motor neuron findings on clinical exam.      We reviewed the diagnosis of ALS.  I reviewed that most cases are sporadic onset, only 10-15% are familial.  I reviewed this only affects voluntary muscles and spares muscles of the heart, bowel and bladder.  Less than 20% of patients will experience cognitive problems.  It is a progressive condition with time and is ultimately fatal.  I advised that he would be provided with more  information at our next appointment.  We reviewed that Rilutek has been the only FDA approved medication for slowing the progression of ALS for over 20 years.  We discussed the pros and cons of Rilutek including potential side effects such as nausea and anorexia. We reviewed that bloodwork is checked on a regular basis to monitor blood counts and liver enzymes.  We also reviewed that a new medication has been FDA approved for slowing the progression of ALS.  Radicava is an IV infusion medication.  This might require placement of a port to use this medication long term.  It is given on 14 consecutive days the first month and then 10 consecutive days every month thereafter.  The most reported side effects are feeling off balance.  Unfortunately, this medication has many restrictions on it, one of the most significant is that patients can only be minimally affected by ALS symptoms (no score of 2 or less on ALSFRS).  She has agreed to start Rilutek twice a day.  She will have bloodwork drawn monthly for the first 3 months then every 3 months thereafter to monitor liver enzymes and blood counts.  A prescription for Radicava has been submitted for approval.    She will follow up in ALS clinic at her next appointment.  She will follow up with her pulmonologist as planned until her first ALS clinic visit.    Clinical trials were reviewed.  They were educated on how to find trials and whether they might want to participate.    The patient will return to clinic in 2-3 months.    Important to note, also  has a past medical history of Abnormal Pap smear, Breast disorder, GERD (gastroesophageal reflux disease), Hay fever, Hyperlipidemia, and Seizures ([birth year]1960).    Time Spent:  45 minutes spent face-to-face, >50% spent advising about: test resutls, problems, diagnosis, plan, prognosis       Savannah Elaine D.O, ABPN, AOBNP, ABEM      This note was created with voice recognition software.  Grammatical, syntax and spelling  errors may be inevitable.

## 2018-10-25 NOTE — PATIENT INSTRUCTIONS
You have been diagnosed with ALS which stands for amyotrophic lateral sclerosis.  You will be getting more information on this as time goes by.      Start Radicava infusions once the insurance is approved.  You will get more information about this soon.    Start Rilutek (riluzole) when you get this from the pharmacy.  Take one pill twice a day.  Call Dr. Elaine if you have any trouble with nausea or loss of appetite.    Will plan for a follow up at the ALS clinic on [address].    You will have blood work to follow while on the Rilutek.  Get your blood work drawn the first time one month after starting Rilutek.  Then in another month.      The website with information on studies is clinicaltrials.gov.  Feel free to look through this.

## 2018-10-25 NOTE — TELEPHONE ENCOUNTER
This pt has been diagnosed with ALS and Dr Elaine would like for her to be scheduled in the ALS clinic. Will you please reach out to this pt to schedule? Thank you.

## 2018-11-07 NOTE — TELEPHONE ENCOUNTER
RN Coordinator spoke with Mrs. Johnson regarding ALS Clinic appt on 11/28/18. Provided patient with overview of Multidisciplinary Team, location, and general flow of clinic. Will mail ALS Clinic packet that includes team member's bios, clinic guidelines, ALS resources, etc.     Patient noted that she feels this year (2018) has been a horrible year, b/c she lost her son in March, spouse in August, then Dx with ALS in October. Since diagnosis, her daughter Geena (& her ) have moved into the home with Mrs. Acuña.     Encouraged patient to bring a family or friend with her to clinic for support and to help absorb information presented by each discipline. Mrs. Acuña indicated that her children & granddaughter would like to attend clinic with her.  [ Children: Geena Rivera, Mitesh Elizabeth, Angelia Elizabeth, And Granddaughter: Julieta Park]. Amira's mother Gita Hidalgo resides out of [facility].     Also, Mrs. Acuña mentioned that she has been in contact with Dr. Elaine's office regarding Radicava Infusions. She is eager to began treatment. Explained to patient the process of [facility], Identifying an infusion company, and insurance authorization. She verbalized understanding.

## 2018-11-13 NOTE — TELEPHONE ENCOUNTER
Spoke with the pt, informed her we received the reply from [employer] regarding coverage and payment assistance . All of that info along with the order has been faxed to [facility] and [facility]. I told the pt that the hospital will now be in touch with her insurance and pharmacy to initiate the coverage and administration. [facility] may not be able to see the pt or get coverage for over a month. [facility] may be able to see sooner. Pt verbalized understanding.

## 2018-11-19 NOTE — PROGRESS NOTES
Subjective:       Patient ID: Arianne Johnson is a 78 y.o. female.    Chief Complaint: Weakness    HPI     Was diagnosed with als on 10/25/18.  On riluzole    Copd stable    gerd stable.     Review of Systems      Review of Systems   Constitutional: Negative for fever and chills.   HENT: Negative for hearing loss and neck stiffness.    Eyes: Negative for redness and itching.   Respiratory: Negative for choking.    Cardiovascular: Negative for chest pain and leg swelling.  Abdomen: Negative for abdominal pain and blood in stool.   Genitourinary: Negative for dysuria and flank pain.   Musculoskeletal: Negative for back pain  Neurological: Negative for light-headedness and headaches.   Hematological: Negative for adenopathy.   Psychiatric/Behavioral: Negative for behavioral problems.       Objective:      Physical Exam   Constitutional: She appears well-developed.   HENT:   Head: Normocephalic and atraumatic.   Eyes: Conjunctivae are normal. Pupils are equal, round, and reactive to light.   Neck: Normal range of motion.   Cardiovascular: Normal rate and regular rhythm.   No murmur heard.  Pulmonary/Chest: Effort normal and breath sounds normal.   Lymphadenopathy:     She has no cervical adenopathy.       Assessment:       1. ALS (amyotrophic lateral sclerosis)    2. Menopause    3. Gastroesophageal reflux disease, esophagitis presence not specified        Plan:       ALS (amyotrophic lateral sclerosis)    Menopause  -     DXA Bone Density Spine And Hip; Future; Expected date: 11/19/2018    Gastroesophageal reflux disease, esophagitis presence not specified          Plan:  See order  Cont current meds  F/u with als clinic       Medication List           Accurate as of 11/19/18  2:22 PM. If you have any questions, ask your nurse or doctor.               CONTINUE taking these medications    atorvastatin 40 MG tablet  Commonly known as:  LIPITOR  TAKE 1 TABLET(40 MG) BY MOUTH EVERY DAY     calcium-vitamin D3 500 mg(1,250mg)  -200 unit per tablet  Commonly known as:  OS-VIRGIE 500 + D3     fluticasone 50 mcg/actuation nasal spray  Commonly known as:  FLONASE     ibandronate 150 mg tablet  Commonly known as:  BONIVA  TAKE 1 TABLET(150 MG) BY MOUTH EVERY 30 DAYS     meclizine 25 mg tablet  Commonly known as:  ANTIVERT  Take 1 tablet (25 mg total) by mouth 3 (three) times daily as needed.     montelukast 10 mg tablet  Commonly known as:  SINGULAIR     omeprazole 40 MG capsule  Commonly known as:  PRILOSEC  TAKE 1 CAPSULE(40 MG) BY MOUTH EVERY DAY     * PROAIR HFA 90 mcg/actuation inhaler  Generic drug:  albuterol     * albuterol 2.5 mg /3 mL (0.083 %) nebulizer solution  Commonly known as:  PROVENTIL     riluzole 50 mg Tab tablet  Take 1 tablet (50 mg total) by mouth every 12 (twelve) hours.         * This list has 2 medication(s) that are the same as other medications prescribed for you. Read the directions carefully, and ask your doctor or other care provider to review them with you.            STOP taking these medications    multivitamin per tablet  Commonly known as:  THERAGRAN  Stopped by:  Chiki Paniagua MD     ranitidine 300 MG tablet  Commonly known as:  ZANTAC  Stopped by:  Chiki Paniagua MD

## 2018-11-20 NOTE — TELEPHONE ENCOUNTER
Spoke with the pt after speaking with [facility]. I informed the pt that she would be receiving the infusion at their facility as they could not get Radicava at [facility] in [place]. Pt was fine with that. Nora at the infusion center stated the pt will begin the infusion on Dec 3rd to avoid holiday infusions. I provided the pt with the number to the infusion center for any further assistance. I encouraged the pt to sign up for Medicare Part D in order to have infusion in the home.

## 2018-11-28 PROBLEM — Z78.9 IMPAIRED MOBILITY AND ADLS: Status: ACTIVE | Noted: 2018-01-01

## 2018-11-28 PROBLEM — J96.10 CHRONIC NEUROMUSCULAR RESPIRATORY FAILURE: Status: ACTIVE | Noted: 2018-01-01

## 2018-11-28 PROBLEM — R47.1 DYSARTHRIA: Status: ACTIVE | Noted: 2018-01-01

## 2018-11-28 PROBLEM — R13.10 DYSPHAGIA: Status: ACTIVE | Noted: 2018-01-01

## 2018-11-28 PROBLEM — R53.83 FATIGUE: Status: ACTIVE | Noted: 2018-01-01

## 2018-11-28 PROBLEM — Z74.09 IMPAIRED MOBILITY AND ADLS: Status: ACTIVE | Noted: 2018-01-01

## 2018-11-28 PROBLEM — G12.21 ALS (AMYOTROPHIC LATERAL SCLEROSIS): Status: ACTIVE | Noted: 2018-01-01

## 2018-11-28 PROBLEM — M21.371 RIGHT FOOT DROP: Status: ACTIVE | Noted: 2018-01-01

## 2018-11-28 NOTE — PROGRESS NOTES
Study title: Biobank    IRB #: [id number]  ICF approval date: 08/10/2018  PI Inocencia David, PhD     Informed Consent Process  Present for discussion: Yes  Is LAR Consenting for Subject: no     Prior to the Informed Consent (IC) being signed, or any protocol required testing, procedure, or intervention being performed, the following was done or discussed:  · Purpose of the Study, Qualifications to Participate: yes  · Study Design, Schedule and Procedures: yes  · Risks, Benefits, Alternative Treatments, Compensation and Costs: yes  · Confidentiality and HIPAA Authorization for Release of Medical Records for the research trial/subject's right/study related injury: yes  · Study related contact information: yes  · Voluntary Participation and Withdrawal from the research trial at any time: yes  · Optional samples/procedures (if applicable): yes  · Patient has been offered the opportunity to ask questions regarding the study and all questions were answered satisfactorily: yes  · Patient verbalizes understanding of the study/procedures and agrees to participate: yes  · CRC and PI contact information given to patient: yes  · Signed copy given to patient: yes  · Copy in patient's chart and original uploaded to Epic: yes     Person Obtaining Consent: Samantha Beltre     Subject consented to be part of ALS biobank. Patient expressed no concerns and no questions.     Lab unable to collect at today's visit, will collect at next ALS clinic visit.

## 2018-11-28 NOTE — PROGRESS NOTES
Pt is a 79 y/o w/w/f seen today in ALS Clinic for 1st clinic visit.  Pt is alert and oriented to person, place, and time.  She responds appropriately during conversation.  Pt's family present for clinic visit  dtr Geena, son Joe, granddtr Clarissa.  Pt lives at home with her dtr Geena and son in law Saravanan.  Pt's spouse  2018.  Pt reports she is independent with ALDs for the most part however does receive assistance with bathing.  Pt c/o about LE weakness and fatigue.  Pt does ambulate with cane.  Pt has support of her children; Geena, Joe, Mitesh, Jimena, Gita, Tutu ( 3/2018) as well as grandchildren.  Pt receives [employer] survivors benefits from spouse and his detention.   SW discussed potential community resources (community choices waiver) and provided informational sheet which stipulates income requirements and encouraged them to apply.  SW emphasized the importance of family being available to provide care as availability of community resources (free services) are very limited.  Pt/family verbalized understanding.  Pt/family verbalized no needs at this time.        JANES COOK

## 2018-11-28 NOTE — PROGRESS NOTES
OUTPATIENT NEUROLOGICAL REHABILITATION  PHYSICAL THERAPY EVALUATION AT ALS MULTIDISCIPLINARY CLINIC    Name: Arianne Johnson  Clinic Number: [id number]    Diagnosis:   Encounter Diagnoses   Name Primary?    ALS (amyotrophic lateral sclerosis) Yes    Chronic neuromuscular respiratory failure     Impaired mobility and ADLs     Dysarthria     Dysphagia, unspecified type     Nutritional assessment     Right foot drop     Other fatigue     Rhinosinusitis     Cough      Physician: Dr. Elaine  Treatment Orders: PT Eval at ALS clinic  Past Medical History:   Diagnosis Date    Abnormal Pap smear     repeats were normal    Breast disorder     benign left breast biopsy    GERD (gastroesophageal reflux disease)     Hay fever     Hyperlipidemia     Seizures 02/1960    after giving birth, none since       Clinic Evaluation Date: 11/28/2018  ALS clinic visit #: 1  Plan of care expiration: Eval Only   Precautions: Fall risk    Time in: 11:40 AM  Time out: 12:10 PM    History & Subjective     Medical Diagnosis: ALS  PT Diagnosis:   Encounter Diagnoses   Name Primary?    ALS (amyotrophic lateral sclerosis) Yes    Chronic neuromuscular respiratory failure     Impaired mobility and ADLs     Dysarthria     Dysphagia, unspecified type     Nutritional assessment     Right foot drop     Other fatigue     Rhinosinusitis     Cough        History of Present Illness: Arianne is a 78 y.o. female that presents to [facility] secondary to dx of ALS. Pt was first diagnosed 10/25/18. Symptom onset was March 2018 with initial symptoms of speech and swallowing followed by limbs.       Current Functional Deficits/Chief Complaint:   1. Fatigue  2. R LE weakness  3. R foot slap  4. Hands weak    Prior Therapy: none  Social History:  recently passed. Daughter and son in law moved in with her.   Place of Residence (Steps/Adaptations/Levels): [address]; 1 level home  Home adaptations: handrails added in  bathroom  DME owned: Small based Quad cane  Exercise routine: none    Pt stated goals/concerns: To learn about ALS and recommendations    Family present/states: daughter, son and granddaughter present  Pain: 0/10    Falls: none    Objective     Mental status: alert, oriented to person, place, and time  Appearance: Casually dressed  Behavior:  calm and cooperative  Attention Span and Concentration:  Normal    Dominant hand:  right     Posture Alignment: forward head    Sensation: Light Touch: Intact         Tone: decreased  Limbs/muscles affected: R LE > L LE    Edema: none noted      Coordination:    - LE coordination:  impaired - moderate R > L     Lower Extremity ROM - WNL for all    Lower Extremity Strength  Right LE  Left LE    Hip flexion: 3+/5 Hip flexion: 4-/5   Hip abduction: 3+/5 Hip abduction: 3+/5   Hip adduction: 4-/5 Hip adduction 4-/5   Knee extension: 4/5 Knee extension: 4/5   Knee flexion: 4/5 Knee flexion: 4/5   Ankle dorsiflexion: 3+/5 Ankle dorsiflexion: 4/5      Evaluation   Activities Balance Confidence (ABC) scale  43%     Sitting balance static: good  Sitting balance dynamic: good  Standing balance static: fair  Standing balance dynamic:  fair    Gait Assessment:   - AD used: Small base quad cane  - Assistance: modified independence  - Distance: limited community distances    GAIT DEVIATIONS:  Arianne displays the following deviations with ambulation:  Abnormal and Drop-foot, decreased mirna, increased time in double stance and decreased toe-to-floor clearance   Impairments contributing to deviations: impaired motor control and decreased strength    Endurance Deficit: mod/severe    Functional Mobility (Bed mobility, transfers)  Bed mobility: Mod I  Supine to sit: Mod I  Sit to supine: Mod I  Transfers to bed: Mod I  Transfers to toilet: Mod I  Sit to stand:  Mod I  Stand pivot:  Mod I  Car transfers: Mod I  Wheelchair mobility: n/a    Education provided re: POC, AFO, exercise guidelines and  energy conservation.   Arianne verbalized good understanding of education provided.   Pt has no cultural, educational or language barriers to learning provided.      Assessment   This is a 78 y.o. female referred to outpatient physical therapy and presents with a medical diagnosis of ALS. She is still ambulatory but needs a SBQC or to hold a grocery cart for all community ambulation. She uses the SBQC in her home as well. She has R foot drop/slap with gait and was educated on AFO use. She was given info on how to order one. She was educated on exercise guidelines and energy conservation for fatigue management. She has no further PT needs at current.     Pt rehab potential is Fair. Pt will benefit from continuing skilled outpatient physical therapy to address the deficits listed below in the problem list, provide pt/family education and to maximize pt's level of independence in the home and community environment.     History  Co-morbidities and personal factors that may impact the plan of care Examination  Body Structures and Functions, activity limitations and participation restrictions that may impact the plan of care    Clinical Presentation   Co-morbidities:   advanced age, history of cancer and transportation assistance required        Personal Factors:   no deficits Body Regions:   back  lower extremities  upper extremities  trunk    Body Systems:    gross symmetry  ROM  strength  gross coordinated movement  balance  gait  transfers  transitions  motor control  motor learning             Activity limitations:   Mobility  lifting and carrying objects  fine hand use (grasping/picking up)  walking  moving around using equipment (WC)  using transportation (bus, train, plane, car)  driving (bike, car, motorcycle)    Self care  looking after one's health    Domestic Life  shopping  cooking  doing house work (cleaning house, washing dishes, laundry)    Community and Social Life  community life  recreation and  leisure  Scientology and spirituality         evolving clinical presentation with changing clinical characteristics                      moderate   high  high Decision Making/ Complexity Score:  moderate     Functional Limitations Reports - G Codes  Category: Mobility   Tool: ABS score  Score: 42.5%  Current: CK at least 40% < 60% impaired, limited or restricted  Goal: CK at least 40% < 60% impaired, limited or restricted  Discharge: CK at least 40% < 60% impaired, limited or restricted      Plan   Pt to continue to follow up with referring provider and ALS clinic.     Lisandra Coreas, PT  11/28/2018

## 2018-11-28 NOTE — PROGRESS NOTES
Referring Physician:No ref. provider Alicja Edwards MD     Reason for visit:  Chief Complaint   Patient presents with    Nutrition Counseling     ALS clinic - initial visit      Initial Visit    :1940     Allergies Reviewed  Meds Reviewed    Anthropometrics  Weight:54.8 kg (120 lb 13 oz)  Height:5' (1.524 m)  BMI:Body mass index is 22.82 kg/m².   IBW:   45.5 kg  +/-10%    Meds:  Outpatient Medications Prior to Visit   Medication Sig Dispense Refill    albuterol (PROVENTIL) 2.5 mg /3 mL (0.083 %) nebulizer solution USE 1 AMPULE PER NEBULIZER 3 TIMES DAILY  0    atorvastatin (LIPITOR) 40 MG tablet TAKE 1 TABLET(40 MG) BY MOUTH EVERY DAY 90 tablet 3    calcium-vitamin D3 500 mg(1,250mg) -200 unit per tablet Take 1 tablet by mouth 2 (two) times daily with meals.      fluticasone (FLONASE) 50 mcg/actuation nasal spray daily as needed.       ibandronate (BONIVA) 150 mg tablet TAKE 1 TABLET(150 MG) BY MOUTH EVERY 30 DAYS 1 tablet 11    meclizine (ANTIVERT) 25 mg tablet Take 1 tablet (25 mg total) by mouth 3 (three) times daily as needed. 30 tablet 3    montelukast (SINGULAIR) 10 mg tablet every evening.       omeprazole (PRILOSEC) 40 MG capsule TAKE 1 CAPSULE(40 MG) BY MOUTH EVERY DAY 30 capsule 11    PROAIR HFA 90 mcg/actuation inhaler daily as needed.       riluzole 50 mg Tab tablet Take 1 tablet (50 mg total) by mouth every 12 (twelve) hours. 60 tablet 11     No facility-administered medications prior to visit.          Vitamins/Supplements/Herbs:  Vit D    Labs: n/a     Nutrition Prescription:    1644 Kcals/day( 30 kcal/kg ),    55 g protein( 1.0 g/kg )     Support System:    Pt's daughter, son and granddaughter present for today's encounter in ALS Multidisciplinary Clinic.  Pt lives with daughter and son-in-law; family is very involved and supportive in patient's care.    Diet Hx:   Pt states she takes small bites and follows food with liquids to help with swallowing.  Usually eats two  meals/day (breakfast and supper), and snacks on sweets/cookies during the day.       Breakfast:   Coffee with cheese toast or cinnamon raisin toast.  Dinner:   last night:  Hamburger helper and sweet tea    Current activity level and/or physical limitations:    Uses cane; sedentary    Motivation to make changes/anticipated barriers and/or expected adherence:    Discussed importance of maintaining/gaining weight by increasing po intake, and including healthy snacks ie peanut butter and honey or cheese toast in addition to sweets.    Nutrition-Focus Physical Findings:    Pt appears well nourished      Assessment:   Pt and family members present in room attentive and asked relevant questions about ways to increase calorie/protein intake; healthy snacks and beverages.  Questions answered, and they verbalized understanding of information.  Note SLP recc MBSS to determine safe diet    Nutrition Diagnosis:   Food- and nutrition-related knowledge deficit RT lack of prior exposure to information AEB dx ALS/unintentional weight loss      Recommendations:   1.  Aim for at least 6 meals and snacks each day  2.  Eat more fat (butter, ortega, salad dressing, peanut butter)  3.  Choose high-calorie drinks, like whole milk, juice, regular soda  4.  Eat fruits to be healthy.  5.  Fix up vegetables to add calories and protein (add cheese sauce, butter, margarine, gravy, oil or salad dressing.    Handouts provided & reviewed:  Food Choices to Add Calories and Protein; High-Calorie, High-Protein Nutrition Therapy; Suggestions for Increasing Calories and Protein; High-Calorie, High-Protein Recipes; High-Calorie, High-Protein Shopping and Cooking Tips;  Soft Foods Nutrition Therapy;  Soft and Moist High-Protein Menu Ideas; Easy-to-Chew-and-Swallow Foods    Strategies Implemented:    Incorporate favorite foods into daily snack routine.    Consultation Time:  25  minutes.    Follow Up:  Pt and family members provided with dietitian contact  number and advised to call with questions or make future appointment if further intervention needed prior to next ALS Clinic.

## 2018-11-28 NOTE — PROGRESS NOTES
NEUROBEHAVIORAL STATUS EXAMINATION - CONFIDENTIAL  ALS CLINIC     Referral Information  Name: Arianne Johnson  MRN: [id number]  IQBAL: 2018  : 1940  Age: 78 y.o.  Race: White  Gender: Female  Referring Provider: No referring provider defined for this encounter.  Referral Reason/Medical Necessity: Evaluate mood and cognitive functioning as part of a multidisciplinary clinic for diagnosis and management of Amyotrophic Lateral Sclerosis (ALS).  Billing: Total licensed neuropsychologists professional time includes: mental status assessment by the billing neuropsychologist, interpreting results, and reporting of results (04402) = 1 hour.     BRIEF HISTORY: Developed foot drop and dysphagia in early . She was diagnosed with suspected ALS in 10/2018.     COGNITIVE/DAILY FUNCTIONING: Ms. Johnson and her family denied any changes in cognition, judgment, or personality. She was fully oriented to time and place.     MOOD: Ms. Johnson denied any considerable changes in her mood and actually feels that she is doing well considering her circumstances. She lost a daughter and her  this year and then was recently diagnosed with ALS. Both she and her family feel that she is doing remarkably well all things considered. She denied active suicidal ideation, plan, or intent.      IMPRESSIONS AND PLAN:     Cognitive: No changes noted. Fully oriented. To diagnosis or concerns of decline at this time.      Mood: No clinically significant symptoms. She resides with 1 daughter and sees her other children regularly, especially since her 's death.      Seth Vega Psy.D., ABPP  Board Certified in Clinical Neuropsychology  [facility] - Department of Neurology

## 2018-11-28 NOTE — PROGRESS NOTES
OUTPATIENT NEUROLOGICAL REHABILITATION  SPEECH THERAPY EVALUATION  ALS MULTIDISCIPLINARY CLINIC    Date: 11/28/2018  Start Time:  1018  Stop Time:  1055      Procedure Min.   Motor Speech Evaluation    25    Swallowing and Oral Function Evaluation    12   Total Minutes: 37  Total Untimed Units: 2  Charges Billed/# of units: 2    ALS Clinic: 1  Authorization Period: Eval Only   Plan of Care Expiration: Eval Only     History   Onset Date: Symptoms began March 2018, Diagnosed October 25, 2018  Medical Diagnosis:  Amyotrophic Lateral Sclerosis    Treatment Diagnosis:    Encounter Diagnoses   Name Primary?    ALS (amyotrophic lateral sclerosis) Yes    Chronic neuromuscular respiratory failure     Impaired mobility and ADLs     Dysarthria     Dysphagia, unspecified type      Physician: Dr. Elaine  Orders: Ambulatory referral for Speech therapy evaluate and treat; Evaluation only  Past Medical History:   Past Medical History:   Diagnosis Date    Abnormal Pap smear     repeats were normal    Breast disorder     benign left breast biopsy    GERD (gastroesophageal reflux disease)     Hay fever     Hyperlipidemia     Seizures 02/1960    after giving birth, none since     History of Present Illness: Arianne Johnson  presents to the [facility] secondary to the diagnosis of ALS Disease. Pt reported that her voice has been more raspy, however, she stated it could be due to post-nasal drip. She has difficulty getting her words out. Ms. Johnson stated that she frequently coughs after drinking water, and she began taking smaller bites. Her daughter, son, and granddaughter were present today.     Subjective   Functional Deficits Leading to Referral/Nature of Injury:  Progressive muscle weakness.  Precautions:  Aspiration precautions, fall   Prior Therapy:  None                      Home Health Therapy at present time: No  Pain: 0 /10  Respiratory Status: Pt  "reported she is getting a cough assist machine. She does daily breathing treatments.    Nutrition:  regular with thin liquids  Environmental Concerns/Cultural/Spiritual/Developmental/Educational Needs: None  Social History:  Pt's daughter and son in law just moved in with her.   Chief Complaint: Speech and swallowing.   Changes of note from last ALS clinic include: 1st clinic.    Objective   Pt's motor speech, fluency, and voice were informally assessed. OME performed within Cranial Nerve Assessment detailed below. Motor speech skills were assessed and were functional for daily communication with a variety of communication partners (i.e. Family, friends, medical personnel). Pt presents with harsh vocal quality, short phrases and inadequate breath- speech coordination  in  known contexts, with familiar listeners and with unfamiliar listeners. Diadochokinetic (DDK) rates for rapid repetition of 1 - 3 syllable utterances were not WFL. Maximum Phonation Time was not WFL. PT sustained a vowel for 10.6 seconds (norm 15-25 for females, 25-35 for males). Pt's voice was informally assessed and judged to be impaired - minimum. Vocal quality was strain-strangled vocal quality, hoarseness  and inadequate breath-speech coordination in most contexts,"known contexts,  with familiar listeners and with unfamiliar listeners. Pt exhibited a decreased speaking rate, 121 WPM (norm >125). An s:z ratio was obtained, pt sustained an /s/ for 8s, and /z/ for 2s, yielding a ratio of 2. This is indicative of a possible laryngeal impairment. Vocal intensity was reduced mildly. Overall speech intelligibility was judged to be good but it declines as she fatigues and speaks longer utterances. Changes of note from last ALS clinic include: 1st clinic.      # reps/ 5s Norms  Reps per s Maximum Phon. Time (ah) Words Per Minute: S:Z Ratio:   P^ 15  5.0-7.1 3  Trial 1  6s 121/  1 minute  = 121   #words/  minute S- 8s    T^ 18  4.8-7.1 3.6  Trial " "2  11s >125 = WFL Z- 4s     K^ 14  4.4-7.4 2.8  Trial 3  15s <125 = refer for AAC eval Ratio:   8  /4  = 2    P^T^K^ 5  3.6-7.5 1  Avg:  10.6s           Norm: 160-170  (paragraph reading) >1= laryngeal impairment       Norm (F 10-26, M 13-34.6) Norm: 150 to 250 (norm conversation) <1= respiratory impairment     Respiration / Phonation:    Activity Stimulus Quality Duration Loudness Steadiness   Phonation Sustained ah:    Singing up scale:    Counting 1 to highest # on one breath:   hoarse    strained     Pt could not sing up scale.    Pt counted 1 to 18 on one breath.  10.6 seconds    Mild WFL Varied pitch     Oral Reading [place] hoarse    strained 121 WPM    Mild WFL Varied pitch     Conversation   hoarse    strained  WFL WFL        Augmentative/Alternative Communication (AAC): Pt is not currently using an augmentative/ alternative communication device. She  would benefit from continued monitoring to determine need for a full AAC evaluation due to the progressive nature of the disease. Use of communication apps such as "Text to Speak" and "Verbally" were discussed and suggested. A voice amplifier was also discussed to help with vocal fatigue and reduced intensity.      Clinical Swallow Exam/ Pepe Mechanism Exam:  Oral Motor Exam:  Oral Musculature: Mild decreased strength and ROM  Structure Abnormalities:WFL  Dentition: present, adequate oral hygiene   Secretion Management: Absence of drooling   Mucosal Quality: Dry   Mandibular Strength and Mobility: WFL   Rest: WFL   Lateralization: WFL  Protrusion: WFL  Retraction:  WFL  Involuntary Movement: absent   Oral Labial Strength and Mobility: mild impairment c/b decreased ROM on left side   Rest: WFL   Pucker: WFL  Retraction: WFL  Alternating Pucker / Retraction: decreased ROM on left side   Involuntary Movement: absent   Lingual Strength and Mobility: mild impairment c/b decreased strength and ROM   Rest: WFL  Protrusion: decreased strength   Retraction: " WFL  Lateralization: decreased strength on left side   Involuntary Movement: absent   Velar Elevation: mild deviation of uvula  Rest: WFL  Symmetry: left side deviation of uvula   Elevation: WFL  Sustained Elevation: WFL  Involuntary Movement: absent   Buccal Strength and Mobility: reduced on L side   Volitional Cough: WFL   Volitional Swallow: WFL  Voice Prior to PO Intake: clear    Trigeminal Nerve (CN V): WFL    Facial Nerve (CN VII): Reduced ROM on L side upon retraction, indicative of possible nerve involvement.     Glossopharyngeal Nerve (IX) and Vagus (X): Uvula deviation to L side, indicative of possible nerve involvement.     Hypoglossal Nerve (XII): Decreased strength and ROM, more significant on L side upon lateralization, indicative of possible nerve involvement.     Facial, Glossopharyngeal, and Vagus Nerves: See above    [place] Swallow Protocol:  Pt failed gurmeet swallow protocol 2/2 s/s of aspiration, c/b delayed throat clear.   [place] Swallow Protocol dictates pt remain NPO if fail screener; (Jean-Pierrer et al. 2014) however, as objective swallow assessment is not available for greater than a week, pt will remain on current diet until objective assessment is completed unless otherwise indicated.     Clinical Swallow Examination:   Pt presented with:   THIN:- self regulated cup sip of water x2, self regulated straw sip of water x2    PUREE:- 1/2 tsp bite of apple sauce    SOLID: -bite of mimi cracker x1     DESCRIPTION: Oral Phase- Pt did not clear all the apple sauce from the spoon, indicative of labial weakness. There was mild residue following presentation of the mimi cracker. Pt also appeared to exhibit slowed A-P transit time. Mastication was coordinated and timely. Pharyngeal Phase- Upon palpation, hyolaryngeal elevation/excursion appeared inconsistently reduced and mildly delayed. Pt presented with audible swallows. She voluntarily produced a second swallow for apple sauce and thin liquid  consistencies. She exhibited a delayed throat clear following thin liquid via cup sips and straw sips, as well as following the apple sauce and mimi cracker each trial. Her voice became more raspy towards the end of the swallow assessment. An MBSS is recommended at this time to assess swallow function and determine the safest diet for the patient.     Hearing: Appeared to be within functional limits in conversation. Will monitor and refer as necessary.    Vision: Appeared to be within functional limits. Will monitor and refer as necessary.       Education   Patient and her  family were educated on the recommendations, motor speech strategies, AAC, speech generating devices (SGD), message banking and swallowing precautions. They verbalized understanding.     Assessment   Impressions: Mrs. Arianne Johnson exhibited a mixed spastic-flaccid dysarthria and oropharyngeal dysphagia  2/2 ALS. Her deficits were characterized by harsh vocal quality, slow rate of speech and inadequate breath- speech coordination  and strain-strangled vocal quality and varied pitch. Reduced phrase length during oral reading was evident.  Demonstrates impairments including limitations as described in the problem list. Positive prognostic factors include family support and patient motivation. Negative prognostic factors include progressive nature of disease progressive nature of the disease. Barriers to progress include complex medical history and progressive nature of disease progressive nature of the disease.     ALS Severity Scale:  Stage 2: Detectable Speech Disturbance:  Speech remains easily understood, but changes are noted by others, especially during fatigue or stress. ALS Severity Scale: 7 or 8.      JANET National Outcome Measurement System (NOMS) and Functional Communication Measure (FCM):   Severity Modifier for Medicare G-Code:  Motor Speech  Current status: FCM:  LEVEL 6: The individual is successfully able to communicate  intelligibly in most activities, but some limitations in intelligibility are still apparent in vocational, avocational, and social activities. The individual rarely requires minimal cueing to produce complex sentences/messages intelligibly. The individual usually uses compensatory strategies when encountering difficulty.   - CI at least 1% but less than 20% impaired, limited or restricted  Projected status:  FCM:   LEVEL 6: The individual is successfully able to communicate intelligibly in most activities, but some limitations in intelligibility are still apparent in vocational, avocational, and social activities. The individual rarely requires minimal cueing to produce complex sentences/messages intelligibly. The individual usually uses compensatory strategies when encountering difficulty.   - CI at least 1% but less than 20% impaired, limited or restricted  Discharge status:  FCM:   LEVEL 6: The individual is successfully able to communicate intelligibly in most activities, but some limitations in intelligibility are still apparent in vocational, avocational, and social activities. The individual rarely requires minimal cueing to produce complex sentences/messages intelligibly. The individual usually uses compensatory strategies when encountering difficulty.   -  CI at least 1% but less than 20% impaired, limited or restricted    Voice  Current status: FCM:  LEVEL 5: Voice occasionally sounds normal with self-monitoring, but there is some situational variation. The individual's ability to participate in vocational, avocational, and social activities requiring voice is rarely affected in low-vocal demand activities, but is occasionally affected in high-vocal demand activities.   - CJ at least 20% < 40% impaired, limited or restricted  Projected status:  FCM:  LEVEL 5: Voice occasionally sounds normal with self-monitoring, but there is some situational variation. The individual's ability to  participate in vocational, avocational, and social activities requiring voice is rarely affected in low-vocal demand activities, but is occasionally affected in high-vocal demand activities.   -  CJ at least 20% < 40% impaired, limited or restricted  Discharge status:  FCM:  LEVEL 5: Voice occasionally sounds normal with self-monitoring, but there is some situational variation. The individual's ability to participate in vocational, avocational, and social activities requiring voice is rarely affected in low-vocal demand activities, but is occasionally affected in high-vocal demand activities.   -  CJ at least 20% < 40% impaired, limited or restricted       Swallowing  Current status:  FCM:  LEVEL 6: Swallowing is safe, and the individual eats and drinks independently and may rarely require minimal cueing. The individual usually self-cues when difficulty occurs. May need to avoid specific food items (e.g., popcorn and nuts), or require additional time (due to dysphagia).   -  CI at least 1% but less than 20% impaired, limited or restricted  Projected status:  FCM:   LEVEL 6: Swallowing is safe, and the individual eats and drinks independently and may rarely require minimal cueing. The individual usually self-cues when difficulty occurs. May need to avoid specific food items (e.g., popcorn and nuts), or require additional time (due to dysphagia).   - CI at least 1% but less than 20% impaired, limited or restricted  Discharge status:  FCM:   LEVEL 6: Swallowing is safe, and the individual eats and drinks independently and may rarely require minimal cueing. The individual usually self-cues when difficulty occurs. May need to avoid specific food items (e.g., popcorn and nuts), or require additional time (due to dysphagia).   - CI at least 1% but less than 20% impaired, limited or restricted     Rehab Potential: fair to guarded     Plan    Recommended Treatment Plan: 1. MBSS  2. [facility] with  [facility] months    Plan of care expiration: Eval Only     Other Recommendations: STANTONS       JOSEPHINE Rivera  [facility]- Clinician    I certify that I was present in the room directing the student in service delivery and guiding them using my skilled judgment. As the co-signing therapist I have reviewed the students documentation and am responsible for the treatment, assessment, and plan.     VIOLETTE Cowan., CCC-SLP, CBIS  Speech-Language Pathologist  Date: 11/28/2018

## 2018-11-28 NOTE — PROGRESS NOTES
"CHIEF COMPLAINT:  Nutrition Counseling (ALS clinic - initial visit)    HISTORY OF PRESENT ILLNESS: Arianne Johnson is a 78 y.o. female with onset of shortness of breath ~ 6 mos ago, treated empirically for asthma without significant improvement.  Progressed to dyspnea with activities of daily living.  No history of primary tobacco (+ second hand smoke), no asthma, no occupational exposures to dusts or fumes.  No excessive saliva.  Hoarseness over past several months.  Swallowing small bites carefully, no gross aspiration of water.  Sinus congestion treated with bactrim/montelukast - persists with accumulation in throat.  15# weight loss compared with health.  Albuterol prn without benefit.  Onset of dz with 3/26/18 with right "foot slap".  R ankle edema, resolves overnight.      PAST MEDICAL HISTORY:    Past Medical History:   Diagnosis Date    Abnormal Pap smear     repeats were normal    Breast disorder     benign left breast biopsy    GERD (gastroesophageal reflux disease)     Hay fever     Hyperlipidemia     Seizures 02/1960    after giving birth, none since       PAST SURGICAL HISTORY:    Past Surgical History:   Procedure Laterality Date    APPENDECTOMY      BREAST BIOPSY Left     benign    COLONOSCOPY N/A 2/21/2017    Procedure: COLONOSCOPY;  Surgeon: Juni Devi MD;  Location: [facility];  Service: Endoscopy;  Laterality: N/A;    COLONOSCOPY N/A 2/21/2017    Performed by [facility]i [facility]i [facility] at [facility]    DILATION AND CURETTAGE OF UTERUS      TONSILLECTOMY, ADENOIDECTOMY      TOTAL ABDOMINAL HYSTERECTOMY         FAMILY HISTORY:                Family History   Problem Relation Age of Onset    Breast cancer Maternal Aunt     Cancer Daughter         adenocarcinoma of lung with mets to brain    Cancer Grandchild         epitheloid angiosarcoma    Ovarian cancer Neg Hx        SOCIAL HISTORY:          Occupation / Environment: no occupational exposures  Social support: lives in [place], "   recently, daughter Geena and  moved in to care for her.  Amira, Geena and ?clarence present.  Social History     Tobacco Use   Smoking Status Never Smoker   Smokeless Tobacco Never Used       ALLERGIES:    Review of patient's allergies indicates:   Allergen Reactions    Codeine Nausea Only    Demerol [meperidine]      nausea    Sulfa (sulfonamide antibiotics) Nausea Only       CURRENT MEDICATIONS:    Current Outpatient Medications   Medication Sig Dispense Refill    albuterol (PROVENTIL) 2.5 mg /3 mL (0.083 %) nebulizer solution USE 1 AMPULE PER NEBULIZER 3 TIMES DAILY  0    atorvastatin (LIPITOR) 40 MG tablet TAKE 1 TABLET(40 MG) BY MOUTH EVERY DAY 90 tablet 3    calcium-vitamin D3 500 mg(1,250mg) -200 unit per tablet Take 1 tablet by mouth 2 (two) times daily with meals.      fluticasone (FLONASE) 50 mcg/actuation nasal spray daily as needed.       ibandronate (BONIVA) 150 mg tablet TAKE 1 TABLET(150 MG) BY MOUTH EVERY 30 DAYS 1 tablet 11    meclizine (ANTIVERT) 25 mg tablet Take 1 tablet (25 mg total) by mouth 3 (three) times daily as needed. 30 tablet 3    montelukast (SINGULAIR) 10 mg tablet every evening.       omeprazole (PRILOSEC) 40 MG capsule TAKE 1 CAPSULE(40 MG) BY MOUTH EVERY DAY 30 capsule 11    PROAIR HFA 90 mcg/actuation inhaler daily as needed.       riluzole 50 mg Tab tablet Take 1 tablet (50 mg total) by mouth every 12 (twelve) hours. 60 tablet 11     No current facility-administered medications for this visit.                   REVIEW OF SYSTEMS:   Pulmonary related symptoms as per HPI.  Gen:  + weight loss, no fever, no night sweats  HEENT:  + sinus congestion, + dysphagia, + voice changes  CV:  no chest pain, no orthopnea, no paroxysmal nocturnal dyspnea  GI:  no melena, no hematochezia, no diarrhea, no constipation.  :  no dysuria, no hematuria, no incontinence  Neuro:  no syncope, + focal weakness, using cane 4 pt  Sleep:  rested     PHYSICAL EXAM:    Respiratory Rate: 22 NIV during clinic visit?  N  Vitals:    11/28/18 0755   Weight: 54.8 kg (120 lb 13 oz)   Height: 5' (1.524 m)   PainSc: 0-No pain     GENERAL:  Alert, calm, in no  distress  HEENT:  Normal pupils, normal conjunctiva, normal EOM, nasal and oral mucosa normal, tongue + fasciculations  NECK:  supple; no palpable lymphadenopathy or masses,no jugular venous distention.  CVS: regular rate and rhythm, no murmers, gallops or rubs  PULM: Grossly decreased vital capacity, normal to percussion and palpation, clear to auscultation bilaterally with no wheezes, crackles or rhonchi, + accessory muscle use with inspiratory effort  ABDOMEN:  soft nontender/nondistended, rise with inspiration, intact contraction with cough  EXTREMITIES no cyanosis, clubbing or edema  NEURO:  Focal weakness, ambulatory w cane    CONTRIBUTORY STUDIES:     PULMONARY FUNCTION TESTS: FVC 55%    O2 94%    ACTIVE PULMONARY PROBLEMS:    ICD-10-CM ICD-9-CM    1. ALS (amyotrophic lateral sclerosis) G12.21 335.20 CBC auto differential      Hepatic function panel   2. Chronic neuromuscular respiratory failure J96.10 518.83    3. Impaired mobility and ADLs Z74.09 799.89    4. Dysarthria R47.1 784.51    5. Dysphagia, unspecified type R13.10 787.20    6. Nutritional assessment Z00.8 V72.85        ASSESSMENT&PLAN:  1.  Neuromuscular respiratory muscle weakness, dyspnea on exertion, no orthopnea.  Will defer NIV for now  2.  Decreased cough efficacy, increased secretion burden - will benefit from cough assist +40 x 2 s, low inspiratory flow, -40 x 2 sec, and portable suction machine to remove excessive and deep oropharyngeal secretions.  3.  Sinus congestion - will add flonase and cetirizine   4.  Will cont to follow with Dr. Ward (pulmonary) for availability and continuity      No Follow-up on file.      [Greater than 50% of this 35 minute visit spent counseling patient and family]

## 2018-11-28 NOTE — PROGRESS NOTES
Name: Arianne Johnson  MRN: [id number]     Physician: Savannah Elaine DO    Diagnosis:   Encounter Diagnoses   Name Primary?    ALS (amyotrophic lateral sclerosis) Yes    Chronic neuromuscular respiratory failure     Impaired mobility and ADLs     Dysarthria     Dysphagia, unspecified type     Nutritional assessment         Onset date: Diagnosed on 10/25/2018    Orders: Eval and Treat    Subjective:  Patient goals: Improve dressing, opening containers, increase energy level  Chief Complaint:   Chief Complaint   Patient presents with    OT Initial Evaluation     ALS Clinic      Precautions: Standard and fall  Social Hx: Retired lives with her daughter and son-in-law    DME: Quad cane and grab bars in bathroom    Home access: 3 steps to enter a 1 story home. Bathroom is a tub shower and patient only takes baths.    Past treatment includes: no prior therapies    Pain: no pain reported today    Dominant hand: right    Occupation/hobbies/homemaking: TV, reading, shopping, games on tablet, [employer]  Job description includes: N/A  Driving status: Not currently driving    Objective  Cognitive Exam  Oriented: Person, Place, Time and Situation  Behaviors: Follows multistep  commands  Follows Commands/attention: Follows multistep  commands  Communication: clear/fluent  Memory: No Deficits noted  Safety awareness/insight to disability: Patient appears aware of diagnosis and insight into disability  Coping skills/emotional control: Appropriate to situation      Physical Exam:  Postural examination/scapula alignment: No abnormalities noted  Joint integrity: Firm end feeling  Skin integrity: Grossly intact  Edema: None noted  Palpation: N/A    Sensation: Arianne reports intact sensation  Light touch:  intact  Sharp/Dull:  intact  Kinesthesia: intact  Proprioception:  intact  Temperature:  intact    Joint evaluation:  WNL bilateral upper extremities    Strength:  RUE and LUE-  Shoulder adduction: 4  Pronation: 4  Wrist  flexion/extension: 4+  All other areas bilaterally: 5     Fine motor coordination: 9 hole peg test  Date 11/28/2018   R hand(secs) 30   L hand (secs) 35     Tone:  Modified Casandra Scale:   0 - No increase in muscle tone    Balance:   Static Sit: Good  Dynamic sit: Good    Functional Status: Mod I    Functional Mobility:  Bed mobility: I  Roll to left: I  Roll to right: I  Supine to sit: I  Sit to supine: I  Transfers to bed: I  Transfers to toilet: I  Car transfers: I  Wheelchair mobility: Patient is not currently using a wheelchair but has been given information regarding power mobility    ADL's:  Feeding: I  Grooming: I  Hygiene: I  UB Dressing: Mod I; difficulty with buttons  LB Dressing: Mod I; difficulty with buttons  Toileting: I  Bathing: Mod I; lowers self and exits bathtub using grab bars    IADL's:  Homecare: Max A  Cooking: Max A  Laundry: Max A  Yard work: D  Use of telephone: Mod I  Money management: Mod I  Medication management: Mod I  Comments: Patient c/o difficulty with tasks requiring manual dexterity such opening tight jars and difficulty accomplishing tasks that require heavier lifting such as household chores  DASH: 78/120= 65% perceived disability     TODAY'S TREATMENT  1. Education on energy conservation and work simplification strategies to improve her energy level during daily activities  2. Pt was provided educational information, including assistive devices like button aides for dressing tasks    ASSESSMENT:  OT diagnosis: decreased ability to perform ADLs such as dressing and IADLs such as cooking and homecare    Assessment  Arianne Johnson is a 78 y.o. female with a medical diagnosis of   Encounter Diagnoses   Name Primary?    ALS (amyotrophic lateral sclerosis) Yes    Chronic neuromuscular respiratory failure     Impaired mobility and ADLs     Dysarthria     Dysphagia, unspecified type     Nutritional assessment     referred to occupational therapy for diagnosis of ALS. She  presents with decreased endurance, decreased manual dexterity, decrease strength in bilateral UEs, decreased ability to complete ADLs/IADLs.    Arianne can benefit from home health Occupational therapy and a home program to address the stated goals to improve impairments and functional limitations. Treatment will be directed at improve the following impairments. Rehab potential is good due to motivation and supportive family .    PLAN:    Patient/caregiver understands and agrees with plan of care. OT HH to address energy conservation/work simplification strategies, assistive device use, and full body stretching program.  Profile and History Assessment of Occupational Performance Level of Clinical Decision Making Complexity Score   Occupational Profile:   Arianne Johnson is a 78 y.o. female who lives with their family and is currently retired.  Arianne Johnson has difficulty with  dressing  housework/household chores  affecting his/her daily functional abilities. His/her main goal for therapy is dressing and opening containers.     Comorbidities:   None known    Medical and Therapy History Review:   Brief               Performance Deficits    Physical:  Strength and endurance    Cognitive:  No Deficits    Psychosocial:    No Deficits     Clinical Decision Making:  low    Assessment Process:  Detailed Assessments    Modification/Need for Assistance:  Not Necessary    Intervention Selection:  Limited Treatment Options       low  Based on PMHX, co morbidities , data from assessments and functional level of assistance required with task and clinical presentation directly impacting function.

## 2018-11-28 NOTE — PROGRESS NOTES
Subjective:       Patient ID: Arianne Johnsno is a 78 y.o. female.    Chief Complaint:  ALS    History of Present Illness  HPI   Ms Johnson is a 77yo woman who began experiencing right foot drop in March of 2018, followed by progressive LE weakness without sensory changes. Also noticed bilateral hand weakness beginning in September as well.     EMG done at OSH demonstrated acute denervation in cranial, cervical, thoracic, and lumbar myotomes.   MRI brain/L spine non explanatory.     Paraneoplastic panel, SPEP/IMFIX, nl    She was told by Dr Elaine in October that she had ALS. She began taking riluzole and was given and RX for radicava. She starts infusions on Monday, December 3rd. They're going to begin with a PICC for 14days, and if tolerated, will move to a port.         Review of Systems  Review of Systems   Constitutional: Positive for activity change and unexpected weight change. Negative for appetite change, chills, diaphoresis, fatigue and fever.   HENT: Positive for trouble swallowing and voice change. Negative for congestion and drooling.    Eyes: Negative for photophobia and visual disturbance.   Respiratory: Negative for choking, shortness of breath and stridor.    Cardiovascular: Negative for chest pain and leg swelling.   Gastrointestinal: Negative for abdominal pain, constipation, diarrhea, nausea and vomiting.   Endocrine: Negative for cold intolerance and heat intolerance.   Genitourinary: Negative for difficulty urinating, dysuria and urgency.   Musculoskeletal: Positive for gait problem. Negative for back pain, myalgias, neck pain and neck stiffness.   Skin: Negative for color change and rash.   Allergic/Immunologic: Negative for environmental allergies and food allergies.   Neurological: Positive for weakness. Negative for dizziness, tremors, facial asymmetry, speech difficulty, numbness and headaches.   Psychiatric/Behavioral: Negative for confusion, decreased concentration and hallucinations. The  patient is not nervous/anxious.        Objective:      Neurologic Exam  Ht 5' (1.524 m)   Wt 54.8 kg (120 lb 13 oz)   SpO2 (!) 94%   BMI 23.59 kg/m²     Physical Exam    ALS Physical Exam PBA Speech Neck Flex HHD Neck Flex MMT Neck Ext HHD Neck Ext MMT Shd Abd R HHD   11/28/2018 No mild 15.7 4+ 20.8 4+ 9.8      ALS Physical Exam (Continued) Shd Abd R MMT Shd Abd L HHD Shd Abd L MMT Wrist Ext R HHD Wrist Ext R MMT Wrist Ext L HHD Wrist Ext L MMT   11/28/2018 4- 13.2 4 12.3 4 12.6 4      ALS Physical Exam (Continued) Hand  R (kg) Hand  L (kg) Hip Flex R HHD Hip Flex R MMT Hip Flex L HHD Hip Flex L MMT Knee Ext R HHD   11/28/2018 12 12 23.6 4 28.2 4+ 31      ALS Physical Exam (Continued) Knee Ext R MMT Knee Ext L HHD Knee Ext L MMT Foot DF R HHD Foot DF R MMT Foot DF L HHD Foot DF L MMT   11/28/2018 4+ 40.1 4+ 5.3 3 16.5 4      ALS Physical Exam (Continued) UMN Signs Arms Type UMN Signs Legs UMN Signs Legs Type   11/28/2018 spread from BB to fingers Yes crossed adduction     CV: no peripheral edema  HEENT: no cervical LAD    ALSFRS Score:  35  FRS-6 Score:  15                  Impression and Plan:     Problem List Items Addressed This Visit     ALS (amyotrophic lateral sclerosis) - Primary    Current Assessment & Plan     Ms Johnson is a 77yo with progressive weakness beginning this year.  Currently taking riluzole and interested in radicava. RX pending.    Maintain current therapy.     RTC 3 mos         Relevant Orders    CBC auto differential (Completed)    Hepatic function panel (Completed)    HME - OTHER    HME - OTHER    Fl Modified Barium Swallow Speech    SLP video swallow    Chronic neuromuscular respiratory failure    Impaired mobility and ADLs    Current Assessment & Plan     Will order HH OT for energy conservation techniques, stretching         Dysarthria    Dysphagia    Current Assessment & Plan     Will order MBSS         Relevant Orders    Fl Modified Barium Swallow Speech    SLP video swallow     Right foot drop    Fatigue      Other Visit Diagnoses     Nutritional assessment        Rhinosinusitis        Relevant Medications    cetirizine (ZYRTEC) 10 MG tablet    Cough        Relevant Orders    HME - OTHER    HME - OTHER

## 2018-11-30 NOTE — ASSESSMENT & PLAN NOTE
Ms Johnson is a 77yo with progressive weakness beginning this year.  Currently taking riluzole and interested in radicava. RX pending.    Maintain current therapy.     RTC 3 mos

## 2018-12-02 NOTE — PROGRESS NOTES
inform pt via phone that I reviewed the test results and note the following:    Bone mineral density shows increase in density but still shows osteoporosis. Cont taking boniva.

## 2018-12-03 NOTE — PROGRESS NOTES
MD orders received for MBSS/SLP Video Swallow Study.  Spoke with Sultana @ [facility] central scheduling [phone] regarding orders. Per Sultana, orders received. Sultana to reach out to pt/family to schedule time/date for MBSS/SLP VSS.     MD order received for cough assist and suction machine.  Confirmed with Jamal with [employer] [phone] (f) [phone], serviced [facility].  Pt face sheet, md order, md clinic notes faxed.  Dannielle confirmed receipt of info.  [employer] to reach out to pt/family to coordinate delivery time/date.          JANES COOK

## 2018-12-03 NOTE — TELEPHONE ENCOUNTER
Since Dr. Edwards ordered these, they are likely in his inbasket.  Thank you for letting me know.  The labs all look fine.

## 2018-12-10 NOTE — TELEPHONE ENCOUNTER
Call received from pt this morning inquiring about a back brace.  Pt reports she becomes for fatigue later and in the day and had a tendency to bend more and wanted a recommendation for back brace.            JANES COOK

## 2018-12-11 NOTE — TELEPHONE ENCOUNTER
Spoke with Mrs. Johnson who noted that the back fatigue is centralized from bra line to waist. Informed her of PT recommendations. Patient verbalized understanding.

## 2018-12-11 NOTE — TELEPHONE ENCOUNTER
The pt has 6 days left of her first Radicava infusion. She reports the only side effect she has experienced is increased weakness to BLE. This may or may not be related to medication but I felt it important to report before we move forward with more treatment. The pt has requested an order for port placement. Please advise.

## 2018-12-12 NOTE — TELEPHONE ENCOUNTER
The port has been ordered.  Lower limb weakness is not a common side effect.  Since most side effects of this medication are temporary, I would expect that when she is in the interval between treatments this will return to normal.  This side effect may even wear off.  I would advise that she also make sure she is able to get plenty of fluids.  Sometimes getting behind in water intake can make people feel weaker.  Since she has recently had an increase in the number of medications she is taking, she may need to flush her system a little more.  It doesn't appear to be metabolic, her labs look fine.

## 2019-01-01 ENCOUNTER — TELEPHONE (OUTPATIENT)
Dept: ADMINISTRATIVE | Facility: HOSPITAL | Age: 79
End: 2019-01-01

## 2019-01-01 ENCOUNTER — TELEPHONE (OUTPATIENT)
Dept: NEUROLOGY | Facility: CLINIC | Age: 79
End: 2019-01-01

## 2019-01-01 ENCOUNTER — LAB VISIT (OUTPATIENT)
Dept: LAB | Facility: HOSPITAL | Age: 79
End: 2019-01-01
Attending: PSYCHIATRY & NEUROLOGY
Payer: MEDICARE

## 2019-01-01 ENCOUNTER — TELEPHONE (OUTPATIENT)
Dept: ADMINISTRATIVE | Facility: CLINIC | Age: 79
End: 2019-01-01

## 2019-01-01 ENCOUNTER — OFFICE VISIT (OUTPATIENT)
Dept: NEUROLOGY | Facility: CLINIC | Age: 79
End: 2019-01-01
Payer: MEDICARE

## 2019-01-01 ENCOUNTER — SOCIAL WORK (OUTPATIENT)
Dept: NEUROLOGY | Facility: CLINIC | Age: 79
End: 2019-01-01

## 2019-01-01 ENCOUNTER — PATIENT MESSAGE (OUTPATIENT)
Dept: NEUROLOGY | Facility: CLINIC | Age: 79
End: 2019-01-01

## 2019-01-01 ENCOUNTER — PATIENT MESSAGE (OUTPATIENT)
Dept: FAMILY MEDICINE | Facility: CLINIC | Age: 79
End: 2019-01-01

## 2019-01-01 ENCOUNTER — TELEPHONE (OUTPATIENT)
Dept: HOME HEALTH SERVICES | Facility: HOSPITAL | Age: 79
End: 2019-01-01

## 2019-01-01 ENCOUNTER — TELEPHONE (OUTPATIENT)
Dept: FAMILY MEDICINE | Facility: CLINIC | Age: 79
End: 2019-01-01

## 2019-01-01 ENCOUNTER — TELEPHONE (OUTPATIENT)
Dept: PULMONOLOGY | Facility: CLINIC | Age: 79
End: 2019-01-01

## 2019-01-01 ENCOUNTER — OFFICE VISIT (OUTPATIENT)
Dept: FAMILY MEDICINE | Facility: CLINIC | Age: 79
End: 2019-01-01
Payer: MEDICARE

## 2019-01-01 ENCOUNTER — PES CALL (OUTPATIENT)
Dept: ADMINISTRATIVE | Facility: CLINIC | Age: 79
End: 2019-01-01

## 2019-01-01 ENCOUNTER — DOCUMENTATION ONLY (OUTPATIENT)
Dept: NUTRITION | Facility: CLINIC | Age: 79
End: 2019-01-01

## 2019-01-01 ENCOUNTER — EXTERNAL HOME HEALTH (OUTPATIENT)
Dept: HOME HEALTH SERVICES | Facility: HOSPITAL | Age: 79
End: 2019-01-01
Payer: MEDICARE

## 2019-01-01 VITALS
BODY MASS INDEX: 22.51 KG/M2 | WEIGHT: 114.63 LBS | SYSTOLIC BLOOD PRESSURE: 102 MMHG | DIASTOLIC BLOOD PRESSURE: 64 MMHG | RESPIRATION RATE: 14 BRPM | TEMPERATURE: 98 F | HEART RATE: 105 BPM | OXYGEN SATURATION: 95 % | HEIGHT: 60 IN

## 2019-01-01 VITALS
RESPIRATION RATE: 18 BRPM | HEART RATE: 110 BPM | DIASTOLIC BLOOD PRESSURE: 78 MMHG | WEIGHT: 111.69 LBS | SYSTOLIC BLOOD PRESSURE: 132 MMHG | BODY MASS INDEX: 21.81 KG/M2

## 2019-01-01 VITALS
SYSTOLIC BLOOD PRESSURE: 109 MMHG | OXYGEN SATURATION: 96 % | HEIGHT: 60 IN | HEART RATE: 109 BPM | BODY MASS INDEX: 21.12 KG/M2 | WEIGHT: 107.56 LBS | DIASTOLIC BLOOD PRESSURE: 64 MMHG

## 2019-01-01 VITALS — BODY MASS INDEX: 22.42 KG/M2 | WEIGHT: 114.19 LBS | OXYGEN SATURATION: 93 % | HEIGHT: 60 IN

## 2019-01-01 VITALS
WEIGHT: 118 LBS | HEART RATE: 97 BPM | DIASTOLIC BLOOD PRESSURE: 62 MMHG | BODY MASS INDEX: 23.16 KG/M2 | SYSTOLIC BLOOD PRESSURE: 123 MMHG | HEIGHT: 60 IN | RESPIRATION RATE: 16 BRPM

## 2019-01-01 DIAGNOSIS — M21.372 BILATERAL FOOT-DROP: ICD-10-CM

## 2019-01-01 DIAGNOSIS — M21.371 BILATERAL FOOT-DROP: ICD-10-CM

## 2019-01-01 DIAGNOSIS — G12.21 ALS (AMYOTROPHIC LATERAL SCLEROSIS): Primary | ICD-10-CM

## 2019-01-01 DIAGNOSIS — R13.10 DYSPHAGIA, UNSPECIFIED TYPE: ICD-10-CM

## 2019-01-01 DIAGNOSIS — R47.1 DYSARTHRIA: ICD-10-CM

## 2019-01-01 DIAGNOSIS — Z78.9 IMPAIRED MOBILITY AND ADLS: ICD-10-CM

## 2019-01-01 DIAGNOSIS — M51.36 DDD (DEGENERATIVE DISC DISEASE), LUMBAR: ICD-10-CM

## 2019-01-01 DIAGNOSIS — R26.89 IMPAIRED GAIT AND MOBILITY: ICD-10-CM

## 2019-01-01 DIAGNOSIS — Z74.09 IMPAIRED MOBILITY AND ADLS: ICD-10-CM

## 2019-01-01 DIAGNOSIS — R42 CHRONIC VERTIGO: ICD-10-CM

## 2019-01-01 DIAGNOSIS — R25.2 MUSCLE CRAMP: ICD-10-CM

## 2019-01-01 DIAGNOSIS — E78.2 MIXED HYPERLIPIDEMIA: Primary | ICD-10-CM

## 2019-01-01 DIAGNOSIS — M48.061 NEURAL FORAMINAL STENOSIS OF LUMBAR SPINE: ICD-10-CM

## 2019-01-01 DIAGNOSIS — E78.2 MIXED HYPERLIPIDEMIA: ICD-10-CM

## 2019-01-01 DIAGNOSIS — K11.7 INCREASED OROPHARYNGEAL SECRETIONS: Primary | ICD-10-CM

## 2019-01-01 DIAGNOSIS — M47.816 LUMBAR FACET ARTHROPATHY: ICD-10-CM

## 2019-01-01 DIAGNOSIS — M21.371 RIGHT FOOT DROP: Chronic | ICD-10-CM

## 2019-01-01 DIAGNOSIS — G12.21 ALS (AMYOTROPHIC LATERAL SCLEROSIS): ICD-10-CM

## 2019-01-01 DIAGNOSIS — R25.2 MUSCLE CRAMPS: ICD-10-CM

## 2019-01-01 DIAGNOSIS — J44.9 CHRONIC OBSTRUCTIVE PULMONARY DISEASE, UNSPECIFIED COPD TYPE: ICD-10-CM

## 2019-01-01 DIAGNOSIS — J99 NEUROMUSCULAR RESPIRATORY WEAKNESS: ICD-10-CM

## 2019-01-01 DIAGNOSIS — Z00.8 NUTRITIONAL ASSESSMENT: ICD-10-CM

## 2019-01-01 DIAGNOSIS — R29.898 WEAKNESS OF BACK: ICD-10-CM

## 2019-01-01 DIAGNOSIS — K21.9 GASTROESOPHAGEAL REFLUX DISEASE, ESOPHAGITIS PRESENCE NOT SPECIFIED: ICD-10-CM

## 2019-01-01 DIAGNOSIS — J40 BRONCHITIS: ICD-10-CM

## 2019-01-01 DIAGNOSIS — G70.9 NEUROMUSCULAR RESPIRATORY WEAKNESS: ICD-10-CM

## 2019-01-01 DIAGNOSIS — Z74.09 IMPAIRED FUNCTIONAL MOBILITY, BALANCE, GAIT, AND ENDURANCE: ICD-10-CM

## 2019-01-01 DIAGNOSIS — R26.89 ABNORMALITY OF GAIT DUE TO IMPAIRMENT OF BALANCE: ICD-10-CM

## 2019-01-01 LAB
ALBUMIN SERPL BCP-MCNC: 3.5 G/DL
ALBUMIN SERPL BCP-MCNC: 3.6 G/DL
ALBUMIN SERPL BCP-MCNC: 3.9 G/DL (ref 3.5–5.2)
ALP SERPL-CCNC: 85 U/L (ref 55–135)
ALP SERPL-CCNC: 92 U/L
ALP SERPL-CCNC: 97 U/L
ALT SERPL W/O P-5'-P-CCNC: 19 U/L
ALT SERPL W/O P-5'-P-CCNC: 19 U/L
ALT SERPL W/O P-5'-P-CCNC: 20 U/L (ref 10–44)
ANION GAP SERPL CALC-SCNC: 12 MMOL/L (ref 8–16)
ANION GAP SERPL CALC-SCNC: 7 MMOL/L
ANION GAP SERPL CALC-SCNC: 8 MMOL/L
AST SERPL-CCNC: 21 U/L
AST SERPL-CCNC: 21 U/L
AST SERPL-CCNC: 21 U/L (ref 10–40)
BASOPHILS # BLD AUTO: 0.02 K/UL (ref 0–0.2)
BASOPHILS # BLD AUTO: 0.03 K/UL
BASOPHILS # BLD AUTO: 0.04 K/UL
BASOPHILS NFR BLD: 0.2 % (ref 0–1.9)
BASOPHILS NFR BLD: 0.4 %
BASOPHILS NFR BLD: 0.5 %
BILIRUB SERPL-MCNC: 0.3 MG/DL
BILIRUB SERPL-MCNC: 0.3 MG/DL (ref 0.1–1)
BILIRUB SERPL-MCNC: 0.5 MG/DL
BUN SERPL-MCNC: 19 MG/DL
BUN SERPL-MCNC: 19 MG/DL
BUN SERPL-MCNC: 21 MG/DL (ref 8–23)
CALCIUM SERPL-MCNC: 9.7 MG/DL
CALCIUM SERPL-MCNC: 9.8 MG/DL
CALCIUM SERPL-MCNC: 9.8 MG/DL (ref 8.7–10.5)
CHLORIDE SERPL-SCNC: 100 MMOL/L
CHLORIDE SERPL-SCNC: 104 MMOL/L
CHLORIDE SERPL-SCNC: 97 MMOL/L (ref 95–110)
CHOL/HDLC RATIO: 2.7
CHOLEST SERPL-MSCNC: 140 MG/DL (ref 0–200)
CO2 SERPL-SCNC: 28 MMOL/L
CO2 SERPL-SCNC: 28 MMOL/L (ref 23–29)
CO2 SERPL-SCNC: 29 MMOL/L
CREAT SERPL-MCNC: 0.6 MG/DL (ref 0.5–1.4)
CREAT SERPL-MCNC: 0.7 MG/DL
CREAT SERPL-MCNC: 0.7 MG/DL
DIFFERENTIAL METHOD: ABNORMAL
DIFFERENTIAL METHOD: NORMAL
DIFFERENTIAL METHOD: NORMAL
EOSINOPHIL # BLD AUTO: 0.1 K/UL
EOSINOPHIL # BLD AUTO: 0.1 K/UL
EOSINOPHIL # BLD AUTO: 0.1 K/UL (ref 0–0.5)
EOSINOPHIL NFR BLD: 0.6 %
EOSINOPHIL NFR BLD: 0.7 %
EOSINOPHIL NFR BLD: 0.9 % (ref 0–8)
ERYTHROCYTE [DISTWIDTH] IN BLOOD BY AUTOMATED COUNT: 12.1 %
ERYTHROCYTE [DISTWIDTH] IN BLOOD BY AUTOMATED COUNT: 12.4 %
ERYTHROCYTE [DISTWIDTH] IN BLOOD BY AUTOMATED COUNT: 12.6 % (ref 11.5–14.5)
EST. GFR  (AFRICAN AMERICAN): >60 ML/MIN/1.73 M^2
EST. GFR  (NON AFRICAN AMERICAN): >60 ML/MIN/1.73 M^2
GLUCOSE SERPL-MCNC: 107 MG/DL
GLUCOSE SERPL-MCNC: 110 MG/DL (ref 70–110)
GLUCOSE SERPL-MCNC: 114 MG/DL
HCT VFR BLD AUTO: 39.1 %
HCT VFR BLD AUTO: 39.1 %
HCT VFR BLD AUTO: 42.9 % (ref 37–48.5)
HDLC SERPL-MCNC: 52 MG/DL
HGB BLD-MCNC: 12.4 G/DL
HGB BLD-MCNC: 12.8 G/DL
HGB BLD-MCNC: 13.8 G/DL (ref 12–16)
IMM GRANULOCYTES # BLD AUTO: 0.01 K/UL
IMM GRANULOCYTES # BLD AUTO: 0.02 K/UL
IMM GRANULOCYTES NFR BLD AUTO: 0.1 %
IMM GRANULOCYTES NFR BLD AUTO: 0.3 %
LDLC SERPL CALC-MCNC: 70 MG/DL (ref 0–160)
LYMPHOCYTES # BLD AUTO: 1.8 K/UL
LYMPHOCYTES # BLD AUTO: 2 K/UL
LYMPHOCYTES # BLD AUTO: 2.3 K/UL (ref 1–4.8)
LYMPHOCYTES NFR BLD: 21.8 % (ref 18–48)
LYMPHOCYTES NFR BLD: 23.6 %
LYMPHOCYTES NFR BLD: 24.3 %
MCH RBC QN AUTO: 30 PG
MCH RBC QN AUTO: 30.3 PG
MCH RBC QN AUTO: 30.8 PG (ref 27–31)
MCHC RBC AUTO-ENTMCNC: 31.7 G/DL
MCHC RBC AUTO-ENTMCNC: 32.2 G/DL (ref 32–36)
MCHC RBC AUTO-ENTMCNC: 32.7 G/DL
MCV RBC AUTO: 93 FL
MCV RBC AUTO: 94 FL
MCV RBC AUTO: 96 FL (ref 82–98)
MONOCYTES # BLD AUTO: 0.7 K/UL
MONOCYTES # BLD AUTO: 0.7 K/UL
MONOCYTES # BLD AUTO: 0.8 K/UL (ref 0.3–1)
MONOCYTES NFR BLD: 7.8 % (ref 4–15)
MONOCYTES NFR BLD: 8.2 %
MONOCYTES NFR BLD: 9 %
NEUTROPHILS # BLD AUTO: 5.1 K/UL
NEUTROPHILS # BLD AUTO: 5.3 K/UL
NEUTROPHILS # BLD AUTO: 7.3 K/UL (ref 1.8–7.7)
NEUTROPHILS NFR BLD: 66 %
NEUTROPHILS NFR BLD: 66.3 %
NEUTROPHILS NFR BLD: 69.3 % (ref 38–73)
NON HDL CHOL. (LDL+VLDL): 88
NRBC BLD-RTO: 0 /100 WBC
NRBC BLD-RTO: 0 /100 WBC
PLATELET # BLD AUTO: 213 K/UL
PLATELET # BLD AUTO: 230 K/UL
PLATELET # BLD AUTO: 248 K/UL (ref 150–350)
PMV BLD AUTO: 10.5 FL (ref 9.2–12.9)
PMV BLD AUTO: 10.6 FL
PMV BLD AUTO: 11 FL
POTASSIUM SERPL-SCNC: 4.7 MMOL/L
POTASSIUM SERPL-SCNC: 4.8 MMOL/L (ref 3.5–5.1)
POTASSIUM SERPL-SCNC: 5.2 MMOL/L
PROT SERPL-MCNC: 6.6 G/DL
PROT SERPL-MCNC: 7.1 G/DL
PROT SERPL-MCNC: 7.3 G/DL (ref 6–8.4)
RBC # BLD AUTO: 4.14 M/UL
RBC # BLD AUTO: 4.22 M/UL
RBC # BLD AUTO: 4.48 M/UL (ref 4–5.4)
SODIUM SERPL-SCNC: 135 MMOL/L
SODIUM SERPL-SCNC: 137 MMOL/L (ref 136–145)
SODIUM SERPL-SCNC: 141 MMOL/L
TRIGL SERPL-MCNC: 92 MG/DL
WBC # BLD AUTO: 10.5 K/UL (ref 3.9–12.7)
WBC # BLD AUTO: 7.79 K/UL
WBC # BLD AUTO: 8.01 K/UL

## 2019-01-01 PROCEDURE — 36415 COLL VENOUS BLD VENIPUNCTURE: CPT | Mod: PO

## 2019-01-01 PROCEDURE — 85025 COMPLETE CBC W/AUTO DIFF WBC: CPT

## 2019-01-01 PROCEDURE — G8978 MOBILITY CURRENT STATUS: HCPCS | Mod: CK,PO

## 2019-01-01 PROCEDURE — 99214 PR OFFICE/OUTPT VISIT, EST, LEVL IV, 30-39 MIN: ICD-10-PCS | Mod: S$PBB,,, | Performed by: FAMILY MEDICINE

## 2019-01-01 PROCEDURE — 99999 PR PBB SHADOW E&M-EST. PATIENT-LVL IV: CPT | Mod: PBBFAC,,, | Performed by: PSYCHIATRY & NEUROLOGY

## 2019-01-01 PROCEDURE — G0179 PR HOME HEALTH MD RECERTIFICATION: ICD-10-PCS | Mod: ,,, | Performed by: PSYCHIATRY & NEUROLOGY

## 2019-01-01 PROCEDURE — 99213 PR OFFICE/OUTPT VISIT, EST, LEVL III, 20-29 MIN: ICD-10-PCS | Mod: S$PBB,,, | Performed by: PHYSICAL MEDICINE & REHABILITATION

## 2019-01-01 PROCEDURE — 99215 PR OFFICE/OUTPT VISIT, EST, LEVL V, 40-54 MIN: ICD-10-PCS | Mod: S$PBB,,, | Performed by: PSYCHIATRY & NEUROLOGY

## 2019-01-01 PROCEDURE — 99215 OFFICE O/P EST HI 40 MIN: CPT | Mod: S$PBB,,, | Performed by: PSYCHIATRY & NEUROLOGY

## 2019-01-01 PROCEDURE — 99999 PR PBB SHADOW E&M-EST. PATIENT-LVL III: CPT | Mod: PBBFAC,,, | Performed by: FAMILY MEDICINE

## 2019-01-01 PROCEDURE — 99214 OFFICE O/P EST MOD 30 MIN: CPT | Mod: S$PBB,,, | Performed by: INTERNAL MEDICINE

## 2019-01-01 PROCEDURE — 99213 OFFICE O/P EST LOW 20 MIN: CPT | Mod: S$PBB,,, | Performed by: PHYSICAL MEDICINE & REHABILITATION

## 2019-01-01 PROCEDURE — 99214 OFFICE O/P EST MOD 30 MIN: CPT | Mod: S$PBB,,, | Performed by: FAMILY MEDICINE

## 2019-01-01 PROCEDURE — 99203 PR OFFICE/OUTPT VISIT, NEW, LEVL III, 30-44 MIN: ICD-10-PCS | Mod: S$PBB,,, | Performed by: PHYSICAL MEDICINE & REHABILITATION

## 2019-01-01 PROCEDURE — 99214 OFFICE O/P EST MOD 30 MIN: CPT | Mod: PBBFAC,PN | Performed by: PSYCHIATRY & NEUROLOGY

## 2019-01-01 PROCEDURE — 99999 PR PBB SHADOW E&M-EST. PATIENT-LVL III: ICD-10-PCS | Mod: PBBFAC,,,

## 2019-01-01 PROCEDURE — 99203 OFFICE O/P NEW LOW 30 MIN: CPT | Mod: S$PBB,,, | Performed by: PHYSICAL MEDICINE & REHABILITATION

## 2019-01-01 PROCEDURE — 99213 OFFICE O/P EST LOW 20 MIN: CPT | Mod: PBBFAC

## 2019-01-01 PROCEDURE — G0179 MD RECERTIFICATION HHA PT: HCPCS | Mod: ,,, | Performed by: PSYCHIATRY & NEUROLOGY

## 2019-01-01 PROCEDURE — 99999 PR PBB SHADOW E&M-EST. PATIENT-LVL III: CPT | Mod: PBBFAC,,,

## 2019-01-01 PROCEDURE — 99999 PR PBB SHADOW E&M-EST. PATIENT-LVL IV: ICD-10-PCS | Mod: PBBFAC,,, | Performed by: PSYCHIATRY & NEUROLOGY

## 2019-01-01 PROCEDURE — 92610 EVALUATE SWALLOWING FUNCTION: CPT | Mod: PO

## 2019-01-01 PROCEDURE — 80053 COMPREHEN METABOLIC PANEL: CPT

## 2019-01-01 PROCEDURE — 92522 EVALUATE SPEECH PRODUCTION: CPT | Mod: PO

## 2019-01-01 PROCEDURE — 99214 PR OFFICE/OUTPT VISIT, EST, LEVL IV, 30-39 MIN: ICD-10-PCS | Mod: S$PBB,,, | Performed by: INTERNAL MEDICINE

## 2019-01-01 PROCEDURE — 99213 OFFICE O/P EST LOW 20 MIN: CPT | Mod: PBBFAC,PO | Performed by: FAMILY MEDICINE

## 2019-01-01 PROCEDURE — 97164 PT RE-EVAL EST PLAN CARE: CPT | Mod: PO

## 2019-01-01 PROCEDURE — 97165 OT EVAL LOW COMPLEX 30 MIN: CPT | Mod: PO

## 2019-01-01 PROCEDURE — G8980 MOBILITY D/C STATUS: HCPCS | Mod: CK,PO

## 2019-01-01 PROCEDURE — G8979 MOBILITY GOAL STATUS: HCPCS | Mod: CK,PO

## 2019-01-01 PROCEDURE — 36415 COLL VENOUS BLD VENIPUNCTURE: CPT

## 2019-01-01 PROCEDURE — 99999 PR PBB SHADOW E&M-EST. PATIENT-LVL III: ICD-10-PCS | Mod: PBBFAC,,, | Performed by: FAMILY MEDICINE

## 2019-01-01 PROCEDURE — 99213 OFFICE O/P EST LOW 20 MIN: CPT | Mod: PBBFAC,25

## 2019-01-01 RX ORDER — ATORVASTATIN CALCIUM 40 MG/1
TABLET, FILM COATED ORAL
Qty: 90 TABLET | Refills: 0 | Status: SHIPPED | OUTPATIENT
Start: 2019-01-01 | End: 2019-01-01 | Stop reason: SDUPTHER

## 2019-01-01 RX ORDER — MECLIZINE HYDROCHLORIDE 25 MG/1
25 TABLET ORAL 3 TIMES DAILY PRN
Qty: 30 TABLET | Refills: 3 | Status: SHIPPED | OUTPATIENT
Start: 2019-01-01 | End: 2019-01-01 | Stop reason: SDUPTHER

## 2019-01-01 RX ORDER — BENZONATATE 200 MG/1
200 CAPSULE ORAL 2 TIMES DAILY PRN
Qty: 20 CAPSULE | Refills: 0 | Status: SHIPPED | OUTPATIENT
Start: 2019-01-01 | End: 2019-01-01 | Stop reason: SDUPTHER

## 2019-01-01 RX ORDER — OMEPRAZOLE 40 MG/1
CAPSULE, DELAYED RELEASE ORAL
Qty: 30 CAPSULE | Refills: 11 | Status: SHIPPED | OUTPATIENT
Start: 2019-01-01 | End: 2019-01-01

## 2019-01-01 RX ORDER — ATORVASTATIN CALCIUM 40 MG/1
TABLET, FILM COATED ORAL
Qty: 90 TABLET | Refills: 1 | Status: SHIPPED | OUTPATIENT
Start: 2019-01-01 | End: 2019-01-01 | Stop reason: SDUPTHER

## 2019-01-01 RX ORDER — GLYCOPYRROLATE 1 MG/1
1 TABLET ORAL EVERY 8 HOURS PRN
Qty: 90 TABLET | Refills: 11 | Status: SHIPPED | OUTPATIENT
Start: 2019-01-01 | End: 2020-07-10

## 2019-01-01 RX ORDER — BENZONATATE 200 MG/1
200 CAPSULE ORAL 2 TIMES DAILY PRN
Qty: 20 CAPSULE | Refills: 0 | Status: SHIPPED | OUTPATIENT
Start: 2019-01-01 | End: 2019-01-01

## 2019-01-01 RX ORDER — ALBUTEROL SULFATE 1.25 MG/3ML
1.25 SOLUTION RESPIRATORY (INHALATION) EVERY 6 HOURS PRN
Qty: 1 BOX | Refills: 5 | Status: SHIPPED | OUTPATIENT
Start: 2019-01-01 | End: 2020-04-09

## 2019-01-01 RX ORDER — BENZONATATE 200 MG/1
CAPSULE ORAL
Qty: 60 CAPSULE | Refills: 0 | Status: SHIPPED | OUTPATIENT
Start: 2019-01-01 | End: 2019-01-01 | Stop reason: SDUPTHER

## 2019-01-08 NOTE — TELEPHONE ENCOUNTER
The pt reports she has weakness to her legs during Radicava treatment. Pt isn't sure if it is the progression of the disease or a side effect of the medication, please advise.

## 2019-01-14 NOTE — TELEPHONE ENCOUNTER
Call received from pt inquiring about the company that was to provider the suction machine and cough assist machine.  Per Epic review, provider is [employer] [phone] (f) [phone]/orders were faxed on 12/3/18.  Pt further explained when the equipment was initially ordered, she declined it, now she is ready for the equipment.  Annmarie hdz/ [employer] made aware of above.  Annmarie to have RT to reach out to pt on today.  Pt made aware of same and in agreement.      JANES COOK

## 2019-01-24 NOTE — TELEPHONE ENCOUNTER
Spoke with Nora at [facility], informed her Dr Elaine has ordered to Hold Radicava at this time to see if the pt's weakness improves. Order for Home Health, Pt/OTfaxed to [facility].

## 2019-01-24 NOTE — PROGRESS NOTES
"NEUROLOGY  Neuromuscular and ALS Clinic  [facility]  [address] [place] [address]  [phone] (office) / [phone] (fax)    Patient Name:  Arianne Johnson  :  1940  MR #:  [id number]  Acct #:  [id number]    Date of Clinic Follow Up: 2019  Name of Neurologist: Savannah Elaine D.O, ABPN, AOBNP, ABEM    Other Physicians:  Chiki Paniagua MD (Primary Care Physician); No ref. provider found (Referring)      Chief Complaint: ALS      History of Present Illness (HPI):  Arianne Johnson is a 78 y.o. female here today for follow up of motor neuron disease.    She has had some dizziness when first getting out of bed in the morning.  She has a previous history of vertigo.  This sensation is in her head, but if she starts moving her head wants to go one way and her body another.  She will hold onto things to continue walking.  By the time she gets to her destination it has passed.  She will get nausea with this and feels like she could lose her balance. She does not feel like she could pass out.      Here with:  Daughter    Strength:  Patient states that when she gets her Radicava infusions her legs will feel extremely weak.  They will feel like they weigh "100 pounds" a piece.  During the 2 weeks off she will feel a little better.  She feels that her walking has changed. She has been off Radicava for almost 14 days.  She states she currently feels like her legs are extremely weak from the knee down.  She states she can no longer walk without a cane.  She feels her back is tired and weak due to bending over when walking.    Dysarthria:  She is getting more raspy.  Dr. Castorena put her back on Zyrtec due to concerns for post nasal drip, but he voice changes have persisted.    Dysphagia: She will get coughing spells spontaneously.  Eating and drinking do not induce cough or throat clearing.      Sialorrhea:  She denies     Constipation:  She denies    Sleep:  She sleeps " well    Gait:  She finds that when she walks she is starting to lean forward more.  The more fatigued she is the worse it gets.  She has tried a posture brace but it is not helping.    Falls:  She has not fallen    Breathing:  May 2017 is when she first noticed shortness of breath while walking.  She also gets short of breath when bathing and dressing for the day.    Bladder:  She denies    Pain:  She denies    Mood:  She feels it is pretty good.      PBA:  She denies    Memory:  She denies      Treatment to date:    Rilutek  Radicava    Review of Systems:   General: Weight gain: No, Weight Loss: Yes, Fatigue: Yes,   Fever: No, Chills: No, Night Sweats: No, Insomnia: No, Excessive sleeping: No   Respiratory:  Cough: Yes, Shortness of Breath: Yes,   Wheezing: No, Excessive Snoring: No, Coughing up blood: No  Endocrine: Heat Intolerance: No, Cold Intolerance: No,   Excessive Thirst: No, Excessive Hunger: No,   Eyes:  Blurred Vision: No, Double Vision: No,   Light Sensitivity: No, Eye pain: No  Musculoskeletal: Muscle Aches/Pain: No, Joint Pain/Swelling: No, Muscle Cramps: Yes, Muscle Weakness: Yes, Neck Pain: No, Back Pain: No   Neurological: Difficulty Walking/Falls: Yes, Headache Migraine: No, Dizziness/Vertigo: Yes, Fainting: No, Difficulty with Speech: Yes, Weakness: Yes, Tingling/Numbness: No, Tremors: Yes, Memory Problems: No, Seizures: No, Difficulty Swallowing: No, Altered Taste: No.  Cardiovascular: Chest Pain: No, Shortness of Breath: Yes,   Palpitations: No,  Gastrointestinal: Nausea/Vomiting: No, Constipation: No, Diarrhea: No, Bloody Stools: No   Psych/Cog:  Depression: No, Anxiety: No, Hallucinations: No, Problems Concentrating: No  : Frequent Urination: No, Incontinence: No, Blood of Urine: No, Urinary Infections: No, Changes in Sex Drive: No   ENT:Hearing Loss: No, Earache: No, Ringing in Ears: No,   Facial Pain: No, Chronic Congestion: No   Immune: Seasonal Allergies: Yes, Hives and/or Rashes:  No  The remainder of the review of twelve body systems was reviewed and normal.    ALSFRS:  39      Past Medical, Surgical, Family & Social History:   Reviewed and updated.    Home Medications:     Current Outpatient Medications:     atorvastatin (LIPITOR) 40 MG tablet, TAKE 1 TABLET(40 MG) BY MOUTH EVERY DAY, Disp: 90 tablet, Rfl: 3    cetirizine (ZYRTEC) 10 MG tablet, Take 1 tablet (10 mg total) by mouth once daily., Disp: , Rfl: 11    edaravone (RADICAVA IV), Inject into the vein every 14 (fourteen) days., Disp: , Rfl:     fluticasone (FLONASE) 50 mcg/actuation nasal spray, daily as needed. , Disp: , Rfl:     ibandronate (BONIVA) 150 mg tablet, TAKE 1 TABLET(150 MG) BY MOUTH EVERY 30 DAYS, Disp: 1 tablet, Rfl: 11    meclizine (ANTIVERT) 25 mg tablet, Take 1 tablet (25 mg total) by mouth 3 (three) times daily as needed., Disp: 30 tablet, Rfl: 3    montelukast (SINGULAIR) 10 mg tablet, every evening. , Disp: , Rfl:     omeprazole (PRILOSEC) 40 MG capsule, TAKE 1 CAPSULE(40 MG) BY MOUTH EVERY DAY, Disp: 30 capsule, Rfl: 11    PROAIR HFA 90 mcg/actuation inhaler, daily as needed. , Disp: , Rfl:     riluzole 50 mg Tab tablet, Take 1 tablet (50 mg total) by mouth every 12 (twelve) hours., Disp: 60 tablet, Rfl: 11    HYDROcodone-acetaminophen (NORCO) 5-325 mg per tablet, Take 1 tablet by mouth every 6 (six) hours as needed for Pain., Disp: 10 tablet, Rfl: 0    Physical Examination:  /62 (BP Location: Right arm, Patient Position: Sitting, BP Method: Medium (Automatic))   Pulse 97   Resp 16   Ht 5' (1.524 m)   Wt 53.5 kg (118 lb)   BMI 23.05 kg/m²     GENERAL:  General appearance: Well, non-toxic appearing.  No apparent distress.  Respiratory:  Normal.  Extremities: Normal.    MENTAL STATUS:  Alertness, attention span & concentration: normal.  Language: normal.  Orientation to self, place & time:  normal.  Memory, recent & remote: normal.  Fund of knowledge: normal.     SPEECH:  Hoarse and  fluent.  Follows complex commands.     CRANIAL NERVES:  Cranial Nerves II-XII were examined.  II - Visual fields: normal.  III, IV, VI: PERRL, EOMI, No ptosis, No nystagmus.  V - Facial sensation: normal.  VII - Face symmetry & mobility: normal.  VIII - Hearing: normal.  IX, X - Palate: elevates on left more than right.  XI - Shoulder shrug: normal.  XII - Tongue protrusion: normal.     GROSS MOTOR:  Gait & station: slow, with normal width base, uses cane, no toe drag, steppage, or circumduction.  No scissoring.  Tone: normal.  Abnormal movements: none.  Finger-nose & Heel-knee-shin: normal.  Rapid alternating movements & drift: normal.  Romberg: absent     MUSCLE STRENGTH:      Frequently induced cramps.     Fascics Atrophy RIGHT      LEFT Atrophy Fascics       5 Neck Ext. 5           5 Neck Flex 5           5 Deltoids 5           5 Sh.Ext.Rot. 5           5 Sh.Int.Rot. 5           5 Biceps 5           5 Triceps 5           5 Forearm.Pr. 5           5 Wrist Ext. 5           5 Wrist Flex 5           5 Finger Ext. 5           5 Finger Flex 5           5 FPL 5           5 Inteross. 5                                           5- Iliopsoas 5           5 Hip Abduct 5           5 Hip Adduct 5           5- Quads 5           5- Hams 5           4 Dorsiflex 4+           5 Plantar Flex 5           4 Ankle Darius 4+           4 Ankle Invert 5-           4 Toe Ext. 5           5 Toe Flex 5                                          REFLEXES:     RIGHT Reflex    LEFT   2+ Biceps 2+   2+ Brachiorad. 2+   2+ Triceps 2+   ++ Pectoralis ++     Jaw Jerk     - Hutchison's -           2+ Patellar 2+   2+ Ankle 2+   - Suprapatellar -                   Down PLANTAR Down                  SENSORY:  Sharp touch: decreased sensation in hands and arms, more on the right than the left.  Decreased sensation in the lateral lower legs on both sides.  No stocking pattern.  Vibration: Normal throughout.    Diagnostic Data Reviewed:    Records from her  referring provider were reviewed.  She had been seen 3-4 months prior for bilateral lower extremity weakness.  Weakness was predominantly below the knees and worse on the right.  She had noted that she was dragging her foot while she was walking.  Her right leg seemed to be getting weaker in general.  She had had an MRI of the brain recently which was reportedly normal.        Component      Latest Ref Rng & Units 9/18/2018   CPK      20 - 180 U/L 147   Vitamin B12 Bind Capacity      800 - 2600 pg/mL 1119         EMG 9/25/18  Similar to recent electrodiagnostic testing of the bilateral lower extremities on 9/13/18, today's test results yielded significant findings.  There were signs of active axonal denervation seen in nearly all muscles sampled throughout the bilateral upper extremities.  Additionally, there were similar findings seen in the thoracic paraspinal musculature, as well as complex repetitive discharges seen in the tongue.  Overall, this appears to be a picture of a systemic neurologic disease process.        EMG 9/13/18  I reviewed her recent imaging consisting of MRIs of the lumbar spine and brain.  The studies relatively benign and cannot explain today's electrodiagnostic findings.  Further workup is required.  Brief neurologic exam today revealed bilateral Hutchison's reflexes.  At this point, we will have her return for EMG of the bilateral upper extremities.  I will also order a serum CK level.  We may consider neurology referral pending today's results.     MRI brain 7/25/18:          EXAMINATION:  MRI BRAIN W WO CONTRAST    CLINICAL HISTORY:  right lower ext weakness;.  Other symptoms and signs involving the musculoskeletal system    TECHNIQUE:  Multiplanar multisequence MR imaging of the brain was performed before and after the administration of 5 mL Gadavist intravenous contrast.  Diffusion-weighted imaging was performed.  ADC map was generated.    COMPARISON:  None.    FINDINGS:  Intracranial  compartment:    There is no acute intracranial abnormality.  There is minimal volume loss and nonspecific white matter change.  The brain is essentially normal for age.  There is no intracranial hemorrhage or mass/mass effect.  There are no regions of restricted diffusion to suggest acute infarction.  There is no hemorrhage.  There is no pathologic enhancement in the brain or its coverings.  There is no hydrocephalus or midline shift.  The basilar cisterns are open.  Flow voids indicating patency are present in the major vessels at the base of the brain.  The cerebellar tonsils are in normal position at the level of the foramen magnum.  Pituitary volume is decreased, not unexpected for age.  The orbits are grossly normal.    Skull/extracranial contents:    Marrow signal intensity is normal.  There is a left-sided gustavo bullosa.  There is trace mucosal thickening in the anterior ethmoid air cells.  Otherwise, the paranasal sinuses are clear.  There is a small inferior right mastoid effusion.  The left mastoid air cells are clear.       Impression         1. There is no acute abnormality.  There is minimal volume loss and nonspecific white matter change.  There is no intracranial hemorrhage, mass/mass effect, acute infarction or pathologic enhancement.  Please see above discussion            MRI lumbar spine 7/25/18:          EXAMINATION:  MRI LUMBAR SPINE WITHOUT CONTRAST    CLINICAL HISTORY:  worsening right foot drop; Foot drop, right foot    TECHNIQUE:  Multiplanar, multisequence MR images were acquired from the thoracolumbar junction to the sacrum without the administration of contrast.    COMPARISON:  None.    FINDINGS:  Vertebral column: The lumbar vertebral bodies maintain normal height and alignment.  There is mild degenerative endplate signal change anteriorly at the L2-3, L4-5 and L5-S1 levels where there is mild osteophyte formation.  Baseline marrow signal intensity is normal.  The discs are mildly  desiccated but there is no significant disc space narrowing.    Spinal canal, conus, epidural space: The spinal canal is developmentally normal.  The conus terminates at the level of L1 and is normal in contour and signal intensity.  There is no abnormal epidural mass or fluid collection.    Findings by level:    On the sagittal images, the T10-11 and T11-12 levels are normal.    T12-L1: Unremarkable.  There is no spinal canal or significant foraminal stenosis.    L1-2: Unremarkable.  There is no spinal canal or significant foraminal stenosis.    L2-3: There is very mild disc space narrowing.  There is minimal bulging of the annulus and mild facet joint arthropathy.  There is no spinal canal or significant foraminal stenosis.    L3-4: There is a mild diffuse disc bulge.  There is mild facet joint arthropathy.  There is minimal flattening of the ventral dural sac.  There is no spinal canal or significant foraminal stenosis.    L4-5: There is a mild diffuse disc bulge with mild-to-moderate facet joint arthropathy and ligamentum flavum thickening.  There is no spinal canal or significant foraminal stenosis.    L5-S1: There is a mild diffuse disc bulge with superimposed shallow broad central disc protrusion.  There is mild-to-moderate facet joint arthropathy.  There is no spinal stenosis.  There is mild-to-moderate bilateral foraminal stenosis.    Soft tissues, other: The prevertebral soft tissues are normal.  There is atherosclerosis of the aorta without focal aneurysmal dilatation.  There is subtle abnormal signal within the gallbladder which could be artifactual represent volume averaging.  Alternatively, gallstones could be present.  Correlate clinically.       Impression         1. There is relatively mild multilevel degenerative change in the lumbar spine.  There is no fracture or malalignment.  There is mild-to-moderate bilateral foraminal stenosis at the L5-S1 level.  Otherwise, there may be mild degenerative  change but there is no spinal canal or significant foraminal stenosis at the remainder of the lumbar levels.  2. Question cholelithiasis.  There is nonspecific signal in the gallbladder which could be artifactual or represent volume averaging.         Component      Latest Ref Rng & Units 10/8/2018   NMO Interpretive Comments       SEE BELOW   PAVAL TUTU-1, Serum      <1:240 titer Negative   PAVAL reflex test added       None.   PAVAL TUTU-2, Serum      <1:240 titer Negative   PAVAL TUTU-3, Serum      <1:240 titer Negative   PAVAL AGNA-1, Serum      <1:240 titer Negative   PAVAL, PCA-1, Serum      <1:240 titer Negative   PAVAL, PCA-2, Serum      <1:240 titer Negative   PAVAL, PCA-Tr, Serum      <1:240 titer Negative   PAVAL,  Amphiphysin Ab, Serum      <1:240 titer Negative   CRMP-5 IgG      <1:240 titer Negative   Striated Muscle Ab      <1:120 titer Negative   P/Q Type Calcium Channel Ab      <=0.02 nmol/L 0.00   N-Type Calcium Channel Ab      <=0.03 nmol/L 0.02   AChR Binding Ab, Serum      <=0.02 nmol/L 0.00   AChR Ganglionic Neuronal Ab      <=0.02 nmol/L 0.00   Neuronal (V-G) K+ Channel Ab, Serum      <=0.02 nmol/L 0.00   IgG Monos, Gm2      <=1:500 <1:250   IgM Monos, Gm2      <=1:1000 <1:250   IgG Asialo, Gm2      <=1:4000 <1:250   IgM Asialo, Gm2      <=1:4000 <1:250   IgG Disialo, Gd1B      <=1:1000 <1:250   IgM Disialo, Gd1B      <=1:1000 <1:250   Gangliioside Ab Comment       Test Not Performed   Protein, Serum      6.0 - 8.4 g/dL 6.7   Albumin grams/dl      3.35 - 5.55 g/dL 3.70   Alpha-1 grams/dl      0.17 - 0.41 g/dL 0.36   Alpha-2 grams/dl      0.43 - 0.99 g/dL 0.96   Beta grams/dl      0.50 - 1.10 g/dL 0.94   Gamma grams/dl      0.67 - 1.58 g/dL 0.74   Immunofix Interp.       SEE COMMENT   Sed Rate      0 - 20 mm/Hr 25 (H)   Angio Convert Enzyme      8 - 53 U/L 28   Pathologist Interpretation LIVE       REVIEWED   Pathologist Interpretation SPE       REVIEWED          Assessment and Plan:  Arianne VILLAFANA  Elizabeth is a 78 y.o., right handed woman with hoarse voice, hyperreflexia, right lower extremity foot drop and an abnormal EMG. She has been diagnosed with ALS.  Supportive evidence includes a compelling EMG, negative bloodwork, and a mix of upper and lower motor neuron findings on clinical exam.  She has been tolerating Rilutek, but feels that the Radicava is making her feel weaker.  It is unclear if this is really the medication or the disease, but she definitely reports a waxing and waning pattern that is more suggestive of a side effect patter.  She has been advised to stop her Radicava for a couple months to see if this is really a side effect or just disease progression.      She is having increasing difficulty ambulating due to back weakness and bilateral foot drop.  She would benefit from PT to assist with evaluation of a walker versus rollater as well as other ambulating assistance.  Home Health will be ordered to also assist with her impaired functional mobility and endurance.    She has chronic vertigo, unrelated to her ALS.  She can continue to use meclizine as needed.  She should remain hydrated to avoid exacerbating this.  She can follow up with ENT if it gets bad and therapy can help with vestibular maneuvers if needed.      She will follow up in ALS clinic.      Time Spent:  40 minutes spent face-to-face, >50% spent advising about: medications, side effects, management and plan    This note was created with voice recognition software.  Grammatical, syntax and spelling errors may be inevitable.        Savannah Elaine D.O, ABPN, AOBNP, ABEM

## 2019-01-24 NOTE — TELEPHONE ENCOUNTER
Notified Irene Curtis with [facility] that pt prefers to use a different company for her home health needs; verbalized understanding.

## 2019-01-24 NOTE — TELEPHONE ENCOUNTER
ERIKAM to inform patient of existing ALS Clinic appt on 2/20/19 (8AM-12PM). Mrs. Johnson's 2nd ALS Clinic Visit.

## 2019-01-24 NOTE — TELEPHONE ENCOUNTER
Dr Elaine would like to schedule this pt in 1 month for ALS clinic. Will you please call the pt to schedule.

## 2019-02-06 NOTE — TELEPHONE ENCOUNTER
Pt called and rescheduled per request. Verbalized understanding of appointment information verified with verbal feed back. No further needs._JP

## 2019-02-06 NOTE — TELEPHONE ENCOUNTER
Home Health SOC 01/26/2019 - 03/26/2019 with [facility] ([place]) - Dr Savannah Elaine. Patient received PT and OT services.

## 2019-02-20 NOTE — TELEPHONE ENCOUNTER
Outgoing call to patient re: ALS Clinic appt scheduled for today, 2/20/19. Patient noted that she did not want to travel due to possible inclement weather. (Appt r/s to 4/10/19)    Mrs. Acuña notes that she has her good and bad days. The bad days consist of lower extremity weakness. Dr. Elaine ordered rollator that patient utilizes and feels is helpful.     Experiencing difficulty speaking at times, voice getting weaker. Noted patient voice is hoarse and constantly clearing throat during phone call.     Would like to speak with Dr. Elaine to follow up on the Radicava Infusions that were placed on hold.     Patient verbalized no other needs at this time.

## 2019-02-28 NOTE — PROGRESS NOTES
Refill Authorization Note     is requesting a refill authorization.    Brief assessment and rationale for refill: APPROVE; Needs labs   Name and strength of medication: ATORVASTATIN 40MG TABLETS  Medication-related problems identified: Requires labs    Medication Therapy Plan: HLD lco OV 5/2018, lipid lab ordered then but not done; NTBS (Lipid); approve 3 more    Medication reconciliation completed: No              How patient will take medication: 1 po daily          Comments:   Lab Results   Component Value Date    CHOL 151 07/19/2017    CHOL 266 (H) 01/18/2017    CHOL 215 (H) 05/03/2013     Lab Results   Component Value Date    HDL 46 07/19/2017    HDL 46 01/18/2017    HDL 52 05/03/2013     Lab Results   Component Value Date    LDLCALC 80.8 07/19/2017    LDLCALC 175.8 (H) 01/18/2017    LDLCALC 145.0 05/03/2013     Lab Results   Component Value Date    TRIG 121 07/19/2017    TRIG 221 (H) 01/18/2017    TRIG 89 05/03/2013     Lab Results   Component Value Date    CHOLHDL 30.5 07/19/2017    CHOLHDL 17.3 (L) 01/18/2017    CHOLHDL 24.2 05/03/2013     APPOINTMENTS (past 12 m or future 3m authorizing provider)  LAST VISIT DATE  Chiki Paniagua MD 11/19/2018         NEXT VISIT DATE  Chiki Paniagua MD 3/28/2019

## 2019-02-28 NOTE — TELEPHONE ENCOUNTER
Pt called, she would like to have her lipid panel lab done at [facility]. A new order with external class pended to forward to external lab.  please review and advise. Thanks_JP

## 2019-03-01 NOTE — TELEPHONE ENCOUNTER
"Spoke with Ms. Johnson by phone.  We discussed how she was doing off Radicava.  She feels that her ups and downs are not as bad, so she would like to just stay off the medicine.  This is a sound decision.  Will need to make arrangements regarding her port.  For now she will need someone to flush it.  She was having to drive to [place] for her Radicava, but possibly we can find a home nurse to go to her to do just a flush.  Will discuss further with our clinic coordinator on resources for this.      She had an additional question:  The therapy she was receiving in the month of February has now been discontinued.  She felt this was of great benefit and is sorry it was stopped.  She would like to do this again.  She was reassured that we will do a new assessment at clinic in April and if there are further needs we will order therapy again.    Of note:  She is taking her "Dream Vacation" March 17 to the [facility].  She is really hoping she will be able to participate in all the activities she wants to check off her list.  She has already called ahead to the airline regarding assistance.  She was educated that there is information on [facility]'s website about traveling with ALS.  I will also see if our [facility] affiliate can reach out to her with any other concerns regarding travel.  "

## 2019-03-11 NOTE — TELEPHONE ENCOUNTER
Notified pt that lipid panel normal per Dr. Paniagua's instructions.  Lab was received from [facility].  Placed in box for scan.

## 2019-03-28 NOTE — PROGRESS NOTES
Subjective:       Patient ID: Arianne Johnson is a 78 y.o. female.    Chief Complaint: Amyotrophic Lateral Sclerosis ( ALS )    HPI       Was diagnosed with als on 10/25/18.  On riluzole. Using a seated walker.      Copd stable     gerd stable.         Review of Systems      Review of Systems   Constitutional: Negative for fever and chills.   HENT: Negative for hearing loss and neck stiffness.    Eyes: Negative for redness and itching.   Respiratory: Negative for cough and choking.    Cardiovascular: Negative for chest pain and leg swelling.  Abdomen: Negative for abdominal pain and blood in stool.   Genitourinary: Negative for dysuria and flank pain.   Musculoskeletal: Negative for back pain   Neurological: Negative for light-headedness and headaches.   Hematological: Negative for adenopathy.   Psychiatric/Behavioral: Negative for behavioral problems.     Objective:      Physical Exam   Constitutional: She appears well-developed.   HENT:   Head: Normocephalic and atraumatic.   Eyes: Pupils are equal, round, and reactive to light. Conjunctivae are normal.   Neck: Normal range of motion.   Cardiovascular: Normal rate and regular rhythm.   No murmur heard.  Pulmonary/Chest: Effort normal and breath sounds normal.   Lymphadenopathy:     She has no cervical adenopathy.       Assessment:       1. ALS (amyotrophic lateral sclerosis)    2. Gastroesophageal reflux disease, esophagitis presence not specified    3. Chronic obstructive pulmonary disease, unspecified COPD type        Plan:       ALS (amyotrophic lateral sclerosis)    Gastroesophageal reflux disease, esophagitis presence not specified    Chronic obstructive pulmonary disease, unspecified COPD type                Plan:  Cont current meds      Medication List with Changes/Refills   Current Medications    ATORVASTATIN (LIPITOR) 40 MG TABLET    TAKE 1 TABLET(40 MG) BY MOUTH EVERY DAY    CETIRIZINE (ZYRTEC) 10 MG TABLET    Take 1 tablet (10 mg total) by mouth once  daily.    FLUTICASONE (FLONASE) 50 MCG/ACTUATION NASAL SPRAY    daily as needed.     HYDROCODONE-ACETAMINOPHEN (NORCO) 5-325 MG PER TABLET    Take 1 tablet by mouth every 6 (six) hours as needed for Pain.    IBANDRONATE (BONIVA) 150 MG TABLET    TAKE 1 TABLET(150 MG) BY MOUTH EVERY 30 DAYS    MECLIZINE (ANTIVERT) 25 MG TABLET    Take 1 tablet (25 mg total) by mouth 3 (three) times daily as needed.    MONTELUKAST (SINGULAIR) 10 MG TABLET    every evening.     OMEPRAZOLE (PRILOSEC) 40 MG CAPSULE    TAKE 1 CAPSULE(40 MG) BY MOUTH EVERY DAY    PROAIR HFA 90 MCG/ACTUATION INHALER    daily as needed.     RILUZOLE 50 MG TAB TABLET    Take 1 tablet (50 mg total) by mouth every 12 (twelve) hours.   Discontinued Medications    EDARAVONE (RADICAVA IV)    Inject into the vein every 14 (fourteen) days.

## 2019-04-09 NOTE — TELEPHONE ENCOUNTER
Mrs. Johnson reports that she is doing fair but getting a little weaker. Will discuss with team patient desire to reinitiate in-home PT/OT via [facility]. Also, nurse to flush mediport that was used for [facility].     Inquired of patient's dream vacation to [facility]. Stated that she and daughter, Geena, had a wonderful trip. Mrs. Acuña was a little disappointed bc [place] Overall memorable mother daughter trip. Patient will bring pictures tomorrow for team to view.

## 2019-04-10 PROBLEM — R26.89 IMPAIRED GAIT AND MOBILITY: Status: ACTIVE | Noted: 2019-01-01

## 2019-04-10 NOTE — PROGRESS NOTES
Subjective:       Patient ID: Arianne Johnson is a 78 y.o. female.    Chief Complaint: Follow-up    HPI     HISTORY OF PRESENT ILLNESS:  Ms. Vaca is a 78-year-old white female presenting   to the ALS Clinic for followup.  She has a history of ALS with dysarthria,   dysphagia and upper and lower extremity weakness.  She has been seen by the   Physical Medicine Service for her pain management.    The patient had an MRI of the lumbar spine done in September 2018 that was   positive for mild multilevel degenerative changes with mild-to-moderate   bilateral neural foraminal stenosis at L5-S1.  However, the patient denies any   back pain or radicular pain.  She complains of neck stiffness.  It is almost   constant.  It is not usually painful.  It is aggravated sporadically.  She   complains of bilateral upper and lower extremity cramps.  They happen   sporadically and sometimes frequently during the day.  They are not usually   painful.  They usually improve by stretching her muscles.  She denies any   arthritic pain.            MS/HN  dd: 04/10/2019 10:35:29 (CDT)  td: 04/11/2019 00:32:14 (CDT)  Doc ID   #[id number]  Job ID #[id number]    CC:       Review of Systems   Constitutional: Positive for fatigue.   HENT: Positive for trouble swallowing.    Eyes: Negative for visual disturbance.   Respiratory: Positive for shortness of breath.    Cardiovascular: Negative for chest pain.   Gastrointestinal: Negative for nausea and vomiting.   Genitourinary: Negative for difficulty urinating.   Musculoskeletal: Positive for gait problem. Negative for back pain and neck pain.   Neurological: Positive for speech difficulty and weakness. Negative for dizziness and headaches.   Psychiatric/Behavioral: Negative for behavioral problems and sleep disturbance.       Objective:      Physical Exam   Constitutional: She is oriented to person, place, and time. She appears well-developed and well-nourished. No distress.   HENT:   Head: Normocephalic and  atraumatic.   Neck:   Mild decrease ROM.  -ve tenderness.   Musculoskeletal:   BUE:  ROM:   RUE: full.   LUE: full.  Strength:    RUE: 3+/5 at shoulder abduction, 4+ elbow flexion, 4+ elbow extension, 4+ hand .   LUE: 3+/5 at shoulder abduction, 4+ elbow flexion, 4+ elbow extension, 4+ hand .  Sensation to pinprick:   RUE: intact.   LUE: intact.  DTR:    RUE: +2 biceps, +1 triceps.   LUE:  +2 biceps, +1 triceps.      BLE:  ROM:     RLE: full.      LLE: full.   Knee crepitus.:     RLE: -ve.      LLE: -ve.   Strength:    RLE: 3/5 at hip flexion, 4 knee extension, 2 ankle DF, 4 PF.   LLE: 3+/5 at hip flexion, 4+ knee extension, 4 ankle DF, 4+ PF.  Sensation to pinprick:     RLE: intact.      LLE: intact.   DTR:     RLE: +2 knee, +1 ankle.    LLE: +2 knee, +1 ankle.  SLR (sitting):      RLE: -ve.      LLE: -ve.     -ve tenderness over lumbar spine.     Neurological: She is alert and oriented to person, place, and time.   Skin: Skin is warm.   Psychiatric: She has a normal mood and affect.   Vitals reviewed.      Assessment:       1. ALS (amyotrophic lateral sclerosis)    2. Impaired mobility and ADLs    3. Dysarthria    4. Dysphagia, unspecified type    5. Bilateral foot-drop    6. DDD (degenerative disc disease), lumbar    7. Lumbar facet arthropathy    8. Neural foraminal stenosis of lumbar spine        Plan:       - No significant pain.  - Muscle cramps are mild and infrequent, we will hold off on therapy with muscle relaxants (such as baclofen). She can call f symptoms worsen.  - Follow up in ALS clinic.

## 2019-04-10 NOTE — PATIENT INSTRUCTIONS
ALS Clinic Notes  Neurology:      Respiratory:      PMR:      Physical Therapy: PT home health ordered, continue communication with wheelchair vendor for power WC.       Occupational Therapy: OT Home Health ordered, evaluation for power wheelchair, info given for power bath tub lifts,       Speech Language Pathology: [facility] will contact you regarding a voice amplifier. Reflux precautions: avoid spicy or acidic foods, remain upright after meals for 60-90 minutes at least.       Nutrition:  Encouraged increased po intake to promote weight maintenance; prevent additional unintentional weight loss      :  will research [email or web address] Waiver program regarding any updated changes.           Goals of home health therapy for patients with ALS  A resource for patients provided by [facility]    These are general guidelines of what your home health therapist(s) should be addressing. Every therapy plan of care is individualized so please discuss these topics with your therapist(s) directly for what is most appropriate in your situation.     Home Health Physical and Occupational Therapy  - Develop a home stretching program - if not already established, ask your therapist(s) to teach you and a caregiver how to perform safe stretching for your arms and legs. This helps maintain your flexibility, prevents stiff joints and may also reduce pain and spasticity. Strength training beyond active range of motion or light resistance is not recommended and can damage your muscles more quickly.   - Transfer training - if you feel unsafe or are unsure about transfers (getting from bed to chair, in/out of the tub, on/off toilet, etc.) ask your therapist for training on the safest techniques. They may recommend equipment such as a sliding board, gait belt, or evan lift to keep you and caregivers safe. If you have a evan lift, ask them to show you how to safely place slings and operate  it.  - Home evaluation and equipment recommendations - your therapists have the benefit of seeing exactly how your home is set up. They can give recommendations on home modifications such as clearing clutter and throw rugs that are trip hazards, placing furniture so there are open walkways and the need for ramps or hand rails.   They may also make recommendations for equipment to improve your safety and independence at home. These may include but are not limited to: AFOs, walkers/rollators, custom power WC, sliding board, evan lift, bedside commode, tub bench, entrance ramps, resting hand splints, ADL adaptive equipment (button hooks, universal cuff, etc.). Some equipment may be covered by insurance and would need a doctors order - call the ALS clinic coordinator if you need help getting equipment ordered.   - Power Wheelchair Evaluations - if walking becomes unsafe or difficult (whether due to weakness, balance impairments or respiratory difficulties) it is recommended to get a custom power wheelchair. This requires a doctors order for insurance and a WC vendor will have to coordinate with your therapist to evaluate and measure you at the same time. Together, they will help decide what features of a power wheelchair you need based on your own presentation and abilities.

## 2019-04-10 NOTE — PROGRESS NOTES
OUTPATIENT NEUROLOGICAL REHABILITATION  PHYSICAL THERAPY Re-EVALUATION AT ALS MULTIDISCIPLINARY CLINIC    Name: Arianne Johnson  Clinic Number: [id number]    Diagnosis:   Encounter Diagnoses   Name Primary?    ALS (amyotrophic lateral sclerosis) Yes    Impaired mobility and ADLs     Dysarthria     Dysphagia, unspecified type     Bilateral foot-drop     DDD (degenerative disc disease), lumbar     Lumbar facet arthropathy     Neural foraminal stenosis of lumbar spine     Muscle cramps     Bronchitis     Impaired gait and mobility      Physician: Dr. Elaine/ Dr. Woo   Treatment Orders: PT Eval at ALS clinic  Past Medical History:   Diagnosis Date    Abnormal Pap smear     repeats were normal    ALS (amyotrophic lateral sclerosis)     Breast disorder     benign left breast biopsy    COPD (chronic obstructive pulmonary disease)     due to second hand smoke    Encounter for blood transfusion     GERD (gastroesophageal reflux disease)     Hay fever     Hyperlipidemia     PONV (postoperative nausea and vomiting)     Seizures 02/1960    after giving birth, none since       Clinic Evaluation Date: 04/10/2019  ALS clinic visit #: 2  Plan of care expiration: Re-Eval Only   Precautions: Fall risk    Time in: 10:29 AM   Time out: 11:02 AM    History & Subjective     Medical Diagnosis: ALS  PT Diagnosis:   Encounter Diagnoses   Name Primary?    ALS (amyotrophic lateral sclerosis) Yes    Impaired mobility and ADLs     Dysarthria     Dysphagia, unspecified type     Bilateral foot-drop     DDD (degenerative disc disease), lumbar     Lumbar facet arthropathy     Neural foraminal stenosis of lumbar spine     Muscle cramps     Bronchitis     Impaired gait and mobility        History of Present Illness: Arianne is a 78 y.o. female that presents to [facility] secondary to dx of ALS. Pt was first diagnosed 10/25/18. Symptom onset was March 2018 with initial symptoms of speech and  swallowing followed by limbs.       Current Functional Deficits/Chief Complaint:   1. Fatigue  2. R LE weakness  3. R LE edema  4. Hands weak  5. Falls     Prior Therapy: HHPT/OT that was discontinued in February   Social History:  recently passed. Daughter and son in law moved in with her.   Place of Residence (Steps/Adaptations/Levels): [address]; 1 level home  Home adaptations: handrails added in bathroom, has a ramp over steps to get into the home  DME owned: Small based Quad cane, rollator, bedside comode, AFO  Exercise routine: none    Pt stated goals/concerns: To learn about ALS and recommendations    Family present/states: daughter present  Pain: 0/10    Falls: 1 recent fall in the bathtub - was trying to step out and fell backwards    Objective     Mental status: alert, oriented to person, place, and time  Appearance: Casually dressed  Behavior:  calm and cooperative  Attention Span and Concentration:  Normal    Dominant hand:  right     Posture Alignment:  difficulty maintaining upright position in standard chair  Head and neck: adequate head control, forward head   Trunk: thoracic kyphosis increased - flexible with external correction, scoliosis: n/a WFL   Pelvic tilt A/P posterior- flexible with self and external correction (fatigues quickly); left/right WFL; rotation Neutral; obliquity WFL   Hips: neutral position, windswept neutral                    Sensation: Light Touch: Intact         Tone: decreased  Limbs/muscles affected: R LE > L LE    Edema: moderate R LE edema      Coordination:    - LE coordination:  impaired - moderate R > L     Lower Extremity PROM - WNL for all    Lower Extremity Strength  Right LE  Left LE    Hip flexion: 3/5 Hip flexion: 4-/5   Hip extension: 3+/5 Hip extension: 4-/5   Hip abduction: 3+/5 Hip abduction: 3+/5   Hip adduction: 4-/5 Hip adduction 4-/5   Knee extension: 4/5 Knee extension: 4/5   Knee flexion: 3+/5 Knee flexion: 4-/5   Ankle PF 3/5 Ankle PF 3+/5   Ankle  dorsiflexion: 3/5 Ankle dorsiflexion: 4-/5      Evaluation Re-Eval 4/10/19   Activities Balance Confidence (ABC) scale  43% N/T   Sitting Functional Reach N/T 8 1/2 inches     Sitting balance static: good -  Sitting balance dynamic: fair  Standing balance static: fair -  Standing balance dynamic: n/a    Gait Assessment:   - AD used: Rollator  - Assistance: modified independence  - Distance: around the home, limited distances to [employer]    Endurance Deficit: severe     Functional Mobility (Bed mobility, transfers)  Bed mobility: Mod I  Supine to sit: Mod I  Sit to supine: Mod I  Transfers to bed: Mod I to Mary depending on energy level  Transfers to toilet: Mod I  Sit to stand:  Mod I  Stand pivot:  Mod I  Car transfers: Mod I, requires Mary for elevated cars  Wheelchair mobility: n/a    Education provided re: POC and energy conservation.   Arianne verbalized good understanding of education provided.   Pt has no cultural, educational or language barriers to learning provided.      Assessment   This is a 78 y.o. female referred to outpatient physical therapy and presents with a medical diagnosis of ALS. She is currently ambulating around the home with a Rollator due to fatigue when walking. Patient only ambulates in the community very limited distances for [employer] and avoids most other outings due to effort and fatigue of walking. When standing up without UE support, the patient shows severe trunk strength deficits and begins to hunch forward due to core stabilizer weakness. She is unable to stand unsupported for >10 seconds. While in sitting, the patient had difficulty maintaining upright posture due to trunk weakness but can get into proper upright sitting position for very short periods of time. Since the patient is ambulating less around the community and the house since previous visit due to fatigue and endurance issues, the patient has developed moderate RLE edema. A PWC is necessary to improve ability to return to  community mobility, improve energy conservation, improve posture, reduce stress placed on her low back, and reduce LE edema. A PWC evaluation was completed today in conjunction with OT, Dedra Ignacio and WC  Armando Person from [facility]. Features of PWC will include but not be limited to tilt and recline for pressure relief, joystick driving options, and elevating leg rests for pressure adjustments and edema control. Patient had been seen for HHPT/OT but was discharged in February. HHPT would be beneficial for the patient to include transfer training, LE stretching, and PWC mobility training.  No further outpatient skilled PT warranted at current.     Pt rehab potential is Fair.     History  Co-morbidities and personal factors that may impact the plan of care Examination  Body Structures and Functions, activity limitations and participation restrictions that may impact the plan of care    Clinical Presentation   Co-morbidities:   advanced age, history of cancer and transportation assistance required        Personal Factors:   no deficits Body Regions:   back  lower extremities  upper extremities  trunk    Body Systems:    gross symmetry  ROM  strength  gross coordinated movement  balance  gait  transfers  transitions  motor control  motor learning             Activity limitations:   Mobility  lifting and carrying objects  fine hand use (grasping/picking up)  walking  moving around using equipment ()  using transportation (bus, train, plane, car)  driving (bike, car, motorcycle)    Self care  looking after one's health    Domestic Life  shopping  cooking  doing house work (cleaning house, washing dishes, laundry)    Community and Social Life  community life  recreation and leisure  [employer] and spirituality         evolving clinical presentation with changing clinical characteristics                      moderate   high  high Decision Making/ Complexity Score:  moderate     Functional Limitations Reports - G Codes    Category: Mobility   Tool: transfer assist to bed  Score: Mary  Current: CK at least 40% < 60% impaired, limited or restricted  Goal: CK at least 40% < 60% impaired, limited or restricted  Discharge: CK at least 40% < 60% impaired, limited or restricted       Plan   Pt to continue to follow up with referring provider and ALS clinic.     Lisandra Coreas, PT  04/10/2019

## 2019-04-10 NOTE — PROGRESS NOTES
"CHIEF COMPLAINT:  Follow-up    HISTORY OF PRESENT ILLNESS: Arianne Johnson is a 78 y.o. female with onset of shortness of breath ~one year ago, treated empirically for asthma without significant improvement.  Progressed to dyspnea with activities of daily living.  No history of primary tobacco (+ second hand smoke), no asthma, no occupational exposures to dusts or fumes.    Dry mouth and throat in am.  Hoarseness.  Swallowing small bites carefully, weight decreased from 121-113. Sinus congestion treated with cetirizine/montelukast.   Onset of dz with 3/26/18 with right "foot slap".  R ankle edema, mild, persistent.      PAST MEDICAL HISTORY:    Past Medical History:   Diagnosis Date    Abnormal Pap smear     repeats were normal    ALS (amyotrophic lateral sclerosis)     Breast disorder     benign left breast biopsy    COPD (chronic obstructive pulmonary disease)     due to second hand smoke    Encounter for blood transfusion     GERD (gastroesophageal reflux disease)     Hay fever     Hyperlipidemia     PONV (postoperative nausea and vomiting)     Seizures 1960    after giving birth, none since       PAST SURGICAL HISTORY:    Past Surgical History:   Procedure Laterality Date    APPENDECTOMY      BREAST BIOPSY Left     benign    COLONOSCOPY N/A 2017    [facility]    DILATION AND CURETTAGE OF UTERUS      [id number]-A-CATH Left 2018    [facility]    TONSILLECTOMY, ADENOIDECTOMY      TOTAL ABDOMINAL HYSTERECTOMY         FAMILY HISTORY:                Family History   Problem Relation Age of Onset    Breast cancer Maternal Aunt     Cancer Daughter         adenocarcinoma of lung with mets to brain    Cancer Grandchild         epitheloid angiosarcoma    Ovarian cancer Neg Hx        SOCIAL HISTORY:          Occupation / Environment: no occupational exposures  Social support: lives in [place],   " recently, daughter Geena and  moved in to care for her.   Social History     Tobacco Use   Smoking Status Never Smoker   Smokeless Tobacco Never Used       ALLERGIES:    Review of patient's allergies indicates:   Allergen Reactions    Codeine Nausea Only    Demerol [meperidine]      nausea    Sulfa (sulfonamide antibiotics) Nausea Only       CURRENT MEDICATIONS:    Current Outpatient Medications   Medication Sig Dispense Refill    atorvastatin (LIPITOR) 40 MG tablet TAKE 1 TABLET(40 MG) BY MOUTH EVERY DAY 90 tablet 0    cetirizine (ZYRTEC) 10 MG tablet Take 1 tablet (10 mg total) by mouth once daily.  11    fluticasone (FLONASE) 50 mcg/actuation nasal spray daily as needed.       HYDROcodone-acetaminophen (NORCO) 5-325 mg per tablet Take 1 tablet by mouth every 6 (six) hours as needed for Pain. 10 tablet 0    ibandronate (BONIVA) 150 mg tablet TAKE 1 TABLET(150 MG) BY MOUTH EVERY 30 DAYS 1 tablet 11    meclizine (ANTIVERT) 25 mg tablet Take 1 tablet (25 mg total) by mouth 3 (three) times daily as needed. 30 tablet 3    montelukast (SINGULAIR) 10 mg tablet every evening.       omeprazole (PRILOSEC) 40 MG capsule TAKE 1 CAPSULE(40 MG) BY MOUTH EVERY DAY 30 capsule 11    PROAIR HFA 90 mcg/actuation inhaler daily as needed.       riluzole 50 mg Tab tablet Take 1 tablet (50 mg total) by mouth every 12 (twelve) hours. 60 tablet 11     No current facility-administered medications for this visit.                   REVIEW OF SYSTEMS:   Pulmonary related symptoms as per HPI.  Gen:  + weight loss, no fever, no night sweats  HEENT:  + sinus congestion, mild dysphagia, + voice changes - hoarseness  CV:  no chest pain, no orthopnea, no paroxysmal nocturnal dyspnea  GI:  no melena, no hematochezia, no diarrhea, no constipation.  :  no dysuria, no hematuria, no incontinence  Neuro:  no syncope, + focal weakness, ambulatory with rollator  Sleep:  rested     PHYSICAL EXAM:   Respiratory Rate: 22 NIV during  clinic visit?  N  Vitals:    04/10/19 0813   Weight: 51.8 kg (114 lb 3.2 oz)   Height: 5' (1.524 m)   PainSc:   5   PainLoc: Neck     GENERAL:  Alert, calm, in no  distress  HEENT:  Normal pupils, normal conjunctiva, normal EOM, nasal and oral mucosa normal, tongue + fasciculations (tr)  NECK:  supple; no palpable lymphadenopathy or masses,no jugular venous distention.  CVS: regular rate and rhythm, no murmers, gallops or rubs  PULM: Grossly decreased vital capacity, normal to percussion and palpation, clear to auscultation bilaterally with no wheezes, crackles or rhonchi, + accessory muscle use with inspiratory effort  ABDOMEN:  soft nontender/nondistended, rise with inspiration, intact contraction with cough  EXTREMITIES no cyanosis, clubbing or edema  NEURO:  Focal weakness, ambulatory w rollator    CONTRIBUTORY STUDIES:     PULMONARY FUNCTION TESTS: FVC 52%    O2 93%    ACTIVE PULMONARY PROBLEMS:    ICD-10-CM ICD-9-CM    1. ALS (amyotrophic lateral sclerosis) G12.21 335.20    2. Impaired mobility and ADLs Z74.09 799.89    3. Dysarthria R47.1 784.51    4. Dysphagia, unspecified type R13.10 787.20    5. Bilateral foot-drop M21.371 736.79     M21.372     6. DDD (degenerative disc disease), lumbar M51.36 722.52    7. Lumbar facet arthropathy M47.816 721.3    8. Neural foraminal stenosis of lumbar spine M99.83 724.02    9. Muscle cramps R25.2 729.82        ASSESSMENT&PLAN:  1.  Neuromuscular respiratory muscle weakness, dyspnea on exertion, no orthopnea.  Will defer NIV for now  2.  Decreased cough efficacy - not yet very comfortable with device, but effective when treating congestion  3.  Sinus congestion - taking cetirizine and montelukast.  Restart daily flonase   4.  Difficulty clearing secretions, will add prn nebulized albuterol to assist with clearance.  Will cont to follow with Dr. Ward (pulmonary) for availability and continuity.      No follow-ups on file.      [Greater than 50% of this 25 minute  visit spent counseling patient and family]

## 2019-04-10 NOTE — PROGRESS NOTES
Pt is a 77 y/o w/w/f seen today in ALS Clinic for 2ND clinic visit.  Pt is alert and oriented to person, place, and time.  She responds appropriately during conversation.  Voice is raspy, speaks a little low(maybe voice amplifier).  Pt reports the more she speaks the more tired her voice gets.  Pt's family present for clinic visit dtr Geena..  Pt lives at home with her dtr Geena and son in law Saravanan.  Pt's spouse  2018.  Pt reports she now requires some assistance with bathing but is able to manage other ADLs.  Pt reports she fell Sat Night in bathtub, indicating her buttocks hurt.  Pt continues to c/o about LE weakness and fatigue. Pt reports she uses the cane only in bathroom as she is unable to get rollator in the bathroom (too small).  Pt/dtr indicated pt chooses to remain home most of the time d/t being fatigued from walking.  SW discussed possibility of loaner transport  w/c from [facility].   Pt has support of her children; Geena, Joe, Mitesh, Jimena, Gita, Tutu ( 3/2018) as well as grandchildren.  Pt receives Soc Sec survivors benefits from spouse and his USP.   SW discussed community choices waiver.  Pt's dtr indicated they applied, pt was approved for 36 hrs (waiver service provider [employer] [place] [phone]) but was then told they needed to apply for Medicaid.  Pt's dtr assisted pt with applying for Medicaid.  Awaiting outcome for Medicaid application.  This SW's understanding of CCW services, pt didn't need to qualify for Medicaid only meet the income guiadlines for CCW but not Medicaid.  SW will research CCW program regarding any updated changes.  Pt not followed by hh.  Pt requested restarting hh pt/ot and inquired about flushing pt's port (from outpt infusion).  SW will await rec at ALS Clinic wrap up.  Pt/family verbalized no needs at this time.          JANES COOK

## 2019-04-10 NOTE — PROGRESS NOTES
MNT Follow-up:    Pt and daughter present today in ALS multidisciplinary clinic.  Daughter does grocery shopping and meal preparation.  Pt states she has no appetite; usually eats only two meals/day.  May snack on pecan pinwheel snack cake with occasional glass of whole milk or peanut butter and jelly sandwich.  Pt states she takes very small bites, and chews food well.    Weight history:    Today, 4/10/19:  51.8 kg - standing scale                3/28/19:  52 kg documented in Family Medicine OV note              11/28/18:  54.8 kg -documented at initial ALS Clinic visit    Pt with 3 kg (6.6 lb) weight loss over past 5 months.  Discussed strategies to increase calorie/protein intake, and reminded pt/daughter of written materials provided at previous clinic.  Daughter states the information is at home, and they will review it.    When I mentioned possibility of feeding tube placement if weight loss continues, pt states she doesn't want to consider that at this time.  Encouraged pt to increase po intake.    Will continue to follow to monitor nutritional status per ALS clinic protocol.

## 2019-04-10 NOTE — PROGRESS NOTES
OUTPATIENT NEUROLOGICAL REHABILITATION  SPEECH THERAPY EVALUATION  ALS MULTIDISCIPLINARY CLINIC    Date: 4/10/2019  Start Time: 0946  Stop Time:  1015      Procedure Min.   Evaluation of Speech Sound Production  15   Swallow and Oral Function Evaluation   14   Total Minutes: 29 minutes    ALS Clinic: 2  Authorization Period: Eval Only   Plan of Care Expiration: Eval Only     History   Onset Date:  Symptoms began March 2018, diagnosed October 25, 2018  Medical Diagnosis:  Amyotrophic Lateral Sclerosis   Treatment Diagnosis:    Encounter Diagnoses   Name Primary?    Dysarthria     Dysphagia, unspecified type      Physician: Dr. Woo/Dr. Elaine  Orders: Ambulatory referral for Speech therapy evaluate and treat; Evaluation only  Past Medical History:  has a past medical history of Abnormal Pap smear, ALS (amyotrophic lateral sclerosis), Breast disorder, COPD (chronic obstructive pulmonary disease), Encounter for blood transfusion, GERD (gastroesophageal reflux disease), Hay fever, Hyperlipidemia, PONV (postoperative nausea and vomiting), and Seizures (02/1960).   History of Present Illness: Arianne Johnson  presents to the [facility] secondary to the diagnosis of ALS Disease. Pt reported that since November, she feels that the longer she talks, her voice goes out. She still feels that she is understood over the phone. She has lost weight since last visit. She reports that she has not been avoiding any foods, but coughs after eating spicy foods.     Subjective   Prior Therapy: Discharged from  in February.                  Home Health Therapy at present time: No  Pain: 0 /10  Respiratory Status: At previous clinic, she reported she is getting a cough assist machine. She does daily breathing treatments.   Nutrition:  regular diet with thin liquids  Recent MBSS:  MBSS completed 12/18/2018. Recommended regular diet with thin liquids.   Social History:   Pt's daughter and son in law have moved in with her. Patient was accompanied by her daughter to the session.  Vision: Patient wears glasses.  Hearing: Appeared to be within functional limits in conversation. Will monitor and refer as necessary.  Prior Level of Function:  Patient with no prior hx of decreased speech intelligibility, voice issues, or swallow dysfunction prior to diagnosis.   Changes of note from last ALS clinic include: Vocal quality with increased strain   Chief Complaint: Voice gives out after speaking for long periods of time Pt coughing after eating spicy foods.  Precautions:  Fall, Aspiration  Objective   Pt's motor speech, fluency, and voice were informally assessed. OME performed within Cranial Nerve Assessment detailed below. Motor speech skills were assessed and were functional for daily communication with a variety of communication partners (i.e. Family, friends, medical personnel).   Respiration / Phonation:   # of reps/ 5s Norms/ # reps per second Maximum Phon. Time (ah) Words Per Minute:   P^ 11/5s 5.0-7.1 2.2 Trial 1: 5s     120 /  1 minute   #words/  minute   T^ 17/5s 4.8-7.1 3.4 Trial 2: n/a >125 = WFL    K^ 17/5s 4.4-7.4 3.4  Trial 3: n/a <125 = refer for AAC eval   P^T^K^ 5/5s 3.6-7.5 1 Avg: n/a   Norm: 160-170  (paragraph reading)       Norm (F 10-26, M 13-34.6) Norm: 150 to 250 (norm conversation)        Phonation Oral Reading Conversation   Stimulus Sustained ah: Patient's vocal quality with increased strain as phonation time increased    Singing up scale: Strained/Strangled Vocal Quality    Counting 1 to highest # on one breath: 8 [place] Passage History and Conversation   Quality Rough, strained, dysarthria Rough, strained, dysarthria Rough, strained, dysarthria   Duration  5 seconds  60 seconds  N/a   Loudness  monoloudness, low volume monoloudness, low volume  monoloudness, low volume   Steadiness Varied pitch Varied pitch Varied pitch     Overall Speech  Intelligibility: Good. However, patient demonstrates slow rate of speech with strained vocal quality that fades over conversation.    Augmentative/Alternative Communication (AAC): Pt is not currently using an augmentative/ alternative communication device. She  would benefit from continued monitoring for a full AAC evaluation given the progressive nature of ALS. Text to speech applications were discussed with patient. Patient stated that she downloaded an application after last ALS clinic. Voice amplification devices were recommended to patient for vocal efficiency in a variety of communication settings.   Recommend AAC Eval: no    Clinical Swallow Exam/ Pepe Mechanism Exam:  Mandibular Strength and Mobility - Trigeminal Nerve (CNV) Rest: .WFL   Lateralization: WFL  Protrusion: WFL  Retraction:  WFL  Involuntary Movement: absent    Oral Labial Strength and Mobility -Facial Nerve (CN VII)  Rest: WFL   Lateralization: decreased range of motion  Protrusion: WFL  Retraction:  WFL  Involuntary Movement: absent    Lingual Strength and Mobility- Hypoglossal Nerve (CN XII)  Rest: WFL   Lateralization: WFL  Protrusion: WFL  Retraction:  WFL  Involuntary Movement: absent    Velar Elevation - Glossopharyngeal Nerve (CN IX) and Vagus (CN X) Rest: WFL    Symmetry: WFL  Elevation:WFL  Sustained elevation: WFL  Involuntary Movement: absent    Buccal Strength and Mobility - Facial Nerve (CN VII)  WFL    Facial Sensation and Movement - Facial Nerve (CN VII) Symmetrical at rest:   Wrinkle forehead: yes  Able to close eyes tightly: yes     Structure Abnormalities: no  Dentition: present, adequate oral hygiene  Secretion Management: Absence of drooling  Mucosal Quality: WFL  Volitional Cough: WFL  Volitional Swallow: not tested  Voice Prior to PO Intake: Clear    Clinical Swallow Examination:   Pt presented with:   THIN:-self regulated cup sip of water x2    PUREE:- tsp bites of pudding x2    SOLID: -bite of mimi cracker x1      DESCRIPTION: Oral Phase: Patient's oral phase was remarkable for increased mastication time. No residue remaining in oral cavity following trials of puree and solids.  Pharyngeal:  Pharyngeal phase unremarkable. No overt signs or symptoms of aspiration across all PO trials. Patient demonstrated slow anterior to posterior transit time. She swallowed each spoonful of puree 2 x's. Clinician educated patient on efficacy of double swallow to ensure all residue is cleared.     Recommend MBSS: no    Education   Patient and her  family were educated on the usefulness of voice amplification device and text to speech applications. Also educated regarding reflux precautions.  Daughter and pt verbalized understanding.     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 Her deficits were characterized by slow rate of speech, reduced stress  and inadequate breath- speech coordination  and strain-strangled vocal quality, monoloudness and varied pitch Her swallow was within functional limits. Discussed reflux precautions with patient to address coughing after meals. Demonstrates impairments including limitations as described in the problem list. Positive prognostic factors include family support. Negative prognostic factors include progressive nature of disease. Barriers to progress include progressive nature of disease.     Pt's spiritual, cultural and educational needs considered and pt agreeable to plan of care and goals.    ALS Severity Scale:  Stage 2: Detectable Speech Disturbance:  Speech remains easily understood, but changes are noted by others, especially during fatigue or stress. ALS Severity Scale: 7 or 8      JANET National Outcome Measurement System (NOMS) and Functional Communication Measure (FCM):   Severity Modifier for Medicare G-Code:  Motor Speech  Current status: FCM: LEVEL 6: The individual is successfully able to communicate intelligibly in most activities,  "but some limitations in intelligibility are still apparent in vocational, avocational, and social activities. The individual rarely requires minimal cueing to produce complex sentences/messages intelligibly. The individual usually uses compensatory strategies when encountering difficulty.  - CI at least 1% but less than 20% impaired, limited or restricted    Voice  Current status: FCM: LEVEL 4: Voice is functional for communication, but sometimes distracting. The individual¢s ability to participate in vocational, avocational, and social activities requiring voice is occasionally affected in low-vocal demand activities, but consistently affected in high-vocal demand activities.  - CK at least 40% < 60% impaired, limited or restricted        Swallowing  Current status:  FCM: LEVEL 6: Swallowing is safe, and the individual eats and drinks independently and may rarely require minimal cueing. The individual usually self-cues when difficulty occurs. May need to avoid specific food items (e.g., popcorn and nuts), or require additional time (due to dysphagia). -  CI at least 1% but less than 20% impaired, limited or restricted        Rehab Potential: fair to guarded    Plan    Recommended Treatment Plan: 1.  Follow up with [facility] in 3 months 2. Recommend text- to- speech applications and voice amplification device to augment communication in a variety of environments. 3. GERD Precautions (I.e. Avoid spicy and acidic foods, remain upright after meals for 60-90 minutes at least)    Plan of care expiration: Eval Raman Mario  Student Speech-Language Pathologist       "I, JANET Aleman, CCC-SLP , certify that I was present in the room directing the student in service delivery and guiding them using my skilled judgement.  As the co-signing therapist, I have reviewed the student's documentation and am responsible for the treatment, assessment and plan."  Hope " JANET Dubose, CCC-SLP  Speech Language Pathologist   4/10/2019    Date: 4/10/2019

## 2019-04-13 PROBLEM — M21.371 RIGHT FOOT DROP: Chronic | Status: ACTIVE | Noted: 2018-01-01

## 2019-04-13 NOTE — ASSESSMENT & PLAN NOTE
The patient was seen today for mobility evaluation for a power mobility device due to significant impairment at home. She has gait impairment and is unable to use a walker consistently more than 20-30 feet due to BLE weakness and fatigue secondary to ALS. She also has impaired balance due to weakness related to ALS. She is not able to ambulate safely to the kitchen or living room and is unable to use an optimally-configured manual wheelchair in the home in order to perform Mobility Related Activities of Daily Living due to BUE weakness & fatigue. She also has dyspnea due to respiratory muscle fatigue. Her cognition is intact and she should be able to use a power mobility device well at home. She was given a prescription for a power wheelchair. A scooter would not be appropriate due the patient's difficulty controlling the scooter tiller due to hand weakness & fatigue and to maneuverability restrictions at home. A power wheelchair will allow the patient to go safely to the kitchen, dining room or living room for feeding & socialization.   - She will need device for PWC to hold tablet / augmented communication device.   - PT/OT orders renewed for 2019

## 2019-04-13 NOTE — PROGRESS NOTES
Subjective:       Patient ID: Arianne Johnson is a 78 y.o. female.    Chief Complaint:  Follow-up; OT Progress Note; PT Progress Note; and Nutrition Counseling      History of Present Illness  Diagnosed in 2018. Here for her 2nd ALS Clinic visit. On Riluzole 50 Q12, recent LFT normal. She was taking Radicava but after two infusions she felt like her L LE weakness was progressing faster, which she attributed to the infusion, and so she chose to stop the drug.    Not wearing AFO secondary to discomfort with bulky plastic option. Recent fall was not due to foot drop, but slipped in bathtub and bruised her tailbone.    Mobility and safety while mobile are declining.       Review of Systems  Review of Systems   Constitutional: Positive for activity change, fatigue and unexpected weight change. Negative for fever.   HENT: Positive for voice change. Negative for drooling and trouble swallowing.    Eyes: Negative for visual disturbance.   Respiratory: Positive for shortness of breath. Negative for choking and chest tightness.    Cardiovascular: Negative for chest pain.   Gastrointestinal: Negative for abdominal distention and constipation.   Endocrine: Negative for cold intolerance and heat intolerance.   Genitourinary: Negative for difficulty urinating.   Musculoskeletal: Positive for back pain, gait problem and myalgias. Negative for neck stiffness.   Skin: Negative for color change.   Allergic/Immunologic: Negative for environmental allergies and immunocompromised state.   Neurological: Positive for speech difficulty and weakness. Negative for dizziness, numbness and headaches.   Hematological: Negative for adenopathy.   Psychiatric/Behavioral: Negative for dysphoric mood, self-injury and sleep disturbance.       Objective:      Neurologic Exam     Cranial Nerves     CN III, IV, VI   Pupils are equal, round, and reactive to light.  Extraocular motions are normal.       Physical Exam   HENT:   Head: Normocephalic and  atraumatic.   Eyes: Pupils are equal, round, and reactive to light. EOM are normal.   Neck: Normal range of motion. Neck supple. No thyromegaly present.   Cardiovascular: Normal rate.   Pulmonary/Chest: Effort normal.   Abdominal: Soft.   Lymphadenopathy:     She has no cervical adenopathy.   Skin: Skin is warm and dry.   Psychiatric: She has a normal mood and affect. Her behavior is normal. Thought content normal.   Vitals reviewed.      ALS Physical Exam PBA Speech Facial Strength Tongue Strength Tongue Appear Limb Fasciculations Neck Flex HHD   4/10/2019 No mild normal mild atrophied Absent 10.4      ALS Physical Exam (Continued) Neck Flex MMT Neck Ext HHD Neck Ext MMT Shd Abd R HHD Shd Abd R MMT Shd Abd L HHD Shd Abd L MMT   4/10/2019 4+ 12.9 4 28.4 5 28.8 5      ALS Physical Exam (Continued) Wrist Ext R HHD Wrist Ext R MMT Wrist Ext L HHD Wrist Ext L MMT Hand  R (kg) Hand  L (kg) Hip Flex R HHD   4/10/2019 13 5 11.2 4+ 10 12 28.8      ALS Physical Exam (Continued) Hip Flex R MMT Hip Flex L HHD Hip Flex L MMT Knee Ext R HHD Knee Ext R MMT Knee Ext L HHD Knee Ext L MMT   4/10/2019 5 29.1 5 26.1 4+ 31.5 5      ALS Physical Exam (Continued) Foot DF R HHD Foot DF R MMT Foot DF L HHD Foot DF L MMT UMN Signs Arms Type UMN Signs Legs UMN Signs Legs Type   4/10/2019 - 3 10.5 4- 3+ DTRs Yes 3+ DTRs       ALSFRS Score:  35  FRS-6 Score:  17                  Impression:        1. ALS (amyotrophic lateral sclerosis)    2. Impaired mobility and ADLs    3. Dysarthria    4. Dysphagia, unspecified type    5. Bilateral foot-drop    6. DDD (degenerative disc disease), lumbar    7. Lumbar facet arthropathy    8. Neural foraminal stenosis of lumbar spine    9. Muscle cramps    10. Bronchitis    11. Impaired gait and mobility    12. Nutritional assessment    13. Mixed hyperlipidemia           Recommendations:         Problem List Items Addressed This Visit     ALS (amyotrophic lateral sclerosis) - Primary    Current  Assessment & Plan     Diagnosed in 2018. On Riluzole. Tried Radicava but after two infusions she felt like it had precipitated increased weakness in the L LE and so she stopped using.   - Continue Riluzole   - Labs ordered         Relevant Medications    albuterol (ACCUNEB) 1.25 mg/3 mL Nebu    Other Relevant Orders    Ambulatory Referral to Physical/Occupational Therapy    Ambulatory Referral to Speech Therapy    Dysarthria    Current Assessment & Plan     ST orders renewed for 2019         Relevant Orders    Ambulatory Referral to Speech Therapy    Dysphagia    Impaired mobility and ADLs    Current Assessment & Plan     Amb ref placed to Home Health for evaluation         Relevant Orders    Ambulatory Referral to Physical/Occupational Therapy    Impaired gait and mobility    Current Assessment & Plan     The patient was seen today for mobility evaluation for a power mobility device due to significant impairment at home. She has gait impairment and is unable to use a walker consistently more than 20-30 feet due to BLE weakness and fatigue secondary to ALS. She also has impaired balance due to weakness related to ALS. She is not able to ambulate safely to the kitchen or living room and is unable to use an optimally-configured manual wheelchair in the home in order to perform Mobility Related Activities of Daily Living due to BUE weakness & fatigue. She also has dyspnea due to respiratory muscle fatigue. Her cognition is intact and she should be able to use a power mobility device well at home. She was given a prescription for a power wheelchair. A scooter would not be appropriate due the patient's difficulty controlling the scooter tiller due to hand weakness & fatigue and to maneuverability restrictions at home. A power wheelchair will allow the patient to go safely to the kitchen, dining room or living room for feeding & socialization.   - She will need device for PWC to hold tablet / augmented communication  device.   - PT/OT orders renewed for 2019             Relevant Orders    Ambulatory Referral to Physical/Occupational Therapy    Hyperlipemia      Other Visit Diagnoses     Bilateral foot-drop        Relevant Orders    Ambulatory Referral to Physical/Occupational Therapy    DDD (degenerative disc disease), lumbar        Lumbar facet arthropathy        Neural foraminal stenosis of lumbar spine        Muscle cramps        Bronchitis        Relevant Medications    albuterol (ACCUNEB) 1.25 mg/3 mL Nebu    Nutritional assessment                Ravi Woo MD  [facility]

## 2019-04-13 NOTE — ASSESSMENT & PLAN NOTE
Diagnosed in 2018. On Riluzole. Tried Radicava but after two infusions she felt like it had precipitated increased weakness in the L LE and so she stopped using.   - Continue Riluzole   - Labs ordered

## 2019-04-17 NOTE — TELEPHONE ENCOUNTER
Orders faxed to [facility] Attn: Rain (Tel: [phone] Fax: [phone]). PT/OT for ADLs, mobility training, and Custom Power Wheelchair eval. Included Therapy Education Guide and SETH Roberts, contact information. ATP scheduled to complete in-home assessment on 4/26/19.     Contacted Mrs. Johnson to inform of the above mentioned actions. Patient confirmed ATP visit. Also, notified Mrs. Acuña that she will not need to have port flushed, since she will no longer utilize it (per Dr. Castorena and Dr. Woo). Patient indicated that was a big relief off of her plate of things to worry over.  RN Coordinator noted that patient was experiencing difficulty clearing secretions during phone conversation. Encouraged patient to use cough assist and suction machine to help facilitate airway clearance. Patient's daughter, Geena, was present. Requested that Geena assist mother with respiratory care. Geena and Mrs. Johnson verbalized understanding.

## 2019-04-17 NOTE — PROGRESS NOTES
Case f/u:    Pt's dtr Geena indicated they applied for the Community Choices Waiver. Pt was approved for 36 hrs (waiver service provider [employer] [place] [phone]) but was then told they needed to apply for Medicaid.  Pt's dtr assisted pt with applying for Medicaid.  Awaiting outcome for Medicaid application.  This SW's understanding of CCW services, pt didn't need to qualify for Medicaid only meet the income guiadlines for CCW but not Medicaid.  SW will research CCW program regarding any updated changes.    To clarify eligibility of services Individuals who are applying for a Community Choices Waiver do have to qualify for Medicaid.  In addition, they have to meet the Office of Aging and Adult Services guidelines.  When an individual initially applies for Medicaid they should alert the  that they have ALS and are seeking Medicaid in order to receive the Community [place] Waiver.   There is also a Waiver Spend-down option which aids with qualifying for services.        Sw left vm message for pt's dtr Geena to relay the above information and to inquire if she has heard anything from Medicaid as of yet.         JANES COOK

## 2019-04-23 NOTE — PROGRESS NOTES
Case f/u regarding Community Choice Waiver.  Return call received from pt's dtr Geena informing SW, pt was a financial denial for Medicaid therefore she was ineligible for the Community Choices Waiver (pt receives more than $2000 a month for a single person).  VIRY verbalized understanding.               JANES COOK

## 2019-04-26 NOTE — TELEPHONE ENCOUNTER
Vm received from pt requesting status of services from [facility].  SW alerted ALS CC KRISTINE [facility].          JANES COOK

## 2019-05-03 NOTE — TELEPHONE ENCOUNTER
----- Message from Ruth Lopez sent at 5/3/2019  1:28 PM CDT -----  Contact: Nimco with [facility]   Nimco with [facility] need to speak with a nurse regarding patient having a continued weight loss poor appetite. She was just admitted to home health for therapy, please call back at [phone]

## 2019-05-03 NOTE — TELEPHONE ENCOUNTER
Spoke with Nimco with Home Health; pt dxd with ALS at 113 pounds;  reports that pt now down to 109 lbs; per  nurse: concerns for decreased appetite only taking bites at a time per family; family also reports that they have to schedule her meals otherwise pt will go all day without eating; pt not depressed per family but is weak; please advise.

## 2019-05-06 NOTE — TELEPHONE ENCOUNTER
Received phone call from Lucia with [facility] who reports that pt was weighed at home scale at 112 lbs; reports that previous nurse used an outside scale so they do not think the weight is a concern at this time;  nurse reports that pt is complaining of increased SOB; is attempting to use cough device provided at previous ALS clinic but it makes her feel bad; nurse and pt want to know if pt needs to follow up with you in clinic before July ALS clinic scheduled appt?

## 2019-05-07 NOTE — TELEPHONE ENCOUNTER
To increase her calorie intake, they can try using Benecalorie (odorless, flavorless, calorie rich food additive gel) to increase beneficial calories when she does eat. This can be added to shakes, smoothies, mashed potatoes, oatmeal, etc.      It's okay to encourage eating on a schedule, but avoid force feeding or overeating as this can make patients feel worse.  They are not expending as many calories or moving their GI tract as much due to immobility.    Most patients will purchase small cases of Benecalorie (by [employer]) online through pharmacies like [employer] or even [facility].

## 2019-05-07 NOTE — TELEPHONE ENCOUNTER
What company is providing her respiratory equipment, such as her cough assist?  Depending on who it is, we can send someone out to the home to help evaluate and make adjustments.  I am also notifying Dr. Castorena ([facility] clinic).

## 2019-05-07 NOTE — TELEPHONE ENCOUNTER
Pt cough assist device was provided in ALS clinic on [facility]. HH nurse updated with all information on both message threads. Verbalized understanding.

## 2019-05-09 NOTE — TELEPHONE ENCOUNTER
Incoming call from ANJU Kitchen with [facility] to inform MD that patient fell on yesterday. Patient was walking in bedroom b/w wall and bed when suddenly she fell backwards hitting her head on side rail of bed. Her daughter was home at time of incident. Geena heard patient yelling to Mary that she needed help. No injury noted; however, patient states that she does feel a little sore post fall. Patient utilized cane because there was not enough room bw wall and bed to use walker.     Since the fall, patient has changed bedrooms and uses the walker with ease in the new setting. Fidelina reports that patient will no longer use cane.       Incoming call from Janey with [facility] that noted RT Radha completed assessment with patient. Patient utilizing the cough assist properly. However, after clears secretions, experiencing shortness of breath. On 4/13/19, patient's had FVC of 52%. RT believes patient will benefit from NIV. Recommeded overnight pulse ox.

## 2019-05-15 NOTE — TELEPHONE ENCOUNTER
Home Health SOC 05/03/2019 - 07/01/2019 with [facility] ([place]) - Dr. Sai Woo. Patient received SN, PT, OT and ST services.

## 2019-05-17 NOTE — TELEPHONE ENCOUNTER
Incoming call from SARAH Myers with [facility]. Nurse inquired of Voice Banking process. ST with  recommended Voice Banking. Sent email to JENA Mondragon and Priscilla, Medical Student to notify of recommendation. Provided patient's contact information.     Outgoing call to Janey Jose with [facility] to discuss results of Overnight Pulse Ox. Rep indicated patient does qualify based on the impression. Requested copy to forward to Dr. Elaine and Dr. Castorena.     Janey noted that Medicare requires a FACE to FACE within last 30 days. Patient was seen in clinic on 4/10/19... 7 days past requirement.     Will send message to KRISTINE Min with Dr. Elaine's office to request appt to note need for Trilogy device.

## 2019-05-20 NOTE — TELEPHONE ENCOUNTER
Left voicemail and sent [facility] message to schedule follow up visit with Dr. Elaine. Will await return call or message.

## 2019-05-27 NOTE — PATIENT INSTRUCTIONS
Will plan for placement of a G-tube to assist with nutrition.    Will plan for Trilogy to help with your breathing.    Go ahead and crush riluzole in your food.    Wear your AFO to cut down on work when walking.

## 2019-05-27 NOTE — PROGRESS NOTES
NEUROLOGY  Neuromuscular and ALS Clinic  [facility]  [address]  [place] [address]  [phone] (office) / [phone] (fax)    Patient Name:  Arianne Johnson  :  1940  MR #:  [id number]  Acct #:  [id number]    Date of Clinic Follow Up: 2019  Name of Neurologist: Savannah Elaine D.O, ABPN, AOBNP, ABEM    Other Physicians:  Chiki Paniagua MD (Primary Care Physician); No ref. provider found (Referring)      Chief Complaint: Follow-up (ALS)      History of Present Illness (HPI):  Arianne Johnson is a 78 y.o. female here today for follow up of ALS.    Strength:  She has not noted declining strength in her lower extremities.    Dysarthria:  Her speech is hoarse but understandable.    Dysphagia: She is having increasing difficulty swallowing her pills.  It takes multiple attempts to get them down.  She sometimes feels like she is swallowing food down the wrong place.  She tries to take small bites and follow up with liquid.  Sometimes water will make her choke.  She is not coughing much when eating or drinking.  She has lost 3 pounds since January.     Breathing:  Patient complains of shortness of breath.  She gets this way walking, getting dressed, showering.  She can sometimes get SOB when eating or talking.  When she wakes in the morning she has a dry cough, but will continue to cough until it is a wet cough.  She feels she cannot fully clear her secretions.  She does use her suction machine, but doesn't always have success getting anything out.  She will sleep on her side.  She does have the head of her bed at night.  She has no headaches in the morning.    Secretions:  She is having increasing difficulty managing her secretions consistent with her dysphagia.  Her cough is getting weaker.  She cannot manage her saliva.    Treatment to date:   Rilutek  Radicava    Review of Systems:   General: Weight gain: No, Weight Loss: Yes, Fatigue: Yes,   Fever: No, Chills: No, Night  Sweats: No, Insomnia: No, Excessive sleeping: No   Respiratory:  Cough: Yes, Shortness of Breath: Yes,   Wheezing: No, Excessive Snoring: No, Coughing up blood: No  Endocrine: Heat Intolerance: No, Cold Intolerance: No,   Excessive Thirst: No, Excessive Hunger: No,   Eyes:  Blurred Vision: No, Double Vision: No,   Light Sensitivity: Yes, Eye pain: No  Musculoskeletal: Muscle Aches/Pain: No, Joint Pain/Swelling: No, Muscle Cramps: Yes, Muscle Weakness: Yes, Neck Pain: No, Back Pain: No   Neurological: Difficulty Walking/Falls: Yes, Headache Migraine: No, Dizziness/Vertigo: No, Fainting: No, Difficulty with Speech: Yes, Weakness: Yes, Tingling/Numbness: No, Tremors: No, Memory Problems: No, Seizures: No, Difficulty Swallowing: Yes, Altered Taste: No.  Cardiovascular: Chest Pain: No, Shortness of Breath: Yes,   Palpitations: No,  Gastrointestinal: Nausea/Vomiting: No, Constipation: No, Diarrhea: No, Bloody Stools: No   Psych/Cog:  Depression: No, Anxiety: No, Hallucinations: No, Problems Concentrating: No  : Frequent Urination: No, Incontinence: No, Blood of Urine: No, Urinary Infections: No, Changes in Sex Drive: No   ENT:Hearing Loss: No, Earache: No, Ringing in Ears: No,   Facial Pain: No, Chronic Congestion: No   Immune: Seasonal Allergies: Yes, Hives and/or Rashes: No  The remainder of the review of twelve body systems was reviewed and normal.      Past Medical, Surgical, Family & Social History:   Reviewed and updated.    Home Medications:     Current Outpatient Medications:     albuterol (ACCUNEB) 1.25 mg/3 mL Nebu, Take 3 mLs (1.25 mg total) by nebulization every 6 (six) hours as needed. Rescue, Disp: 1 Box, Rfl: 5    atorvastatin (LIPITOR) 40 MG tablet, TAKE 1 TABLET(40 MG) BY MOUTH EVERY DAY, Disp: 90 tablet, Rfl: 0    cetirizine (ZYRTEC) 10 MG tablet, Take 1 tablet (10 mg total) by mouth once daily., Disp: , Rfl: 11    fluticasone (FLONASE) 50 mcg/actuation nasal spray, daily as needed. , Disp: , Rfl:      HYDROcodone-acetaminophen (NORCO) 5-325 mg per tablet, Take 1 tablet by mouth every 6 (six) hours as needed for Pain., Disp: 10 tablet, Rfl: 0    ibandronate (BONIVA) 150 mg tablet, TAKE 1 TABLET(150 MG) BY MOUTH EVERY 30 DAYS, Disp: 1 tablet, Rfl: 11    meclizine (ANTIVERT) 25 mg tablet, Take 1 tablet (25 mg total) by mouth 3 (three) times daily as needed., Disp: 30 tablet, Rfl: 3    montelukast (SINGULAIR) 10 mg tablet, every evening. , Disp: , Rfl:     omeprazole (PRILOSEC) 40 MG capsule, TAKE 1 CAPSULE(40 MG) BY MOUTH EVERY DAY, Disp: 30 capsule, Rfl: 11    PROAIR HFA 90 mcg/actuation inhaler, daily as needed. , Disp: , Rfl:     riluzole 50 mg Tab tablet, Take 1 tablet (50 mg total) by mouth every 12 (twelve) hours., Disp: 60 tablet, Rfl: 11    Physical Examination:  /78 (BP Location: Right arm, Patient Position: Sitting, BP Method: Medium (Automatic))   Pulse 110   Resp 18   Wt 50.7 kg (111 lb 11.2 oz)   BMI 21.81 kg/m²     GENERAL:  General appearance: Well, non-toxic appearing.  No apparent distress.  Respiratory:  Normal.  Extremities: Normal.     MENTAL STATUS:  Alertness, attention span & concentration: normal.  Language: normal.  Orientation to self, place & time:  normal.  Memory, recent & remote: normal.  Fund of knowledge: normal.     SPEECH:  Hoarse and fluent.  Follows complex commands.     CRANIAL NERVES:  Cranial Nerves II-XII were examined.  II - Visual fields: normal.  III, IV, VI: PERRL, EOMI, No ptosis, No nystagmus.  V - Facial sensation: normal.  VII - Face symmetry & mobility: normal.  VIII - Hearing: normal.  IX, X - Palate: elevates on left more than right.  XI - Shoulder shrug: normal.  XII - Tongue protrusion: normal.     GROSS MOTOR:  Gait & station: slow, with normal width base, uses cane, no toe drag, steppage, or circumduction.  No scissoring.  Tone: normal.  Abnormal movements: none.  Finger-nose & Heel-knee-shin: normal.  Rapid alternating movements & drift:  normal.  Romberg: absent     MUSCLE STRENGTH:      Frequently induced cramps.     Fascics Atrophy RIGHT      LEFT Atrophy Fascics       5 Neck Ext. 5           5 Neck Flex 5           5 Deltoids 5           5 Sh.Ext.Rot. 5           5 Sh.Int.Rot. 5           5 Biceps 5           5 Triceps 5           5 Forearm.Pr. 5           5 Wrist Ext. 5           5 Wrist Flex 5           5 Finger Ext. 5           5 Finger Flex 5           5 FPL 5           5 Inteross. 5                                           5- Iliopsoas 5           5 Hip Abduct 5           5 Hip Adduct 5           5- Quads 5           5- Hams 5           4 Dorsiflex 4+           5 Plantar Flex 5           4 Ankle Darius 4+           4 Ankle Invert 5-           4 Toe Ext. 5           5 Toe Flex 5                                          REFLEXES:     RIGHT Reflex    LEFT   2+ Biceps 2+   2+ Brachiorad. 2+   2+ Triceps 2+   ++ Pectoralis ++     Jaw Jerk     - Hutchison's -           2+ Patellar 2+   2+ Ankle 2+   - Suprapatellar -                   Down PLANTAR Down               SENSORY:  Sharp touch: decreased sensation in hands and arms, more on the right than the left.  Decreased sensation in the lateral lower legs on both sides.  No stocking pattern.  Vibration: Normal throughout.     Diagnostic Data Reviewed:    Records from her referring provider were reviewed.  She had been seen 3-4 months prior for bilateral lower extremity weakness.  Weakness was predominantly below the knees and worse on the right.  She had noted that she was dragging her foot while she was walking.  Her right leg seemed to be getting weaker in general.  She had had an MRI of the brain recently which was reportedly normal.        Component      Latest Ref Rng & Units 9/18/2018   CPK      20 - 180 U/L 147   Vitamin B12 Bind Capacity      800 - 2600 pg/mL 1119         EMG 9/25/18  Similar to recent electrodiagnostic testing of the bilateral lower extremities on 9/13/18, today's test  results yielded significant findings.  There were signs of active axonal denervation seen in nearly all muscles sampled throughout the bilateral upper extremities.  Additionally, there were similar findings seen in the thoracic paraspinal musculature, as well as complex repetitive discharges seen in the tongue.  Overall, this appears to be a picture of a systemic neurologic disease process.        EMG 9/13/18  I reviewed her recent imaging consisting of MRIs of the lumbar spine and brain.  The studies relatively benign and cannot explain today's electrodiagnostic findings.  Further workup is required.  Brief neurologic exam today revealed bilateral Hutchison's reflexes.  At this point, we will have her return for EMG of the bilateral upper extremities.  I will also order a serum CK level.  We may consider neurology referral pending today's results.     MRI brain 7/25/18:          EXAMINATION:  MRI BRAIN W WO CONTRAST    CLINICAL HISTORY:  right lower ext weakness;.  Other symptoms and signs involving the musculoskeletal system    TECHNIQUE:  Multiplanar multisequence MR imaging of the brain was performed before and after the administration of 5 mL Gadavist intravenous contrast.  Diffusion-weighted imaging was performed.  ADC map was generated.    COMPARISON:  None.    FINDINGS:  Intracranial compartment:    There is no acute intracranial abnormality.  There is minimal volume loss and nonspecific white matter change.  The brain is essentially normal for age.  There is no intracranial hemorrhage or mass/mass effect.  There are no regions of restricted diffusion to suggest acute infarction.  There is no hemorrhage.  There is no pathologic enhancement in the brain or its coverings.  There is no hydrocephalus or midline shift.  The basilar cisterns are open.  Flow voids indicating patency are present in the major vessels at the base of the brain.  The cerebellar tonsils are in normal position at the level of the foramen  magnum.  Pituitary volume is decreased, not unexpected for age.  The orbits are grossly normal.    Skull/extracranial contents:    Marrow signal intensity is normal.  There is a left-sided gustavo bullosa.  There is trace mucosal thickening in the anterior ethmoid air cells.  Otherwise, the paranasal sinuses are clear.  There is a small inferior right mastoid effusion.  The left mastoid air cells are clear.       Impression         1. There is no acute abnormality.  There is minimal volume loss and nonspecific white matter change.  There is no intracranial hemorrhage, mass/mass effect, acute infarction or pathologic enhancement.  Please see above discussion            MRI lumbar spine 7/25/18:          EXAMINATION:  MRI LUMBAR SPINE WITHOUT CONTRAST    CLINICAL HISTORY:  worsening right foot drop; Foot drop, right foot    TECHNIQUE:  Multiplanar, multisequence MR images were acquired from the thoracolumbar junction to the sacrum without the administration of contrast.    COMPARISON:  None.    FINDINGS:  Vertebral column: The lumbar vertebral bodies maintain normal height and alignment.  There is mild degenerative endplate signal change anteriorly at the L2-3, L4-5 and L5-S1 levels where there is mild osteophyte formation.  Baseline marrow signal intensity is normal.  The discs are mildly desiccated but there is no significant disc space narrowing.    Spinal canal, conus, epidural space: The spinal canal is developmentally normal.  The conus terminates at the level of L1 and is normal in contour and signal intensity.  There is no abnormal epidural mass or fluid collection.    Findings by level:    On the sagittal images, the T10-11 and T11-12 levels are normal.    T12-L1: Unremarkable.  There is no spinal canal or significant foraminal stenosis.    L1-2: Unremarkable.  There is no spinal canal or significant foraminal stenosis.    L2-3: There is very mild disc space narrowing.  There is minimal bulging of the annulus  and mild facet joint arthropathy.  There is no spinal canal or significant foraminal stenosis.    L3-4: There is a mild diffuse disc bulge.  There is mild facet joint arthropathy.  There is minimal flattening of the ventral dural sac.  There is no spinal canal or significant foraminal stenosis.    L4-5: There is a mild diffuse disc bulge with mild-to-moderate facet joint arthropathy and ligamentum flavum thickening.  There is no spinal canal or significant foraminal stenosis.    L5-S1: There is a mild diffuse disc bulge with superimposed shallow broad central disc protrusion.  There is mild-to-moderate facet joint arthropathy.  There is no spinal stenosis.  There is mild-to-moderate bilateral foraminal stenosis.    Soft tissues, other: The prevertebral soft tissues are normal.  There is atherosclerosis of the aorta without focal aneurysmal dilatation.  There is subtle abnormal signal within the gallbladder which could be artifactual represent volume averaging.  Alternatively, gallstones could be present.  Correlate clinically.       Impression         1. There is relatively mild multilevel degenerative change in the lumbar spine.  There is no fracture or malalignment.  There is mild-to-moderate bilateral foraminal stenosis at the L5-S1 level.  Otherwise, there may be mild degenerative change but there is no spinal canal or significant foraminal stenosis at the remainder of the lumbar levels.  2. Question cholelithiasis.  There is nonspecific signal in the gallbladder which could be artifactual or represent volume averaging.         Component      Latest Ref Rng & Units 10/8/2018   NMO Interpretive Comments       SEE BELOW   PAVAL TUTU-1, Serum      <1:240 titer Negative   PAVAL reflex test added       None.   PAVAL TUTU-2, Serum      <1:240 titer Negative   PAVAL TUTU-3, Serum      <1:240 titer Negative   PAVAL AGNA-1, Serum      <1:240 titer Negative   PAVAL, PCA-1, Serum      <1:240 titer Negative   PAVAL, PCA-2,  Serum      <1:240 titer Negative   PAVAL, PCA-Tr, Serum      <1:240 titer Negative   PAVAL,  Amphiphysin Ab, Serum      <1:240 titer Negative   CRMP-5 IgG      <1:240 titer Negative   Striated Muscle Ab      <1:120 titer Negative   P/Q Type Calcium Channel Ab      <=0.02 nmol/L 0.00   N-Type Calcium Channel Ab      <=0.03 nmol/L 0.02   AChR Binding Ab, Serum      <=0.02 nmol/L 0.00   AChR Ganglionic Neuronal Ab      <=0.02 nmol/L 0.00   Neuronal (V-G) K+ Channel Ab, Serum      <=0.02 nmol/L 0.00   IgG Monos, Gm2      <=1:500 <1:250   IgM Monos, Gm2      <=1:1000 <1:250   IgG Asialo, Gm2      <=1:4000 <1:250   IgM Asialo, Gm2      <=1:4000 <1:250   IgG Disialo, Gd1B      <=1:1000 <1:250   IgM Disialo, Gd1B      <=1:1000 <1:250   Gangliioside Ab Comment       Test Not Performed   Protein, Serum      6.0 - 8.4 g/dL 6.7   Albumin grams/dl      3.35 - 5.55 g/dL 3.70   Alpha-1 grams/dl      0.17 - 0.41 g/dL 0.36   Alpha-2 grams/dl      0.43 - 0.99 g/dL 0.96   Beta grams/dl      0.50 - 1.10 g/dL 0.94   Gamma grams/dl      0.67 - 1.58 g/dL 0.74   Immunofix Interp.       SEE COMMENT   Sed Rate      0 - 20 mm/Hr 25 (H)   Angio Convert Enzyme      8 - 53 U/L 28   Pathologist Interpretation LIVE       REVIEWED   Pathologist Interpretation SPE       REVIEWED                  Assessment and Plan:  Arianne Johnson is a 78 y.o., right handed woman with hoarse voice, hyperreflexia, right lower extremity foot drop and an abnormal EMG. She has been diagnosed with ALS.  Supportive evidence includes a compelling EMG, negative bloodwork, and a mix of upper and lower motor neuron findings on clinical exam.         She is having increasing difficulty with breathing due to neuromuscular respiratory weakness not secondary to COPD.  She has an abnormal nocturnal oximetry.  She would benefit from Trilogy.  I am ordering nocturnal volume ventilator for severity of condition. Home BiPap will not suffice. She will use trilogy as needed during daytime  as well.     She will also benefit from using a feeding tube for nutrition and medications.  She will need her physicians to see if there are liquid or crushable alternatives to her current medications.  A referral for a G tube will be placed.    She was advised to use her AFO for foot drop to conserve energy when walking.    She is having difficulty with secretion management and excess saliva due to bulbar weakness from ALS.  This places her at risk for aspiration pneumonia and choking.  Will start glycopyrrolate 0.5mg to 1mg three times a day via G tube.    She will follow up in ALS clinic as planned.      This note was created with voice recognition software.  Grammatical, syntax and spelling errors may be inevitable.        Savannah Elaine D.O, ABPN, AOBNP, ABEM

## 2019-05-31 PROBLEM — G70.9 NEUROMUSCULAR RESPIRATORY WEAKNESS: Status: ACTIVE | Noted: 2019-01-01

## 2019-05-31 PROBLEM — J99 NEUROMUSCULAR RESPIRATORY WEAKNESS: Status: ACTIVE | Noted: 2019-01-01

## 2019-06-03 NOTE — TELEPHONE ENCOUNTER
Spoke with pt and notified her to expect a call from [facility] as orders have been placed by Dr. Elaine for placement of G tube and removal of port-a-cath; pt verbalized understanding.

## 2019-06-11 NOTE — TELEPHONE ENCOUNTER
PT Re-Evaluation document received from [facility]. Dr. Woo reviewed and signed.     Returned via fax (Tel: [phone] Fax: [phone]).

## 2019-06-21 PROBLEM — Z95.828 PORT-A-CATH IN PLACE: Status: ACTIVE | Noted: 2019-01-01

## 2019-06-24 NOTE — TELEPHONE ENCOUNTER
Pt had peg tube placed with no orders for nutrition. Pt has had increase issues with swallow, Lucia is getting all her medication in liquid form.  Please put orders for  nutrition in [facility].  Fax to [facility]. Pt is coughing day and night.  Is there something she can do? C/o increase weakness and decrease appetite.  Weight is 108 lb.  is following up with PT.

## 2019-06-26 NOTE — PROGRESS NOTES
"MNT Feeding Tube Recommendations:    Pt will obtain adequate calories/protein via commercial standaridzed formula Isosource 1.5    Wt:  49.4 kg  Estimated calorie needs:  1482 calories (30 mazin/kg/day)  Estimated protein needs:  49 gm (1.0 gm/kg/day)  Estimated fluid needs:  1482 ml (1 ml/mazin)    When medically able, initiate tube feeding via PEG with Isosource 1.5 (or equivalent) - bolus feeds as tolerated.  Goal is to provide   4 (cartons daily) + free water flushes before and after feeds.    -to establish tolerance, start with 3 cartons (1 per "meal") daily.  Advance to 4 cartons as tolerated 1 in am, 2 in afternoon, 1 in pm.  Flush tube with 120 ml water before and after each feed.    4 cartons Isosource 1.5 daily will provide 1500 kcal, 68 gm protein, 19.0 gm dietary fiber, 764 ml free water + additional water from flushes.    Goal:  Pt will receive adequate nutrition to meet assessed needs within 48-72 hours of initiating tube feeding.    The home health agency will provide education on site care, enteral nutrition administration, etc; the vendor (ie [place]) will provide actual enteral nutrition formula.    "

## 2019-06-27 NOTE — TELEPHONE ENCOUNTER
Outgoing e-mail to obtain dates for patient voice banking. E-mail from Elli Marcelo, Voice banking Student Coordinator, to inform patient has appt this Sunday, 6/30/19.     Also, message from Lucia to notify patient receiving enteral nutrition through [facility] (Tel: [phone]). Kyree Sal is the .

## 2019-06-27 NOTE — TELEPHONE ENCOUNTER
As per our earlier discussion, I will be sending this info to Kristy with [facility]. I will let you know if we need to do anything else. Thank you.

## 2019-06-27 NOTE — TELEPHONE ENCOUNTER
Incoming email from Marta Roa with [employer]:      Boyd Dutton`!  I am working with Arianne Wylie SLP, Nai Villalta, to get her equipment shipped. Nai is going to be moving out of [place] in a few weeks and hopes to have Ms. Johnson trained prior to her departure. Shes still working on a couple of changes to the funding report so it will be a day or so before we can request the Rx. Can you send me her last clinic note though? Her  is 44.  Amy attached her release.  I would appreciate any help! Thanks!    Marta    Attached Lifetime Release and Assignment of Benefits Payment Agreement sent to scan. Emailed last clinic note to Marta.

## 2019-06-28 NOTE — TELEPHONE ENCOUNTER
----- Message from Tramaine Bloom sent at 6/28/2019 12:54 PM CDT -----  Contact: Marti Kidd called to follow up on the DME speech assitance for the patient. Please call and advise.    Fax #: [phone]  Call Back #: [phone]  Thanks

## 2019-06-28 NOTE — TELEPHONE ENCOUNTER
Incoming e-mail from Marta with [employer]:     Boyd Mas,  Amy attached the prescription for Ms. Johnson. Can you have Dr. Woo complete it please? Ailyn is out today so she couldnt generate a prescription from our system. Can you ask Dr. Woo to complete his name, NPI, signature and date? Once we have this back, we will overnight Ms. Johnsons equipment to her.   Thanks for your help!  [facility]    Order reviewed and signed by Dr. Woo. Sent completed document to Marta via e-mail.

## 2019-07-11 NOTE — TELEPHONE ENCOUNTER
Contacted by home care team that patient has been having difficulty managing her secretions.  Would like to try something to dry this up some.  Will start glycopyrrolate 1mg, take 1/2 to 1 pill via g-tube every 6-8 hours as needed.

## 2019-07-17 NOTE — TELEPHONE ENCOUNTER
Returned phone call to May with appeals dept at [facility]; left voicemail notifying that I would be faxing appeal documentation quickly for pt due to diagnosis. Instructed to call me if she has any questions or concerns.

## 2019-07-17 NOTE — TELEPHONE ENCOUNTER
----- Message from Liang Patel sent at 7/17/2019  1:40 PM CDT -----  Contact: [facility] Appeal Dept-May  May called in regarding the attached patient and her Rx for glycopyrrolate (ROBINUL) 1 mg Tab.  May stated she will be faxing over some additional information that is needed.    Call back number is [phone], ext [phone]

## 2019-07-19 NOTE — TELEPHONE ENCOUNTER
Spoke with Gabi with [facility] to complete verbal appeal for glycopyrolate for pts increased secretions; states that she will be sending the expedited appeal back to pharmacy to discuss and review further.

## 2019-07-23 NOTE — TELEPHONE ENCOUNTER
Outgoing call to Mrs. Johnson. Spoke with her daughter, Geena, that confirmed ALS Clinic for tomorrow, 7/24/19.

## 2019-07-24 NOTE — PROGRESS NOTES
"CHIEF COMPLAINT:  Follow-up    HISTORY OF PRESENT ILLNESS: Arianne Johnson is a 79 y.o. female with onset of shortness of breath ~one year ago, treated empirically for asthma without significant improvement.  Onset of dz with 3/26/18 with right "foot slap".  Progressed to dyspnea with activities of daily living. Did not tolerate trilogy FM due to claustrophobia.  Using cough assist once per day.  Coughing frequently during day.  No orthopnea.  Ambulating with rollator.  S/p G tube placement under GA with intubation.  Location of tube not comfortable - cannot wear bra. Using for nutrition 3x/day.      PAST MEDICAL HISTORY:    Past Medical History:   Diagnosis Date    Abnormal Pap smear     repeats were normal    ALS (amyotrophic lateral sclerosis)     Asthma     Breast disorder     benign left breast biopsy    COPD (chronic obstructive pulmonary disease)     due to second hand smoke    Encounter for blood transfusion     GERD (gastroesophageal reflux disease)     Hay fever     Hyperlipidemia     PONV (postoperative nausea and vomiting)     Seizures 02/1960    after giving birth, none since       PAST SURGICAL HISTORY:    Past Surgical History:   Procedure Laterality Date    APPENDECTOMY      BREAST BIOPSY Left     benign    COLONOSCOPY N/A 2/21/2017    [facility]    DILATION AND CURETTAGE OF UTERUS      EGD, WITH PEG TUBE INSERTION N/A 6/21/2019    [facility]    EXPLORATORY LAPAROTOMY      [id number] Left 12/21/2018    Per[facility] Rafael Cleaning [facility] at [facility]    REMOVAL, CATHETER, CENTRAL VENOUS, TUNNELED, WITH PORT N/A 6/21/2019    [facility]    TONSILLECTOMY, ADENOIDECTOMY      TOTAL ABDOMINAL HYSTERECTOMY         FAMILY HISTORY:                Family History   Problem Relation Age of Onset    Breast cancer Maternal Aunt     Cancer Daughter         adenocarcinoma of lung " with mets to brain    Cancer Grandchild         epitheloid angiosarcoma    Ovarian cancer Neg Hx        SOCIAL HISTORY:          Occupation / Environment: no occupational exposures  Social support: lives in [place],   recently (cancer), daughter Geena and  moved in to care for her.   Social History     Tobacco Use   Smoking Status Never Smoker   Smokeless Tobacco Never Used       ALLERGIES:    Review of patient's allergies indicates:   Allergen Reactions    Codeine Nausea Only    Demerol [meperidine]      nausea    Sulfa (sulfonamide antibiotics) Nausea Only       CURRENT MEDICATIONS:    Current Outpatient Medications   Medication Sig Dispense Refill    albuterol (ACCUNEB) 1.25 mg/3 mL Nebu Take 3 mLs (1.25 mg total) by nebulization every 6 (six) hours as needed. Rescue 1 Box 5    atorvastatin (LIPITOR) 40 MG tablet TAKE 1 TABLET(40 MG) BY MOUTH EVERY DAY 90 tablet 0    benzonatate (TESSALON) 200 MG capsule Take 1 capsule (200 mg total) by mouth 2 (two) times daily as needed for Cough. 20 capsule 0    cetirizine (ZYRTEC) 10 MG tablet Take 1 tablet (10 mg total) by mouth once daily.  11    fluticasone (FLONASE) 50 mcg/actuation nasal spray daily as needed.       glycopyrrolate (ROBINUL) 1 mg Tab 1 tablet (1 mg total) by Per G Tube route every 8 (eight) hours as needed (excessive secretions). Try 1/2 to 1 pill every 6-8 hours. 90 tablet 11    HYDROcodone-acetaminophen (NORCO) 5-325 mg per tablet Take 1 tablet by mouth every 6 (six) hours as needed for Pain. 10 tablet 0    ibandronate (BONIVA) 150 mg tablet TAKE 1 TABLET(150 MG) BY MOUTH EVERY 30 DAYS (Patient taking differently: TAKE 1 TABLET(150 MG) BY MOUTH EVERY 30 DAYS (on 15th of each month)) 1 tablet 11    lactose-reduced food with fibr (ISOSOURCE 1.5 VIRGIE) 0.07 gram-1.5 kcal/mL Liqd 4 Bottles by FEEDING TUBE route once daily. 120 Bottle 11    meclizine (ANTIVERT) 25 mg tablet Take 1 tablet (25 mg total) by mouth 3 (three) times  daily as needed. 30 tablet 3    montelukast (SINGULAIR) 10 mg tablet every evening.       omeprazole (PRILOSEC) 40 MG capsule TAKE 1 CAPSULE(40 MG) BY MOUTH EVERY DAY 30 capsule 11    ondansetron (ZOFRAN-ODT) 8 MG TbDL Take 1 tablet (8 mg total) by mouth every 6 (six) hours as needed. 10 tablet 0    PROAIR HFA 90 mcg/actuation inhaler daily as needed.       riluzole 50 mg Tab tablet Take 1 tablet (50 mg total) by mouth every 12 (twelve) hours. 60 tablet 11    senna-docusate 8.6-50 mg (PERICOLACE) 8.6-50 mg per tablet Take 1 tablet by mouth once daily.       No current facility-administered medications for this visit.                   REVIEW OF SYSTEMS:   Pulmonary related symptoms as per HPI.  Gen:  weight loss reversing, no fever, no night sweats  HEENT:  Mild sinus congestion, + dysphagia, + voice changes - hoarseness  CV:  no chest pain, no orthopnea, no paroxysmal nocturnal dyspnea  GI:  no melena, no hematochezia, no diarrhea, no constipation.  :  no dysuria, no hematuria, no incontinence  Neuro:  no syncope, + focal weakness, ambulatory with rollator  Sleep:  rested     PHYSICAL EXAM:   Respiratory Rate: 28 NIV during clinic visit?  N  Vitals:    07/24/19 0753   BP: 109/64   Pulse: 109   Weight: 48.8 kg (107 lb 9.4 oz)   Height: 5' (1.524 m)   PainSc: 0-No pain     GENERAL:  Alert, calm, in no  distress  HEENT:  Normal pupils, normal conjunctiva, normal EOM, nasal and oral mucosa normal, tongue + fasciculations (tr)  NECK:  supple; no palpable lymphadenopathy or masses,no jugular venous distention.  CVS: regular rate and rhythm, no murmers, gallops or rubs  PULM: Grossly decreased vital capacity, normal to percussion and palpation, clear to auscultation bilaterally with no wheezes, crackles or rhonchi, + accessory muscle use with inspiratory effort  ABDOMEN:  soft nontender/nondistended, minimal rise with inspiration, decreased contraction with cough  EXTREMITIES no cyanosis, clubbing or edema  NEURO:   Focal weakness, ambulatory w rollator    CONTRIBUTORY STUDIES:     PULMONARY FUNCTION TESTS: FVC 27%    O2 96%    ACTIVE PULMONARY PROBLEMS:    ICD-10-CM ICD-9-CM    1. ALS (amyotrophic lateral sclerosis) G12.21 335.20    2. Impaired mobility and ADLs Z74.09 799.89    3. Dysarthria R47.1 784.51    4. Dysphagia, unspecified type R13.10 787.20    5. Bilateral foot-drop M21.371 736.79     M21.372     6. Muscle cramp R25.2 729.82        ASSESSMENT&PLAN:  1.  Chronic neuromuscular respiratory failure in setting of ALS.  Not yet tolerating trilogy ventilator with advanced modes (AVAPS AE, PC). Will request nasal pillows and S/T 10/4 for daytime compliance  2.  Decreased cough efficacy - increase frequency of use to support cough  3.  Sinus congestion - taking cetirizine, montelukast, flonase   4.  Goals of care - Mrs. Johnson does not wish to undergo CPR or be intubated.  Selina completed.      No follow-ups on file.      [Greater than 50% of this 35 minute visit spent counseling patient and family]

## 2019-07-24 NOTE — PROGRESS NOTES
OT Screen  HH ST and PT, OT HH to start in 2 weeks.    Name: Arianne Johnson  MRN: [id number]     Physician: Savannah Elaine DO    Diagnosis: ALS, impaired ADL's    Onset date: Diagnosed on 10/25/2018    Orders: Eval and Treat    Subjective:  Patient goals: Improve mobility  Chief Complaint:   Fall safety   Precautions: Standard and fall  Social Hx: Retired lives with her daughter and son-in-law    DME: Quad cane and grab bars in bathroom, rollator, 3 in 1 commode  She is waiting on wheelchair approval. Medicare is asking for more info for approval.     Pain: no pain reported today    Objective  Cognitive Exam  Oriented: Person, Place, Time and Situation  Behaviors: Follows multistep  commands  Follows Commands/attention: Follows multistep  commands  Communication: clear/fluent  Memory: No Deficits noted  Safety awareness/insight to disability: Patient appears aware of diagnosis and insight into disability  Coping skills/emotional control: Appropriate to situation      Physical Exam:  Postural examination/scapula alignment: moderate fixed kyphosis  Joint integrity: Firm end feeling  Skin integrity: Grossly intact  Edema: None noted  Palpation: N/A    Sensation: Arianne reports intact sensation  Light touch:  intact  Sharp/Dull:  intact  Kinesthesia: intact  Proprioception:  intact  Temperature:  intact    Joint evaluation:  WFL bilateral upper extremities    Strength:  RUE and LUE-  Shoulder flexion 4-  Shoulder adduction: 4-  Pronation: 4-  Wrist flexion/extension: 4+  All other areas bilaterally: 5       Fine motor coordination: 9 hole peg test  Date 11/28/2018 4/10/19 7/24/19   R hand(secs) 30 38 69   L hand (secs) 35 42 50     Tone:  Modified Casandra Scale:   0 - No increase in muscle tone    Balance:   Static Sit: Fair, sits in slumped posture  Dynamic sit: Poor    Functional Status: Mod I    Functional Mobility:slow and effortful  Bed mobility: Mod I  Roll to left: Mod I  Roll to right:Mod I  Supine to sit:  Mod I  Sit to supine: Mod I  Transfers to bed:Mod I  Transfers to toilet:Mod I  Car transfers: I  Wheelchair mobility: Pt. Needs power wheelchair to help conserve energy  Pt. Unable to do effective pressure relief due to poor UB strength and poor trunk strength.   ADL's:  Feeding: I  Grooming: I  Hygiene: I  UB Dressing: Mod I; difficulty with buttons  LB Dressing: Mod I; difficulty with buttons  Toileting: I  Bathing: Mod I; lowers self and exits bathtub using grab bars but recently fell. Info given about tub lift.     IADL's:  Homecare: Max A  Cooking: Max A  Laundry: Max A  Yard work: D  Use of telephone: Mod I  Money management: Mod I  Medication management: Mod I  TODAY'S TREATMENT:  Worked on discovering delay in wheelchair and what can be done to complete process.   ASSESSMENT:  OT diagnosis: decreased ability to perform ADLs such as dressing and safe bathing and IADLs such as cooking and homecare.   Arianne Johnson is a 79 y.o. female with a medical diagnosis of   ALS referred to occupational therapy for wheelchair eval and eval in ALS clinic. She presents with decreased endurance, decreased manual dexterity, decrease strength in bilateral UEs, decreased ability to complete ADLs/IADLs. She has had a decrease in fine motor function since last visit.   PLAN:    Patient/caregiver understands and agrees with plan of care. Follow in ALS clinic.

## 2019-07-24 NOTE — PROGRESS NOTES
NEUROLOGY  [facility]  [address] [place]  [address]  [phone] (office) / [phone] (fax)    Patient Name:  Arianne Johnson  :  1940  MR #:  [id number]  [id number]    Date of Clinic Follow Up: 2019  Name of Neurologist: Savannah Elaine D.O, ABPN, AOBNP, ABEM    Other Physicians:  Chiki Paniagua MD (Primary Care Physician); No ref. provider found (Referring)    Subjective:       Patient ID: Arianne Johnson is a 79 y.o. female.    Chief Complaint:  Follow-up      History of Present Illness  Arianne Johnson is a 79 y.o. female here today for follow up of motor neuron disease.    Here with: daughter    Strength:  She has not noted declining strength in her lower extremities.  She feels her hands have changed a lot.  More forward flexion of the neck.    Dysarthria:  Her speech is hoarse but understandable.    Dysphagia: She tries to take small bites and follow up with liquid.  Sometimes water will make her choke.  She puts most things through the feeding tube, three feedings a day.  Her medications go through the tube as well.      Sialorrhea:  She is not drooling a lot, but has a lot of increased secretions.  Glycopyrrolate makes her feel off balance so she only uses this twice a day.  She is using Tessalon Perles for cough after her Gtube placement.     Constipation:  She denies    Sleep:  She sleeps well.    Gait:  She is using a rollator, but needs a power chair for safety and autonomy.    Falls:  She has not fallen recently.    Breathing:  She is fatiguing very easily.  She has had some shallow breathing.  She feels the humidity is an issue.  She will get short of breath with rest.  She does get some morning headaches.  She states she has one right now, these go away as the day goes on.     Bladder:  She denies.    Pain:  She denies.  She does get some muscle cramps.      Mood:  She feels her mood has been pretty good.    PBA:  She denies.    Memory:  She feels her memory  "is "fair", but her family states there is nothing that has been concerning.      Treatment to date:   Riluzole  Jevity 1.5  Radicava - stopped due to feeling worse       Review of Systems  See HPI    Past Medical, Surgical, Family & Social History:   Reviewed and updated.    Home Medications:     Current Outpatient Medications:     albuterol (ACCUNEB) 1.25 mg/3 mL Nebu, Take 3 mLs (1.25 mg total) by nebulization every 6 (six) hours as needed. Rescue, Disp: 1 Box, Rfl: 5    atorvastatin (LIPITOR) 40 MG tablet, TAKE 1 TABLET(40 MG) BY MOUTH EVERY DAY, Disp: 90 tablet, Rfl: 0    benzonatate (TESSALON) 200 MG capsule, Take 1 capsule (200 mg total) by mouth 2 (two) times daily as needed for Cough., Disp: 20 capsule, Rfl: 0    cetirizine (ZYRTEC) 10 MG tablet, Take 1 tablet (10 mg total) by mouth once daily., Disp: , Rfl: 11    fluticasone (FLONASE) 50 mcg/actuation nasal spray, daily as needed. , Disp: , Rfl:     glycopyrrolate (ROBINUL) 1 mg Tab, 1 tablet (1 mg total) by Per G Tube route every 8 (eight) hours as needed (excessive secretions). Try 1/2 to 1 pill every 6-8 hours., Disp: 90 tablet, Rfl: 11    HYDROcodone-acetaminophen (NORCO) 5-325 mg per tablet, Take 1 tablet by mouth every 6 (six) hours as needed for Pain., Disp: 10 tablet, Rfl: 0    ibandronate (BONIVA) 150 mg tablet, TAKE 1 TABLET(150 MG) BY MOUTH EVERY 30 DAYS (Patient taking differently: TAKE 1 TABLET(150 MG) BY MOUTH EVERY 30 DAYS (on 15th of each month)), Disp: 1 tablet, Rfl: 11    lactose-reduced food with fibr (ISOSOURCE 1.5 VIRGIE) 0.07 gram-1.5 kcal/mL Liqd, 4 Bottles by FEEDING TUBE route once daily., Disp: 120 Bottle, Rfl: 11    meclizine (ANTIVERT) 25 mg tablet, Take 1 tablet (25 mg total) by mouth 3 (three) times daily as needed., Disp: 30 tablet, Rfl: 3    montelukast (SINGULAIR) 10 mg tablet, every evening. , Disp: , Rfl:     omeprazole (PRILOSEC) 40 MG capsule, TAKE 1 CAPSULE(40 MG) BY MOUTH EVERY DAY, Disp: 30 capsule, Rfl: " 11    ondansetron (ZOFRAN-ODT) 8 MG TbDL, Take 1 tablet (8 mg total) by mouth every 6 (six) hours as needed., Disp: 10 tablet, Rfl: 0    PROAIR HFA 90 mcg/actuation inhaler, daily as needed. , Disp: , Rfl:     riluzole 50 mg Tab tablet, Take 1 tablet (50 mg total) by mouth every 12 (twelve) hours., Disp: 60 tablet, Rfl: 11    senna-docusate 8.6-50 mg (PERICOLACE) 8.6-50 mg per tablet, Take 1 tablet by mouth once daily., Disp: , Rfl:     Objective:       ALS Physical Exam PBA Speech Facial Strength Tongue Strength Tongue Appear Jaw Jerk Limb Fasciculations   7/24/2019 No mild normal normal atrophied Yes Absent      ALS Physical Exam (Continued) Neck Flex HHD Neck Flex MMT Neck Ext HHD Neck Ext MMT Shd Abd R HHD Shd Abd R MMT Shd Abd L HHD   7/24/2019 7.8 4+ 7.9 4+ 5.6 3 7.9      ALS Physical Exam (Continued) Shd Abd L MMT Wrist Ext R HHD Wrist Ext R MMT Wrist Ext L HHD Wrist Ext L MMT Hand  R (kg) Hand  L (kg)   7/24/2019 4 9 5 8.2 5 8 8      ALS Physical Exam (Continued) Hip Flex R HHD Hip Flex R MMT Hip Flex L HHD Hip Flex L MMT Knee Ext R HHD Knee Ext R MMT Knee Ext L HHD   7/24/2019 10.5 4- 12.8 4+ 12.6 5 12.7      ALS Physical Exam (Continued) Knee Ext L MMT Foot DF R HHD Foot DF R MMT Foot DF L HHD Foot DF L MMT UMN Signs Arms Type UMN Signs Legs   7/24/2019 5 0 0 8.3 4 hyper-reflexia Yes      ALS Physical Exam (Continued) UMN Signs Legs Type   7/24/2019 hyper-reflexia     GENERAL:  General appearance: Well, non-toxic appearing.  No apparent distress.  Respiratory:  Normal.  Extremities: Normal.     MENTAL STATUS:  Alertness, attention span & concentration: normal.  Language: normal.  Orientation to self, place & time:  normal.  Memory, recent & remote: normal.  Fund of knowledge: normal.     SPEECH:  Hoarse and fluent.  Follows complex commands.     CRANIAL NERVES:  Cranial Nerves II-XII were examined.  II - Visual fields: normal.  III, IV, VI: PERRL, EOMI, No ptosis, No nystagmus.  V - Facial  sensation: normal.  VII - Face symmetry & mobility: normal.  VIII - Hearing: normal.  IX, X - Palate: elevates on left more than right.  XI - Shoulder shrug: normal.  XII - Tongue protrusion: atrophy.     GROSS MOTOR:  Gait & station:needs assist with bilateral foot drop.  Tone: normal.  Abnormal movements: none.  Coord exam without ataxia     MUSCLE STRENGTH:    See table above     REFLEXES:     RIGHT Reflex    LEFT   3 Biceps 3   3 Brachiorad. 3   3 Triceps 3   ++ Pectoralis ++    ++ Jaw Jerk ++    - Hutchison's -           3 Patellar 3   3 Ankle 3   + Suprapatellar +                   Down PLANTAR Down               SENSORY:  Sharp touch: decreased sensation in hands and arms, more on the right than the left.  Decreased sensation in the lateral lower legs on both sides.  No stocking pattern.    Diagnostic Data Reviewed:   From her initial records, she had been seen 3-4 months prior for bilateral lower extremity weakness.  Weakness was predominantly below the knees and worse on the right.  She had noted that she was dragging her foot while she was walking.  Her right leg seemed to be getting weaker in general.  She had had an MRI of the brain recently which was reportedly normal.        Component      Latest Ref Rng & Units 9/18/2018   CPK      20 - 180 U/L 147   Vitamin B12 Bind Capacity      800 - 2600 pg/mL 1119         EMG 9/25/18  Similar to recent electrodiagnostic testing of the bilateral lower extremities on 9/13/18, today's test results yielded significant findings.  There were signs of active axonal denervation seen in nearly all muscles sampled throughout the bilateral upper extremities.  Additionally, there were similar findings seen in the thoracic paraspinal musculature, as well as complex repetitive discharges seen in the tongue.  Overall, this appears to be a picture of a systemic neurologic disease process.        EMG 9/13/18  I reviewed her recent imaging consisting of MRIs of the lumbar spine and  brain.  The studies relatively benign and cannot explain today's electrodiagnostic findings.  Further workup is required.  Brief neurologic exam today revealed bilateral Hutchison's reflexes.  At this point, we will have her return for EMG of the bilateral upper extremities.  I will also order a serum CK level.  We may consider neurology referral pending today's results.     MRI brain 7/25/18:          EXAMINATION:  MRI BRAIN W WO CONTRAST    CLINICAL HISTORY:  right lower ext weakness;.  Other symptoms and signs involving the musculoskeletal system    TECHNIQUE:  Multiplanar multisequence MR imaging of the brain was performed before and after the administration of 5 mL Gadavist intravenous contrast.  Diffusion-weighted imaging was performed.  ADC map was generated.    COMPARISON:  None.    FINDINGS:  Intracranial compartment:    There is no acute intracranial abnormality.  There is minimal volume loss and nonspecific white matter change.  The brain is essentially normal for age.  There is no intracranial hemorrhage or mass/mass effect.  There are no regions of restricted diffusion to suggest acute infarction.  There is no hemorrhage.  There is no pathologic enhancement in the brain or its coverings.  There is no hydrocephalus or midline shift.  The basilar cisterns are open.  Flow voids indicating patency are present in the major vessels at the base of the brain.  The cerebellar tonsils are in normal position at the level of the foramen magnum.  Pituitary volume is decreased, not unexpected for age.  The orbits are grossly normal.    Skull/extracranial contents:    Marrow signal intensity is normal.  There is a left-sided gustavo bullosa.  There is trace mucosal thickening in the anterior ethmoid air cells.  Otherwise, the paranasal sinuses are clear.  There is a small inferior right mastoid effusion.  The left mastoid air cells are clear.       Impression         1. There is no acute abnormality.  There is minimal  volume loss and nonspecific white matter change.  There is no intracranial hemorrhage, mass/mass effect, acute infarction or pathologic enhancement.  Please see above discussion            MRI lumbar spine 7/25/18:          EXAMINATION:  MRI LUMBAR SPINE WITHOUT CONTRAST    CLINICAL HISTORY:  worsening right foot drop; Foot drop, right foot    TECHNIQUE:  Multiplanar, multisequence MR images were acquired from the thoracolumbar junction to the sacrum without the administration of contrast.    COMPARISON:  None.    FINDINGS:  Vertebral column: The lumbar vertebral bodies maintain normal height and alignment.  There is mild degenerative endplate signal change anteriorly at the L2-3, L4-5 and L5-S1 levels where there is mild osteophyte formation.  Baseline marrow signal intensity is normal.  The discs are mildly desiccated but there is no significant disc space narrowing.    Spinal canal, conus, epidural space: The spinal canal is developmentally normal.  The conus terminates at the level of L1 and is normal in contour and signal intensity.  There is no abnormal epidural mass or fluid collection.    Findings by level:    On the sagittal images, the T10-11 and T11-12 levels are normal.    T12-L1: Unremarkable.  There is no spinal canal or significant foraminal stenosis.    L1-2: Unremarkable.  There is no spinal canal or significant foraminal stenosis.    L2-3: There is very mild disc space narrowing.  There is minimal bulging of the annulus and mild facet joint arthropathy.  There is no spinal canal or significant foraminal stenosis.    L3-4: There is a mild diffuse disc bulge.  There is mild facet joint arthropathy.  There is minimal flattening of the ventral dural sac.  There is no spinal canal or significant foraminal stenosis.    L4-5: There is a mild diffuse disc bulge with mild-to-moderate facet joint arthropathy and ligamentum flavum thickening.  There is no spinal canal or significant foraminal  stenosis.    L5-S1: There is a mild diffuse disc bulge with superimposed shallow broad central disc protrusion.  There is mild-to-moderate facet joint arthropathy.  There is no spinal stenosis.  There is mild-to-moderate bilateral foraminal stenosis.    Soft tissues, other: The prevertebral soft tissues are normal.  There is atherosclerosis of the aorta without focal aneurysmal dilatation.  There is subtle abnormal signal within the gallbladder which could be artifactual represent volume averaging.  Alternatively, gallstones could be present.  Correlate clinically.       Impression         1. There is relatively mild multilevel degenerative change in the lumbar spine.  There is no fracture or malalignment.  There is mild-to-moderate bilateral foraminal stenosis at the L5-S1 level.  Otherwise, there may be mild degenerative change but there is no spinal canal or significant foraminal stenosis at the remainder of the lumbar levels.  2. Question cholelithiasis.  There is nonspecific signal in the gallbladder which could be artifactual or represent volume averaging.         Component      Latest Ref Rng & Units 10/8/2018   NMO Interpretive Comments       SEE BELOW   PAVAL TUTU-1, Serum      <1:240 titer Negative   PAVAL reflex test added       None.   PAVAL TUTU-2, Serum      <1:240 titer Negative   PAVAL TUTU-3, Serum      <1:240 titer Negative   PAVAL AGNA-1, Serum      <1:240 titer Negative   PAVAL, PCA-1, Serum      <1:240 titer Negative   PAVAL, PCA-2, Serum      <1:240 titer Negative   PAVAL, PCA-Tr, Serum      <1:240 titer Negative   PAVAL,  Amphiphysin Ab, Serum      <1:240 titer Negative   CRMP-5 IgG      <1:240 titer Negative   Striated Muscle Ab      <1:120 titer Negative   P/Q Type Calcium Channel Ab      <=0.02 nmol/L 0.00   N-Type Calcium Channel Ab      <=0.03 nmol/L 0.02   AChR Binding Ab, Serum      <=0.02 nmol/L 0.00   AChR Ganglionic Neuronal Ab      <=0.02 nmol/L 0.00   Neuronal (V-G) K+ Channel Ab,  Serum      <=0.02 nmol/L 0.00   IgG Monos, Gm2      <=1:500 <1:250   IgM Monos, Gm2      <=1:1000 <1:250   IgG Asialo, Gm2      <=1:4000 <1:250   IgM Asialo, Gm2      <=1:4000 <1:250   IgG Disialo, Gd1B      <=1:1000 <1:250   IgM Disialo, Gd1B      <=1:1000 <1:250   Gangliioside Ab Comment       Test Not Performed   Protein, Serum      6.0 - 8.4 g/dL 6.7   Albumin grams/dl      3.35 - 5.55 g/dL 3.70   Alpha-1 grams/dl      0.17 - 0.41 g/dL 0.36   Alpha-2 grams/dl      0.43 - 0.99 g/dL 0.96   Beta grams/dl      0.50 - 1.10 g/dL 0.94   Gamma grams/dl      0.67 - 1.58 g/dL 0.74   Immunofix Interp.       SEE COMMENT   Sed Rate      0 - 20 mm/Hr 25 (H)   Angio Convert Enzyme      8 - 53 U/L 28   Pathologist Interpretation LIVE       REVIEWED   Pathologist Interpretation SPE       REVIEWED          Impression and Plan:     Arianne Johnson is a 79 y.o.female here for follow up of ALS.  I and the members of the multidisciplinary team have assessed Arianne Johnson and have made the following recommendations:    Problem List Items Addressed This Visit        Neuro    ALS (amyotrophic lateral sclerosis) - Primary    Overview     Diagnosed in 2018  On Riluzole  Tried Radicava but stopped after 2 infusions - felt worse         Current Assessment & Plan     Continue Rilutek  Labs         Relevant Orders    CBC auto differential (Completed)    Comprehensive metabolic panel (Completed)    SUBSEQUENT HOME HEALTH ORDERS    IR Gastrostomy Tube Change w/o Img w/ Rev of Gastro Tract    Dysarthria    Current Assessment & Plan     Home health ST to continue  [employer] training  Will need a voice amplifier.         Relevant Orders    SUBSEQUENT HOME HEALTH ORDERS    IR Gastrostomy Tube Change w/o Img w/ Rev of Gastro Tract    Bilateral foot-drop    Relevant Orders    SUBSEQUENT HOME HEALTH ORDERS    IR Gastrostomy Tube Change w/o Img w/ Rev of Gastro Tract       Pulmonary    Neuromuscular respiratory weakness    Current Assessment & Plan      Nasal pillows with low setting on Trilogy            GI    Dysphagia    Current Assessment & Plan     Continue Home Health ST  Modify Diet  Referral for transition to Jim-key valve for G tube         Relevant Orders    SUBSEQUENT HOME HEALTH ORDERS    IR Gastrostomy Tube Change w/o Img w/ Rev of Gastro Tract       Other    Impaired mobility and ADLs    Current Assessment & Plan     Soft collar for neck weakness  Home health PT continues, needs stretching    The patient was seen today for mobility evaluation for a power mobility device due to significant impairment at home.  - The patient has gait impairment and is unable to use a walker consistently more than 20-30 ft , due to BLE weakness and fatigue secondary to weakness from ALS. She also has impaired balance due to ALS.  - The patient is not able to ambulate safely to the kitchen or living room.  - The patient is unable to use an optimally-configured manual wheelchair in the home in order to perform Mobility Related Activities of Daily Living, due to BUE weakness & fatigue. She also has dyspnea due to respiratory muscle fatigue and to asthma.  - The patient's intact cognition should be able to use a power mobility device well at home.  - The patient was given a prescription for a power wheelchair.  - A scooter would not be appropriate due the patient's difficulty controlling the scooter tiller due to hand weakness & fatigue and to maneuverability restrictions at home.   - This will allow the patient to go safely to the kitchen, dining room or living room for feeding & socialization.  - The patient is to return the neurology clinic in 3 months.             Relevant Orders    SUBSEQUENT HOME HEALTH ORDERS    IR Gastrostomy Tube Change w/o Img w/ Rev of Gastro Tract    Muscle cramp    Relevant Orders    SUBSEQUENT HOME HEALTH ORDERS    IR Gastrostomy Tube Change w/o Img w/ Rev of Gastro Tract            The patient will return to clinic in 3 months.    This note  was created with voice recognition software.  Grammatical, syntax and spelling errors may be inevitable.        Savannah Elaine D.O, ABPN, AOBNP, ABEM

## 2019-07-24 NOTE — PROGRESS NOTES
Pt is a 79 y/o w/w/f seen today in ALS Clinic for 3rd clinic visit.  Pt is alert and oriented to person, place, and time.  She responds appropriately during conversation.  Voice is raspy, speaks a little low.  Pt reports the more she speaks the more tired her voice gets.  Pt's family present for clinic visit dtr Geena. Pt lives at home with her dtr Geena and son in law Saraavnan.  Pt's spouse  2018.  Pt reports she requires some assistance with adls.  No falls reported this visit. Pt uses rollator to ambulate.  Pt continues to c/o about LE weakness and fatigue. Pt reports she uses the cane only in bathroom as she is unable to get rollator in the bathroom (too small).  Pt/dtr indicated pt chooses to remain home most of the time d/t being fatigued from walking. Pt has support of her children; Geena, Joe, Mitesh, Jimena, Gita, Tutu ( 3/2018) as well as grandchildren.  Pt receives [employer] survivors benefits from spouse and his assisted.   Pt is a financial denial for Medicaid therefore making her ineligible for community choice waiver.   Pt is followed by [facility] for PT, SN 1XWK (once PT is done, will switch to OT).  PT/dtr indicated since pt had PEG placement pt's voice is worse additionally, pt/dtr verbalized concern with where the peg was placed, under her left breast.  Pt/dtr indicated it's uncomfortable and appears it's taking longer to heal.  VIRY encouraged pt/dtr to discuss with MD.  VIRY also made [facility] aware.  Pt/family verbalized no needs at this time.          JANES COOK

## 2019-07-24 NOTE — PROGRESS NOTES
Subjective:       Patient ID: Arianne Johnson is a 79 y.o. female.    Chief Complaint: Follow-up    HPI     HISTORY OF PRESENT ILLNESS:  Ms. Vaca is a 79-year-old white female  Followed up in the ALS Clinic for  She has a history of ALS with dysarthria,   dysphagia and upper and lower extremity weakness.  She is followed up by the Physical Medicine Service for pain management. Her last visit was on 4/10/19. Her lumbar MRI showed mild multilevel degenerative changes with mild-to-moderate bilateral neural foraminal stenosis at L5-S1.  However, the patient denied any back pain or radicular pain.    The patient is coming to the ALS clinic for follow up.  She still denies any back pain or radicular pain.  She continues to complain of spasms in arms, hands and legs. They happen few times per day. They usually last few seconds. They are usually not painful. Her hand spasms are relieved by stretching her fingers.      Review of Systems   Constitutional: Positive for fatigue.   HENT: Positive for trouble swallowing.    Eyes: Negative for visual disturbance.   Respiratory: Positive for shortness of breath.    Cardiovascular: Negative for chest pain.   Gastrointestinal: Negative for constipation, nausea and vomiting.   Genitourinary: Negative for difficulty urinating.   Musculoskeletal: Positive for gait problem. Negative for back pain and neck pain.   Neurological: Positive for speech difficulty and weakness. Negative for dizziness and headaches.   Psychiatric/Behavioral: Negative for behavioral problems and sleep disturbance.       Objective:      Physical Exam   Constitutional: She is oriented to person, place, and time. She appears well-developed and well-nourished. No distress.   HENT:   Head: Normocephalic and atraumatic.   Neck:   Mild decrease ROM.  -ve tenderness.   Musculoskeletal:   BUE:  ROM:   RUE: full.   LUE: full.  Strength:    RUE: 3+/5 at shoulder abduction, 4+ elbow flexion, 4+ elbow extension, 4+ hand .    LUE: 3+/5 at shoulder abduction, 4+ elbow flexion, 4+ elbow extension, 4+ hand .  Sensation to pinprick:   RUE: intact.   LUE: intact.      BLE:  ROM:     RLE: full.      LLE: full.   Knee crepitus.:     RLE: -ve.      LLE: -ve.   Strength:    RLE: 3/5 at hip flexion, 4 knee extension, 2 ankle DF, 4 PF.   LLE: 3+/5 at hip flexion, 4+ knee extension, 4 ankle DF, 4 PF.  Sensation to pinprick:     RLE: intact.      LLE: intact.   SLR (sitting):      RLE: -ve.      LLE: -ve.     -ve tenderness over lumbar spine.     Neurological: She is alert and oriented to person, place, and time.   Skin: Skin is warm.   Psychiatric: She has a normal mood and affect.   Vitals reviewed.      Assessment:       1. ALS (amyotrophic lateral sclerosis)    2. Impaired mobility and ADLs    3. Dysarthria    4. Dysphagia, unspecified type    5. Bilateral foot-drop    6. Muscle cramp        Plan:       - No significant pain.  - Muscle cramps are mild and infrequent, we will hold off on therapy with muscle relaxants (such as baclofen). She can call if symptoms worsen.  - Follow up in ALS clinic.

## 2019-07-24 NOTE — PROGRESS NOTES
Physical Therapy Screen at ALS clinic     Clinic Date: 07/24/2019  ALS clinic visit #: 3  Precautions: Fall    Time In: 0940    Time out: 1005  Billable time: 0 min    This patient is currently receiving home health services so a new PT evaluation was not completed today.  Her daughter was present.       Arianne is a 79 y.o. female that presents to [facility] clinic secondary to dx of ALS. Pt was first diagnosed 10/25/18. Symptom onset was March 2018 with initial symptoms of speech and swallowing followed by limbs.      Prior Therapy: Currently getting HHPT/OT/ST and NSG.  Social History:  recently passed. Daughter and son in law moved in with her.   Place of Residence (Steps/Adaptations/Levels): [address] [place]; 1 level home    Home adaptations: handrails added in bathroom, has a ramp over steps to get into the home  DME owned: Small based Quad cane, rollator, bedside comode, AFO, PWC being delivered next week, looking into PWC van.   Exercise routine: none     Pt stated goals/concerns: To learn about ALS and recommendations    Family present/states: daughter present  Pain: 0/10    Falls: None since last clinic    Current functional level: Vanessa/supervision with home distance walking with Rollator. SBA for community ambulation still limited distances. Vanessa for bed mobility, some assist for ADLs.     Lower Extremity Strength    Right LE   Left LE     Hip flexion: 3/5 Hip flexion: 4-/5   Hip extension: 3/5 Hip extension: 3+/5   Hip abduction: 3/5 Hip abduction: 3+/5   Hip adduction: 3+/5 Hip adduction 4-/5   Knee extension: 3+/5 Knee extension: 4/5   Knee flexion: 3/5 Knee flexion: 4-/5   Ankle PF 3/5 Ankle PF 3+/5   Ankle dorsiflexion: 2/5 Ankle dorsiflexion: 3+/5      Sitting balance static: good -  Sitting balance dynamic: fair  Standing balance static: fair w/ rollator  Standing balance dynamic: fair w/ rollator    Gait Assessment:   - AD used: Rollator  - Assistance: modified independence/  supervision  - Distance: around the home, limited distances to Zoroastrianism     Recommendations:   Pt to continue to follow up with referring provider and ALS clinic. No outpatient PT warranted. Continue home services.     Neck brace trial completed today due to head drop/posture, HeadUp collar - not a good fit due to severe thoracic kyphosis. Recommending child size headmaster or try soft collar. EDU handout provided with neck brace options. Pt denies neck pain and this is an optional recommendation if she feels the need.     Lisandra Coreas, PT  07/24/2019

## 2019-07-24 NOTE — PROGRESS NOTES
"Date: 7/24/2019     Start Time:  11:10  Stop Time:  11:22      ALS Clinic: 3     [facility]    SPEECH THERAPY NEUROLOGICAL REHABILITATION SCREENING    ALS MULTIDISCIPLINARY CLINIC    HISTORY AND SUBJECTIVE    Onset Date:  Symptoms- March 2018; Diagnosed- 10/25/18  Primary Diagnosis:  ALS  Treatment Diagnosis:    1. ALS (amyotrophic lateral sclerosis)     2. Impaired mobility and ADLs     3. Dysarthria     4. Dysphagia, unspecified type     5. Bilateral foot-drop        Referring Provider: Dr. Elaine/Dr. Woo  History of Present Illness: Arianne presents to the [facility] clinic secondary to the diagnosis of ALS.   Respiratory Status: appeared SOB when speaking  Pain:  0/10  Nutrition: small amounts of soft foods with thin liquids; Peg tube feedings 3x per day  Home Health Services: yes, PT and speech therapy    OBJECTIVE  Current Functional Level:   Language:  Appeared WNL for conversation.   Cognition:  Appeared WFL for conversation.   Motor Speech/Fluency/Voice: slow rate of speech with strained vocal quality and very low vocal intensity. Decreased breath-speech coordination observed.   Augmentative/Alternative Communication (AAC):  Pt is currently being trained to use an augmentative/ alternative communication device ([employer].   Swallowing: Pt reported increased effort when swallowing. She holds the food and swallows a little at a time. "I have to think about swallowing because it is hard to swallow." Peg tube is being used for primary means of nutrition.  Hearing: Appeared to be WFL for conversation.     ASSESSMENT  Arianne exhibited moderate mixed-spastic flaccid dysarthria and oropharyngeal dysphagia 2/2 ALS. She is currently receiving home health services.     PLAN  Recommended Treatment Plan: 1.Continue [facility] for SGD training and monitor swallowing. 2. Follow up with ALS clinic in 3 months.   3. Voice amplifier to help with reduced " vocal intensity.     NOTE: No charges were posted to this account.     JANET Cowan, CCC-SLP, CBIS  Speech-Language Pathologist  Outpatient Neurological Rehabilitation    Date: 7/24/2019

## 2019-07-24 NOTE — PATIENT INSTRUCTIONS
"ALS Clinic Notes  Neurology:  Labs today  Referral for Jim-Key valve for G tube    Respiratory:  We will contact [employer] to arrange for the "nasal pillow" mask to use during the day with the Trilogy machine.  Increase cough assist use from once per day to whenever it seems helpful (many times per day is fine).    PMR:      Physical Therapy: Neck brace trial completed today due to head drop/posture, HeadUp collar - not a good fit. Recommending child size headmaster or try soft collar. This is an optional recommendation if you feel the need or start to get neck pain. Adjusting recline back in power wheelchair will also help with posture / gaze forwards.     Occupational Therapy:  Waiting on approval for wheelchair from medicare    Speech Language Pathology: Continue home health speech therapy for training on use of communication device and to monitor swallowing difficulty. Recommend softer diet consistency and thin liquids as tolerated. Peg tube is primary means of nutrition.       Nutrition:      :     "

## 2019-07-30 NOTE — TELEPHONE ENCOUNTER
"Grace Button Conversion    Outgoing call to patient to discuss Jim-Escamilla Button conversion. Patient noted that she will call Dr. Burgos to schedule appt to discuss with him then follow up with ALS Clinic Team on her decision.     Chest Congestion     Also, patient states that she is experiencing a dry cough with runny nose and chest congestions. Mrs. Acuña started Mucinex on today and indicated that it seems to help "break up phlegm" in her chest. RN Coordinator inquired if patient is using cough assist and suction machine to assist. Patient responded yes and it seems to work.   "

## 2019-08-01 NOTE — PROGRESS NOTES
Refill Authorization Note     is requesting a refill authorization.    Brief assessment and rationale for refill: ROUTE: op   Name and strength of medication: benzonatate (TESSALON) 200 MG capsule       Medication Therapy Plan: outside of protocol, route to you     Medication reconciliation completed: No              How patient will take medication: t1c po bid prn cough          Comments:   APPOINTMENTS (past 12m or future 3m authorizing provider)  LAST VISIT DATE 3/28/2019 Chiki Paniagua MD        NEXT VISIT DATE 9/30/2019 Chiki Paniagua MD

## 2019-08-07 PROBLEM — M21.372 BILATERAL FOOT-DROP: Status: ACTIVE | Noted: 2019-01-01

## 2019-08-07 PROBLEM — M21.371 BILATERAL FOOT-DROP: Status: ACTIVE | Noted: 2019-01-01

## 2019-08-07 PROBLEM — R25.2 MUSCLE CRAMP: Status: ACTIVE | Noted: 2019-01-01

## 2019-08-07 NOTE — ASSESSMENT & PLAN NOTE
Soft collar for neck weakness  Home health PT continues, needs stretching    The patient was seen today for mobility evaluation for a power mobility device due to significant impairment at home.  - The patient has gait impairment and is unable to use a walker consistently more than 20-30 ft , due to BLE weakness and fatigue secondary to weakness from ALS. She also has impaired balance due to ALS.  - The patient is not able to ambulate safely to the kitchen or living room.  - The patient is unable to use an optimally-configured manual wheelchair in the home in order to perform Mobility Related Activities of Daily Living, due to BUE weakness & fatigue. She also has dyspnea due to respiratory muscle fatigue and to asthma.  - The patient's intact cognition should be able to use a power mobility device well at home.  - The patient was given a prescription for a power wheelchair.  - A scooter would not be appropriate due the patient's difficulty controlling the scooter tiller due to hand weakness & fatigue and to maneuverability restrictions at home.   - This will allow the patient to go safely to the kitchen, dining room or living room for feeding & socialization.  - The patient is to return the neurology clinic in 3 months.

## 2019-08-20 NOTE — TELEPHONE ENCOUNTER
Custom PWC Delivery    Incoming call from SETH Roberts with [employer] to inform that he is en route to deliver patient's custom power wheelchair on today. PWC arrived at facility on last week; however, the agency was waiting on the headrest and cushion.

## 2019-08-21 NOTE — PROGRESS NOTES
Refill Authorization Note     is requesting a refill authorization.    Brief assessment and rationale for refill: APPROVE: prr  Name and strength of medication: ATORVASTATIN 40MG TABLETS       Medication Therapy Plan: Labs WNL 3/19; approve 6 more    Medication reconciliation completed: No              How patient will take medication: t1t qd          Comments:   LAST LIPIDS (12 months)  Lab Results   Component Value Date    CHOL 140 03/08/2019    CHOL 151 07/19/2017    CHOL 266 (H) 01/18/2017    Lab Results   Component Value Date    HDL 52 03/08/2019    HDL 46 07/19/2017    HDL 46 01/18/2017      Lab Results   Component Value Date    TRIG 92 03/08/2019    TRIG 121 07/19/2017    TRIG 221 (H) 01/18/2017    Lab Results   Component Value Date    CHOLHDL 30.5 07/19/2017    CHOLHDL 17.3 (L) 01/18/2017    CHOLHDL 24.2 05/03/2013       Lab Results   Component Value Date    LDLCALC 70 03/08/2019    LDLCALC 80.8 07/19/2017    LDLCALC 175.8 (H) 01/18/2017        LAST LFTs (12 months)   Lab Results   Component Value Date    ALT 20 07/24/2019    ALT 19 03/08/2019    ALT 19 01/24/2019    Lab Results   Component Value Date    AST 21 07/24/2019    AST 21 03/08/2019    AST 21 01/24/2019      Lab Results   Component Value Date    BILITOT 0.3 07/24/2019    BILITOT 0.3 03/08/2019    BILITOT 0.5 01/24/2019    LFT/TOTBILI-wnl, or within 3x the UNL     APPOINTMENTS (past 12m or future 3m authorizing provider)  LAST VISIT DATE  Chiki Paniagua MD 3/28/2019         NEXT VISIT DATE  Chiki Paniagua MD 9/30/2019

## 2019-08-25 PROBLEM — J96.10 CHRONIC RESPIRATORY FAILURE: Status: ACTIVE | Noted: 2019-01-01

## 2019-09-16 NOTE — TELEPHONE ENCOUNTER
Late Entry    Hospice Admit:    Wed 8/28 at 12:56PM Message received from Geena GOODMAN: Mom is being discharged from Hospital today. She will have [facility] at home.     RN Coordinator notified Dr. Elaine at time message received. Dr. Elaine indicated that she received notification as well and sent words of encouragement to Geena (as well did RN Coordinator).

## 2019-10-11 ENCOUNTER — TELEPHONE (OUTPATIENT)
Dept: FAMILY MEDICINE | Facility: CLINIC | Age: 79
End: 2019-10-11

## 2019-10-11 NOTE — TELEPHONE ENCOUNTER
----- Message from Yu Jean sent at 10/11/2019  9:27 AM CDT -----  Type: Needs Medical Advice    Who Called:  Mely with [facility]   Symptoms (please be specific):  Passed away yesterday   How long has patient had these symptoms:     Pharmacy name and phone #:     Best Call Back Number: [phone]  Additional Information:

## 2019-10-11 NOTE — TELEPHONE ENCOUNTER
----- Message from Yu Jean sent at 10/11/2019  9:27 AM CDT -----  Type: Needs Medical Advice    Who Called:  Mely with [facility]   Symptoms (please be specific):  Passed away yesterday   How long has patient had these symptoms:     Pharmacy name and phone #:     Best Call Back Number: [phone]  Additional Information:

## 2024-04-19 NOTE — PROGRESS NOTES
OT Evaluation for ALS Clinic      Name: Arianne Johnson  MRN: [id number]     Physician: Savannah Elaine DO    Diagnosis: ALS, impaired clinic    Onset date: Diagnosed on 10/25/2018    Orders: Eval and Treat    Subjective:  Patient goals: Improve mobility  Chief Complaint:   Fall safety   Precautions: Standard and fall  Social Hx: Retired lives with her daughter and son-in-law    DME: Quad cane and grab bars in bathroom, rollator, 3 in 1 commode    Home access: 3 steps to enter a 1 story home. Bathroom is a tub shower and patient only takes baths.Pt. Fell in tub getting out. Pt. Given info on tub lifts.     Past treatment includes: no prior therapies    Pain: no pain reported today    Dominant hand: right    Occupation/hobbies/homemaking: TV, reading, shopping, games on tablet, [employer]  Job description includes: N/A  Driving status: Not currently driving    Objective  Cognitive Exam  Oriented: Person, Place, Time and Situation  Behaviors: Follows multistep  commands  Follows Commands/attention: Follows multistep  commands  Communication: clear/fluent  Memory: No Deficits noted  Safety awareness/insight to disability: Patient appears aware of diagnosis and insight into disability  Coping skills/emotional control: Appropriate to situation      Physical Exam:  Postural examination/scapula alignment: No abnormalities noted  Joint integrity: Firm end feeling  Skin integrity: Grossly intact  Edema: None noted  Palpation: N/A    Sensation: Arianne reports intact sensation  Light touch:  intact  Sharp/Dull:  intact  Kinesthesia: intact  Proprioception:  intact  Temperature:  intact    Joint evaluation:  WFL bilateral upper extremities    Strength:  RUE and LUE-  Shoulder flexion 4-  Shoulder adduction: 4-  Pronation: 4-  Wrist flexion/extension: 4+  All other areas bilaterally: 5       Fine motor coordination: 9 hole peg test  Date 11/28/2018 4/10/19   R hand(secs) 30 38   L hand (secs) 35 42     Tone:  Modified Casandra  Scale:   0 - No increase in muscle tone    Balance:   Static Sit: Fair, sits in slumped posture  Dynamic sit: Poor    Functional Status: Mod I    Functional Mobility:slow and effortful  Bed mobility: Mod I  Roll to left: Mod I  Roll to right:Mod I  Supine to sit: Mod I  Sit to supine: Mod I  Transfers to bed:Mod I  Transfers to toilet:Mod I  Car transfers: I  Wheelchair mobility: Pt. Needs power wheelchair to help conserve energy  Pt. Unable to do effective pressure relief due to poor UB strength and poor trunk strength.   ADL's:  Feeding: I  Grooming: I  Hygiene: I  UB Dressing: Mod I; difficulty with buttons  LB Dressing: Mod I; difficulty with buttons  Toileting: I  Bathing: Mod I; lowers self and exits bathtub using grab bars but recently fell. Info given about tub lift.     IADL's:  Homecare: Max A  Cooking: Max A  Laundry: Max A  Yard work: D  Use of telephone: Mod I  Money management: Mod I  Medication management: Mod I  TODAY'S TREATMENT:  Power wheelchair eval.Patient has mobility limitation that significantly impairs safe, timely, consistent participation in one or more MRADL. yes  A mobility assistive device will effectively improve ability to participate in or aid participation in MRADL's.  yes   A cane or walker will provide patient the ability to safely and consistently perform MRADLs in a timely manner no   The patient's home environment will support use of recommended mobility device. yes  The patient has sufficient UE strength to effectively self propel a manual wheelchair for al MRADL's. no  The patient has sufficient upper extremity strength, , transfer ability and trunk stability to safely operate a POV(scooter). no  The additional benefits of a power wheelchair will more effectively enable MRADL's in home. yes   The patient demonstrates ability/potential ability to use recommended equipment. yes  The patient is willing and motivated to use the recommended equipment. yes    ASSESSMENT:  OT  diagnosis: decreased ability to perform ADLs such as dressing and safe bathing and IADLs such as cooking and homecare. Pt. Is having falls.     Assessment  Arianne Johnson is a 78 y.o. female with a medical diagnosis of   ALS referred to occupational therapy for wheelchair eval and eval in ALS clinic. She presents with decreased endurance, decreased manual dexterity, decrease strength in bilateral UEs, decreased ability to complete ADLs/IADLs.    Arianne can benefit from home health Occupational therapy and a home program to address the stated goals to improve impairments and functional limitations. Treatment will be directed at improve the following impairments. Rehab potential is good due to motivation and supportive family .    PLAN:    Patient/caregiver understands and agrees with plan of care. OT HH to address energy conservation/work simplification strategies, assistive device use, and full body stretching program.  Profile and History Assessment of Occupational Performance Level of Clinical Decision Making Complexity Score   Occupational Profile:   Arianne Johnson is a 78 y.o. female who lives with their family and is currently retired.  Arianne Johnson has difficulty with  dressing  housework/household chores  affecting his/her daily functional abilities. His/her main goal for therapy is dressing and opening containers.     Comorbidities:   None known    Medical and Therapy History Review:   Brief               Performance Deficits    Physical:  Strength and endurance    Cognitive:  No Deficits    Psychosocial:    No Deficits     Clinical Decision Making:  low    Assessment Process:  Detailed Assessments    Modification/Need for Assistance:  Not Necessary    Intervention Selection:  Limited Treatment Options       low  Based on PMHX, co morbidities , data from assessments and functional level of assistance required with task and clinical presentation directly impacting function.        No